# Patient Record
Sex: FEMALE | Race: WHITE | NOT HISPANIC OR LATINO | Employment: FULL TIME
[De-identification: names, ages, dates, MRNs, and addresses within clinical notes are randomized per-mention and may not be internally consistent; named-entity substitution may affect disease eponyms.]

---

## 2017-07-08 ENCOUNTER — HEALTH MAINTENANCE LETTER (OUTPATIENT)
Age: 47
End: 2017-07-08

## 2018-07-15 ENCOUNTER — HEALTH MAINTENANCE LETTER (OUTPATIENT)
Age: 48
End: 2018-07-15

## 2019-11-02 ENCOUNTER — HEALTH MAINTENANCE LETTER (OUTPATIENT)
Age: 49
End: 2019-11-02

## 2020-03-13 ENCOUNTER — MEDICAL CORRESPONDENCE (OUTPATIENT)
Dept: HEALTH INFORMATION MANAGEMENT | Facility: CLINIC | Age: 50
End: 2020-03-13

## 2020-03-13 DIAGNOSIS — R50.9 HYPERTHERMIA-INDUCED DEFECT: Primary | ICD-10-CM

## 2020-03-13 PROCEDURE — 36415 COLL VENOUS BLD VENIPUNCTURE: CPT | Performed by: PHYSICIAN ASSISTANT

## 2020-03-13 PROCEDURE — 85025 COMPLETE CBC W/AUTO DIFF WBC: CPT | Performed by: PHYSICIAN ASSISTANT

## 2020-03-18 LAB
BASOPHILS # BLD AUTO: 0 10E9/L (ref 0–0.2)
BASOPHILS NFR BLD AUTO: 0.3 %
DIFFERENTIAL METHOD BLD: NORMAL
EOSINOPHIL # BLD AUTO: 0.3 10E9/L (ref 0–0.7)
EOSINOPHIL NFR BLD AUTO: 4.8 %
ERYTHROCYTE [DISTWIDTH] IN BLOOD BY AUTOMATED COUNT: 12.4 % (ref 10–15)
HCT VFR BLD AUTO: 39.2 % (ref 35–47)
HGB BLD-MCNC: 12.6 G/DL (ref 11.7–15.7)
LYMPHOCYTES # BLD AUTO: 1.1 10E9/L (ref 0.8–5.3)
LYMPHOCYTES NFR BLD AUTO: 18.8 %
MCH RBC QN AUTO: 28.4 PG (ref 26.5–33)
MCHC RBC AUTO-ENTMCNC: 32.1 G/DL (ref 31.5–36.5)
MCV RBC AUTO: 89 FL (ref 78–100)
MONOCYTES # BLD AUTO: 0.3 10E9/L (ref 0–1.3)
MONOCYTES NFR BLD AUTO: 4.8 %
NEUTROPHILS # BLD AUTO: 4.3 10E9/L (ref 1.6–8.3)
NEUTROPHILS NFR BLD AUTO: 71.3 %
PLATELET # BLD AUTO: 403 10E9/L (ref 150–450)
RBC # BLD AUTO: 4.43 10E12/L (ref 3.8–5.2)
WBC # BLD AUTO: 6.1 10E9/L (ref 4–11)

## 2020-04-08 ENCOUNTER — E-VISIT (OUTPATIENT)
Dept: FAMILY MEDICINE | Facility: CLINIC | Age: 50
End: 2020-04-08
Payer: COMMERCIAL

## 2020-04-08 DIAGNOSIS — Z53.9 ERRONEOUS ENCOUNTER--DISREGARD: Primary | ICD-10-CM

## 2020-04-08 ASSESSMENT — ANXIETY QUESTIONNAIRES
GAD7 TOTAL SCORE: 2
7. FEELING AFRAID AS IF SOMETHING AWFUL MIGHT HAPPEN: NOT AT ALL
7. FEELING AFRAID AS IF SOMETHING AWFUL MIGHT HAPPEN: NOT AT ALL
5. BEING SO RESTLESS THAT IT IS HARD TO SIT STILL: NOT AT ALL
GAD7 TOTAL SCORE: 2
GAD7 TOTAL SCORE: 2
3. WORRYING TOO MUCH ABOUT DIFFERENT THINGS: NOT AT ALL
6. BECOMING EASILY ANNOYED OR IRRITABLE: SEVERAL DAYS
1. FEELING NERVOUS, ANXIOUS, OR ON EDGE: SEVERAL DAYS
4. TROUBLE RELAXING: NOT AT ALL
2. NOT BEING ABLE TO STOP OR CONTROL WORRYING: NOT AT ALL

## 2020-04-08 ASSESSMENT — PATIENT HEALTH QUESTIONNAIRE - PHQ9
SUM OF ALL RESPONSES TO PHQ QUESTIONS 1-9: 4
SUM OF ALL RESPONSES TO PHQ QUESTIONS 1-9: 4
10. IF YOU CHECKED OFF ANY PROBLEMS, HOW DIFFICULT HAVE THESE PROBLEMS MADE IT FOR YOU TO DO YOUR WORK, TAKE CARE OF THINGS AT HOME, OR GET ALONG WITH OTHER PEOPLE: SOMEWHAT DIFFICULT

## 2020-04-09 ENCOUNTER — VIRTUAL VISIT (OUTPATIENT)
Dept: FAMILY MEDICINE | Facility: CLINIC | Age: 50
End: 2020-04-09
Payer: COMMERCIAL

## 2020-04-09 VITALS — HEIGHT: 70 IN | BODY MASS INDEX: 25.48 KG/M2 | WEIGHT: 178 LBS

## 2020-04-09 DIAGNOSIS — F41.1 ANXIETY STATE: ICD-10-CM

## 2020-04-09 DIAGNOSIS — F32.0 MILD MAJOR DEPRESSION (H): Primary | ICD-10-CM

## 2020-04-09 PROCEDURE — 96127 BRIEF EMOTIONAL/BEHAV ASSMT: CPT | Performed by: PHYSICIAN ASSISTANT

## 2020-04-09 PROCEDURE — 99214 OFFICE O/P EST MOD 30 MIN: CPT | Mod: 95 | Performed by: PHYSICIAN ASSISTANT

## 2020-04-09 RX ORDER — PAROXETINE 20 MG/1
20 TABLET, FILM COATED ORAL EVERY MORNING
Qty: 30 TABLET | Refills: 1 | Status: SHIPPED | OUTPATIENT
Start: 2020-04-09 | End: 2020-06-10

## 2020-04-09 RX ORDER — BUPROPION HYDROCHLORIDE 150 MG/1
150 TABLET ORAL EVERY MORNING
Qty: 30 TABLET | Refills: 1 | Status: SHIPPED | OUTPATIENT
Start: 2020-04-09 | End: 2020-06-10

## 2020-04-09 ASSESSMENT — ANXIETY QUESTIONNAIRES
2. NOT BEING ABLE TO STOP OR CONTROL WORRYING: NOT AT ALL
IF YOU CHECKED OFF ANY PROBLEMS ON THIS QUESTIONNAIRE, HOW DIFFICULT HAVE THESE PROBLEMS MADE IT FOR YOU TO DO YOUR WORK, TAKE CARE OF THINGS AT HOME, OR GET ALONG WITH OTHER PEOPLE: VERY DIFFICULT
5. BEING SO RESTLESS THAT IT IS HARD TO SIT STILL: SEVERAL DAYS
1. FEELING NERVOUS, ANXIOUS, OR ON EDGE: NOT AT ALL
7. FEELING AFRAID AS IF SOMETHING AWFUL MIGHT HAPPEN: NOT AT ALL
GAD7 TOTAL SCORE: 2
6. BECOMING EASILY ANNOYED OR IRRITABLE: NEARLY EVERY DAY
3. WORRYING TOO MUCH ABOUT DIFFERENT THINGS: NOT AT ALL
GAD7 TOTAL SCORE: 5

## 2020-04-09 ASSESSMENT — PATIENT HEALTH QUESTIONNAIRE - PHQ9
SUM OF ALL RESPONSES TO PHQ QUESTIONS 1-9: 4
5. POOR APPETITE OR OVEREATING: SEVERAL DAYS
SUM OF ALL RESPONSES TO PHQ QUESTIONS 1-9: 8

## 2020-04-09 ASSESSMENT — MIFFLIN-ST. JEOR: SCORE: 1509.71

## 2020-04-09 NOTE — PROGRESS NOTES
"Carole Stark is a 49 year old female who is being evaluated via a billable telephone visit.      The patient has been notified of following:     \"This telephone visit will be conducted via a call between you and your physician/provider. We have found that certain health care needs can be provided without the need for a physical exam.  This service lets us provide the care you need with a short phone conversation.  If a prescription is necessary we can send it directly to your pharmacy.  If lab work is needed we can place an order for that and you can then stop by our lab to have the test done at a later time.    Telephone visits are billed at different rates depending on your insurance coverage. During this emergency period, for some insurers they may be billed the same as an in-person visit.  Please reach out to your insurance provider with any questions.    If during the course of the call the physician/provider feels a telephone visit is not appropriate, you will not be charged for this service.\"    Patient has given verbal consent for Telephone visit?  Yes    How would you like to obtain your AVS? Junior    Hailey Stark complains of   Chief Complaint   Patient presents with     Depression     Anxiety       ALLERGIES  Meloxicam    Depression and Anxiety Follow-Up    How are you doing with your depression since your last visit? Worsened     How are you doing with your anxiety since your last visit?  Worsened     Are you having other symptoms that might be associated with depression or anxiety? Yes:  sleeping a lot, irritable,     Have you had a significant life event? Grief or Loss lost her father last year      Do you have any concerns with your use of alcohol or other drugs? No     History of taking Wellbutrin, Paxil and Prozac in the past with no issues but likes the combo of Paxil and Wellbutrin especially.      Social History     Tobacco Use     Smoking status: Never Smoker     Smokeless " tobacco: Never Used   Substance Use Topics     Alcohol use: Yes     Comment: 1-2 beers 1-2Xs/mo     Drug use: No     PHQ 3/24/2016 4/8/2020 4/9/2020   PHQ-9 Total Score 2 4 8   Q9: Thoughts of better off dead/self-harm past 2 weeks Not at all Not at all Not at all     TIBURCIO-7 SCORE 3/24/2016 4/8/2020 4/9/2020   Total Score - - -   Total Score - 2 (minimal anxiety) -   Total Score 0 2 5     Last PHQ-9 4/9/2020   1.  Little interest or pleasure in doing things 1   2.  Feeling down, depressed, or hopeless 0   3.  Trouble falling or staying asleep, or sleeping too much 3   4.  Feeling tired or having little energy 3   5.  Poor appetite or overeating 1   6.  Feeling bad about yourself 0   7.  Trouble concentrating 0   8.  Moving slowly or restless 0   Q9: Thoughts of better off dead/self-harm past 2 weeks 0   PHQ-9 Total Score 8   Difficulty at work, home, or with people Very difficult     TIBURCIO-7  4/9/2020   1. Feeling nervous, anxious, or on edge 0   2. Not being able to stop or control worrying 0   3. Worrying too much about different things 0   4. Trouble relaxing 1   5. Being so restless that it is hard to sit still 1   6. Becoming easily annoyed or irritable 3   7. Feeling afraid, as if something awful might happen 0   TIBURCIO-7 Total Score 5   If you checked any problems, how difficult have they made it for you to do your work, take care of things at home, or get along with other people? Very difficult     In the past two weeks have you had thoughts of suicide or self-harm?  No.    Do you have concerns about your personal safety or the safety of others?   No    Suicide Assessment Five-step Evaluation and Treatment (SAFE-T)          Patient Active Problem List   Diagnosis     Anxiety state     Mild major depression (H)     Myofascial pain     Congenital abnormality of pregnant uterus     CARDIOVASCULAR SCREENING; LDL GOAL LESS THAN 160     Cervicitis     Health Care Home     Kidney stone     Mucopurulent vaginitis     Need  for Tdap vaccination     Moderate dysplasia of cervix     Past Surgical History:   Procedure Laterality Date     Cervical Conization Loop Electrode Excision  1998    KENNY II  F/U Pap q 3 mo x 2 yrs were all NORMAL     KIDNEY SURGERY  summer 2012    7 kidney stone excision/stent placed     SURGICAL HISTORY OF -       lasik     SURGICAL HISTORY OF -       catheter ablation heart atrial fib tx       Social History     Tobacco Use     Smoking status: Never Smoker     Smokeless tobacco: Never Used   Substance Use Topics     Alcohol use: Yes     Comment: 1-2 beers 1-2Xs/mo     Family History   Problem Relation Age of Onset     Thyroid Disease Mother         removed     Cancer Paternal Grandmother         stomach cancer     Alzheimer Disease Paternal Grandmother      Depression Sister      Thyroid Disease Sister         on meds         Current Outpatient Medications   Medication Sig Dispense Refill     buPROPion (WELLBUTRIN XL) 150 MG 24 hr tablet Take 1 tablet (150 mg) by mouth every morning 30 tablet 1     PARoxetine (PAXIL) 20 MG tablet Take 1 tablet (20 mg) by mouth every morning 30 tablet 1     ibuprofen (ADVIL,MOTRIN) 800 MG tablet Take 1 tablet (800 mg) by mouth every 8 hours as needed for moderate pain 30 tablet 0     Allergies   Allergen Reactions     Meloxicam      Tongue swelling     Recent Labs   Lab Test 10/02/14  1633 12/19/13  1535 06/27/12  1507   ALT  --  36  --    CR 1.05* 0.94  --    GFRESTIMATED 57* 65  --    GFRESTBLACK 69 79  --    POTASSIUM 4.1 4.0  --    TSH  --  2.51 1.55      BP Readings from Last 3 Encounters:   03/24/16 119/84   10/02/14 116/72   09/26/14 98/68    Wt Readings from Last 3 Encounters:   04/09/20 80.7 kg (178 lb)   03/24/16 76.3 kg (168 lb 5 oz)   10/02/14 75.8 kg (167 lb)                    Reviewed and updated as needed this visit by Provider  Tobacco  Allergies  Meds  Problems  Med Hx  Surg Hx  Fam Hx         Review of Systems   ROS COMP: Constitutional, HEENT,  "cardiovascular, pulmonary, GI, , musculoskeletal, neuro, skin, endocrine and psych systems are negative, except as otherwise noted.       Objective   Reported vitals:  Ht 1.765 m (5' 9.5\")   Wt 80.7 kg (178 lb)   BMI 25.91 kg/m     healthy, alert and no distress  Psych: Alert and oriented times 3; coherent speech, normal   rate and volume, able to articulate logical thoughts, able   to abstract reason, no tangential thoughts, no hallucinations   or delusions  Her affect is bright.     Diagnostic Test Results:  Labs reviewed in Epic        Assessment/Plan:  1. Mild major depression (H)  Long standing, chronic, UNcontrolled-  Restart medicines, prescription sent to pharmacy; not ready for counseling options at this time.  - PARoxetine (PAXIL) 20 MG tablet; Take 1 tablet (20 mg) by mouth every morning  Dispense: 30 tablet; Refill: 1  - buPROPion (WELLBUTRIN XL) 150 MG 24 hr tablet; Take 1 tablet (150 mg) by mouth every morning  Dispense: 30 tablet; Refill: 1    2. Anxiety state  Long standing, chronic, UNcontrolled-  Restart medicines, prescription sent to pharmacy; not ready for counseling options at this time.  - PARoxetine (PAXIL) 20 MG tablet; Take 1 tablet (20 mg) by mouth every morning  Dispense: 30 tablet; Refill: 1  - buPROPion (WELLBUTRIN XL) 150 MG 24 hr tablet; Take 1 tablet (150 mg) by mouth every morning  Dispense: 30 tablet; Refill: 1    Return in about 4 weeks (around 5/7/2020) for Routine visit, or sooner with worsening symptoms.      Phone call duration:  11 minutes    Vandana Ross PA-C    "

## 2020-04-10 ASSESSMENT — ANXIETY QUESTIONNAIRES: GAD7 TOTAL SCORE: 5

## 2020-07-07 DIAGNOSIS — F32.0 MILD MAJOR DEPRESSION (H): ICD-10-CM

## 2020-07-07 DIAGNOSIS — F41.1 ANXIETY STATE: ICD-10-CM

## 2020-07-09 RX ORDER — PAROXETINE 20 MG/1
20 TABLET, FILM COATED ORAL EVERY MORNING
Qty: 30 TABLET | Refills: 0 | Status: SHIPPED | OUTPATIENT
Start: 2020-07-09 | End: 2020-07-10

## 2020-07-09 RX ORDER — BUPROPION HYDROCHLORIDE 150 MG/1
150 TABLET ORAL EVERY MORNING
Qty: 30 TABLET | Refills: 0 | Status: SHIPPED | OUTPATIENT
Start: 2020-07-09 | End: 2020-07-10

## 2020-07-10 ENCOUNTER — VIRTUAL VISIT (OUTPATIENT)
Dept: FAMILY MEDICINE | Facility: CLINIC | Age: 50
End: 2020-07-10
Payer: COMMERCIAL

## 2020-07-10 DIAGNOSIS — R53.83 FATIGUE, UNSPECIFIED TYPE: ICD-10-CM

## 2020-07-10 DIAGNOSIS — R63.5 WEIGHT GAIN: ICD-10-CM

## 2020-07-10 DIAGNOSIS — R40.0 DAYTIME SLEEPINESS: Primary | ICD-10-CM

## 2020-07-10 DIAGNOSIS — F32.0 MILD MAJOR DEPRESSION (H): ICD-10-CM

## 2020-07-10 DIAGNOSIS — F41.1 ANXIETY STATE: ICD-10-CM

## 2020-07-10 PROCEDURE — 99214 OFFICE O/P EST MOD 30 MIN: CPT | Mod: 95 | Performed by: PHYSICIAN ASSISTANT

## 2020-07-10 RX ORDER — BUPROPION HYDROCHLORIDE 150 MG/1
150 TABLET ORAL EVERY MORNING
Qty: 90 TABLET | Refills: 3 | Status: SHIPPED | OUTPATIENT
Start: 2020-07-10 | End: 2020-08-20

## 2020-07-10 RX ORDER — PAROXETINE 20 MG/1
20 TABLET, FILM COATED ORAL EVERY MORNING
Qty: 90 TABLET | Refills: 3 | Status: SHIPPED | OUTPATIENT
Start: 2020-07-10 | End: 2020-08-20

## 2020-07-10 NOTE — PROGRESS NOTES
"Hailey Stark is a 50 year old female who is being evaluated via a billable video visit.      The patient has been notified of following:     \"This video visit will be conducted via a call between you and your physician/provider. We have found that certain health care needs can be provided without the need for an in-person physical exam.  This service lets us provide the care you need with a video conversation.  If a prescription is necessary we can send it directly to your pharmacy.  If lab work is needed we can place an order for that and you can then stop by our lab to have the test done at a later time.    Video visits are billed at different rates depending on your insurance coverage.  Please reach out to your insurance provider with any questions.    If during the course of the call the physician/provider feels a video visit is not appropriate, you will not be charged for this service.\"    Patient has given verbal consent for Video visit? Yes  How would you like to obtain your AVS? Mail a copy  Patient would like the video invitation sent by: Text to cell phone: [phone]  Will anyone else be joining your video visit? No    Subjective     Hailey Stark is a 50 year old female who presents today via video visit for the following health issues:    HPI  Medication Followup of  PARoxetine (PAXIL) 20 MG tablet     Taking Medication as prescribed: yes    Side Effects:  None    Medication Helping Symptoms:  yes   Feels better since starting wellbutrin and paxil   Not villar and irritable   Still feeling tired    Works 6 AM to 2:30 PM, will nap after work until 6-7 PM then to goes to sleep again around 10:30.   Not feeling depressed, just tired all the time.   Wears a fitbit to bed, shows she is up multiple times but she is not aware of sleep disturbance   Has heard she snores at night from friends   Never had a sleep apnea workup     No personal history of thyroid dysfunction   Mom has h/o thyroidectomy   Sister has " h/o hashimoto thyroiditis   Hair is thinning  And sleeping a lot   Weight gain   Dry skin    Diet is OK   Vegetarian since age 16 no meat no fish or eggs or poultry   Wondering about diet pills      Video Start Time: 2:09 PM            Reviewed and updated as needed this visit by Provider         Review of Systems   Constitutional, HEENT, cardiovascular, pulmonary, gi and gu systems are negative, except as otherwise noted.      Objective             Physical Exam     GENERAL: Healthy, alert and no distress  EYES: Eyes grossly normal to inspection.  No discharge or erythema, or obvious scleral/conjunctival abnormalities.  RESP: No audible wheeze, cough, or visible cyanosis.  No visible retractions or increased work of breathing.    SKIN: Visible skin clear. No significant rash, abnormal pigmentation or lesions.  NEURO: Cranial nerves grossly intact.  Mentation and speech appropriate for age.  PSYCH: Mentation appears normal, affect normal/bright, judgement and insight intact, normal speech and appearance well-groomed.      Diagnostic Test Results:  Labs reviewed in Epic        Assessment & Plan     (R40.0) Daytime sleepiness  (primary encounter diagnosis)  Comment: Getting plenty of sleep and naps, still with excessive daytime tiredness. Has been told she snores. Will send for sleep evaluation. Future labs to rule out common causes of fatigue also placed.   Plan: SLEEP EVALUATION & MANAGEMENT REFERRAL - [facility]         [phone]  (Age 18 and up)            (F32.0) Mild major depression (H)  Comment: Paxil and Wellbutrin helping. Refills provided.   Plan: PARoxetine (PAXIL) 20 MG tablet, buPROPion         (WELLBUTRIN XL) 150 MG 24 hr tablet           (F41.1) Anxiety state  Comment: As above.   Plan: PARoxetine (PAXIL) 20 MG tablet, buPROPion         (WELLBUTRIN XL) 150 MG 24 hr tablet            (R53.83) Fatigue, unspecified type  Comment: Rule out anemia, thyroid  dysfunction, vitamin deficiencies, electrolyte abnormalities   Plan: CBC with platelets, Iron and iron binding         capacity, Ferritin, TSH with free T4 reflex,         Vitamin B12, Vitamin D Deficiency,         Comprehensive metabolic panel           (R63.5) Weight gain  Comment: Gaining weight, interested in medication management of weight   Plan: COMPREHENSIVE WEIGHT MANAGEMENT          There are no Patient Instructions on file for this visit.    Return for Lab Work, sleep evaluation, weight management evaluation .    Valeriy Ramirez PA-C  [facility]      Video-Visit Details    Type of service:  Video Visit    Video End Time: 2:40 PM     Originating Location (pt. Location): Home    Distant Location (provider location):  Home office     Platform used for Video Visit: [facility]    Return for Lab Work, sleep evaluation, weight management evaluation .       Valeriy Ramirez PA-C

## 2020-07-10 NOTE — TELEPHONE ENCOUNTER
Medication is being filled for 1 time refill only due to:  Patient needs to be seen because Depression recheck/preventative visit.

## 2020-08-05 ENCOUNTER — TRANSFERRED RECORDS (OUTPATIENT)
Dept: HEALTH INFORMATION MANAGEMENT | Facility: CLINIC | Age: 50
End: 2020-08-05

## 2020-08-07 DIAGNOSIS — R53.83 FATIGUE, UNSPECIFIED TYPE: ICD-10-CM

## 2020-08-07 LAB
ALBUMIN SERPL-MCNC: 3.5 G/DL (ref 3.4–5)
ALP SERPL-CCNC: 86 U/L (ref 40–150)
ALT SERPL W P-5'-P-CCNC: 21 U/L (ref 0–50)
ANION GAP SERPL CALCULATED.3IONS-SCNC: 6 MMOL/L (ref 3–14)
AST SERPL W P-5'-P-CCNC: 22 U/L (ref 0–45)
BILIRUB SERPL-MCNC: 0.4 MG/DL (ref 0.2–1.3)
BUN SERPL-MCNC: 9 MG/DL (ref 7–30)
CALCIUM SERPL-MCNC: 8.9 MG/DL (ref 8.5–10.1)
CHLORIDE SERPL-SCNC: 106 MMOL/L (ref 94–109)
CO2 SERPL-SCNC: 25 MMOL/L (ref 20–32)
CREAT SERPL-MCNC: 0.95 MG/DL (ref 0.52–1.04)
ERYTHROCYTE [DISTWIDTH] IN BLOOD BY AUTOMATED COUNT: 12.8 % (ref 10–15)
FERRITIN SERPL-MCNC: 15 NG/ML (ref 8–252)
GFR SERPL CREATININE-BSD FRML MDRD: 70 ML/MIN/{1.73_M2}
GLUCOSE SERPL-MCNC: 103 MG/DL (ref 70–99)
HCT VFR BLD AUTO: 41.1 % (ref 35–47)
HGB BLD-MCNC: 13.4 G/DL (ref 11.7–15.7)
IRON SATN MFR SERPL: 12 % (ref 15–46)
IRON SERPL-MCNC: 44 UG/DL (ref 35–180)
MCH RBC QN AUTO: 28.8 PG (ref 26.5–33)
MCHC RBC AUTO-ENTMCNC: 32.6 G/DL (ref 31.5–36.5)
MCV RBC AUTO: 88 FL (ref 78–100)
PLATELET # BLD AUTO: 300 10E9/L (ref 150–450)
POTASSIUM SERPL-SCNC: 4.3 MMOL/L (ref 3.4–5.3)
PROT SERPL-MCNC: 7.5 G/DL (ref 6.8–8.8)
RBC # BLD AUTO: 4.65 10E12/L (ref 3.8–5.2)
SODIUM SERPL-SCNC: 137 MMOL/L (ref 133–144)
TIBC SERPL-MCNC: 361 UG/DL (ref 240–430)
TSH SERPL DL<=0.005 MIU/L-ACNC: 3.24 MU/L (ref 0.4–4)
VIT B12 SERPL-MCNC: 343 PG/ML (ref 193–986)
WBC # BLD AUTO: 5 10E9/L (ref 4–11)

## 2020-08-07 PROCEDURE — 82607 VITAMIN B-12: CPT | Performed by: PHYSICIAN ASSISTANT

## 2020-08-07 PROCEDURE — 85027 COMPLETE CBC AUTOMATED: CPT | Performed by: PHYSICIAN ASSISTANT

## 2020-08-07 PROCEDURE — 83540 ASSAY OF IRON: CPT | Performed by: PHYSICIAN ASSISTANT

## 2020-08-07 PROCEDURE — 82306 VITAMIN D 25 HYDROXY: CPT | Performed by: PHYSICIAN ASSISTANT

## 2020-08-07 PROCEDURE — 80053 COMPREHEN METABOLIC PANEL: CPT | Performed by: PHYSICIAN ASSISTANT

## 2020-08-07 PROCEDURE — 83550 IRON BINDING TEST: CPT | Performed by: PHYSICIAN ASSISTANT

## 2020-08-07 PROCEDURE — 84443 ASSAY THYROID STIM HORMONE: CPT | Performed by: PHYSICIAN ASSISTANT

## 2020-08-07 PROCEDURE — 82728 ASSAY OF FERRITIN: CPT | Performed by: PHYSICIAN ASSISTANT

## 2020-08-07 PROCEDURE — 36415 COLL VENOUS BLD VENIPUNCTURE: CPT | Performed by: PHYSICIAN ASSISTANT

## 2020-08-07 NOTE — RESULT ENCOUNTER NOTE
Macie Ms. Stark,  Your results came back and are within acceptable limits. -Normal red blood cell (hgb) levels, normal white blood cell count and normal platelet levels.. If you have any further concerns please do not hesitate to contact us by message, phone or making an appointment.  Have a good day   Dr Jos ARCE in [facility] absence

## 2020-08-07 NOTE — RESULT ENCOUNTER NOTE
Macie AlbarranEnma Stark,  Your results came back and are within acceptable limits. -Ferritin (iron) level is normal.. If you have any further concerns please do not hesitate to contact us by message, phone or making an appointment.  Have a good day   Dr Jos ARCE

## 2020-08-07 NOTE — RESULT ENCOUNTER NOTE
Macie Ms. Stark,  Your results came back and are within acceptable limits. -Liver and gallbladder tests are normal (ALT,AST, Alk phos, bilirubin), kidney function is normal (Cr, GFR), sodium is normal, potassium is normal, calcium is normal, glucose is normal.  -TSH (thyroid stimulating hormone) level is normal which indicates normal thyroid function.  - Normal vit B 12 level  . If you have any further concerns please do not hesitate to contact us by message, phone or making an appointment.  Have a good day   Dr Jos ARCE

## 2020-08-10 LAB — DEPRECATED CALCIDIOL+CALCIFEROL SERPL-MC: 16 UG/L (ref 20–75)

## 2020-08-10 NOTE — RESULT ENCOUNTER NOTE
Macie Ms. Stark,  Your results came back showing  -Vitamin D level is low and oral supplementation should be started.  ADVISE: starting over the counter Vitamin D3  2000 IU - 3 tabs (6000 IU) daily for 6 weeks and then 2000 IU daily to maintain levels.  Then in 2 months, please schedule a lab only appointment to recheck your Vitamin D levels.. If you have any further concerns please do not hesitate to contact us by message, phone or making an appointment.  Have a good day   Dr Jos ARCE in your pcp absence today

## 2020-08-14 DIAGNOSIS — F32.0 MILD MAJOR DEPRESSION (H): ICD-10-CM

## 2020-08-14 DIAGNOSIS — F41.1 ANXIETY STATE: ICD-10-CM

## 2020-08-20 RX ORDER — PAROXETINE 20 MG/1
TABLET, FILM COATED ORAL
Qty: 30 TABLET | Refills: 0 | Status: SHIPPED | OUTPATIENT
Start: 2020-08-20 | End: 2021-09-23

## 2020-08-20 RX ORDER — BUPROPION HYDROCHLORIDE 150 MG/1
TABLET ORAL
Qty: 30 TABLET | Refills: 0 | Status: SHIPPED | OUTPATIENT
Start: 2020-08-20 | End: 2021-09-24

## 2020-09-04 ENCOUNTER — OFFICE VISIT (OUTPATIENT)
Dept: FAMILY MEDICINE | Facility: CLINIC | Age: 50
End: 2020-09-04
Payer: COMMERCIAL

## 2020-09-04 VITALS
BODY MASS INDEX: 25.7 KG/M2 | RESPIRATION RATE: 14 BRPM | HEART RATE: 71 BPM | HEIGHT: 70 IN | OXYGEN SATURATION: 100 % | WEIGHT: 179.5 LBS | SYSTOLIC BLOOD PRESSURE: 144 MMHG | DIASTOLIC BLOOD PRESSURE: 86 MMHG

## 2020-09-04 DIAGNOSIS — L21.9 SEBORRHEIC DERMATITIS: ICD-10-CM

## 2020-09-04 DIAGNOSIS — R21 RASH AND NONSPECIFIC SKIN ERUPTION: Primary | ICD-10-CM

## 2020-09-04 PROCEDURE — 99214 OFFICE O/P EST MOD 30 MIN: CPT | Performed by: FAMILY MEDICINE

## 2020-09-04 RX ORDER — KETOCONAZOLE 20 MG/ML
SHAMPOO TOPICAL DAILY PRN
Qty: 120 ML | Refills: 1 | Status: SHIPPED | OUTPATIENT
Start: 2020-09-04 | End: 2021-12-10

## 2020-09-04 RX ORDER — BETAMETHASONE VALERATE 0.1 %
LOTION (ML) TOPICAL 2 TIMES DAILY
Qty: 60 ML | Refills: 0 | Status: SHIPPED | OUTPATIENT
Start: 2020-09-04 | End: 2021-04-29

## 2020-09-04 ASSESSMENT — MIFFLIN-ST. JEOR: SCORE: 1519.46

## 2020-09-04 ASSESSMENT — PAIN SCALES - GENERAL: PAINLEVEL: NO PAIN (0)

## 2020-09-04 NOTE — PROGRESS NOTES
Subjective     Hailey Stark is a 50 year old female who presents to clinic today for the following health issues:    HPI       Patient in for scalp concerns, Has some patches of dry skin not sure if it is psoriasis. States has been there for 2 months and getting worse. No pain no redness   Slightly itchy but not a lot and mostly pastor - one at the base on the back of scalp and one more in the parietal are , no crusting no drainage , no redness , no pimples etc        Review of Systems   Constitutional, HEENT, cardiovascular, pulmonary, GI, , musculoskeletal, neuro, skin, endocrine and psych systems are negative, except as otherwise noted.      Objective    There were no vitals taken for this visit.  There is no height or weight on file to calculate BMI.  Physical Exam   GENERAL: healthy, alert and no distress  SCALP: overall dry skin of scalp and two areas ( one in the parietal surface and one on the pccipital area ) are very dry with some dandruff   EYES: Eyes grossly normal to inspection, PERRL and conjunctivae and sclerae normal  NECK: no adenopathy, no asymmetry, masses, or scars and thyroid normal to palpation  MS: no gross musculoskeletal defects noted, no edema    No results found for any visits on 09/04/20.        Assessment & Plan     Rash and nonspecific skin eruption  We discussed as below the possibility of having seborrheic dermatitis of the scalp , yordan ltry with the shampoo and if not better use the lotion twice a day on the two spots , up to one week , if not better would need to see derm and I have given her the referral .  - DERMATOLOGY ADULT REFERRAL; Future  - ketoconazole (NIZORAL) 2 % external shampoo; Apply topically daily as needed for itching or irritation (use for washing the hair twice a week)  - betamethasone valerate (VALISONE) 0.1 % external lotion; Apply topically 2 times daily Apply on affected areas twice a day for up to two weeks    Seborrheic dermatitis  As above , discussed the  "seborrheic dermatitis and origins, course of treatment etc   RTC if no improving or worsening.  Pt is aware  and comfortable with the current plan.         BMI:   Estimated body mass index is 25.76 kg/m  as calculated from the following:    Height as of this encounter: 1.778 m (5' 10\").    Weight as of this encounter: 81.4 kg (179 lb 8 oz).           F/u in two to three weeks .      Celina Noel MD  [facility]      "

## 2020-11-16 ENCOUNTER — HEALTH MAINTENANCE LETTER (OUTPATIENT)
Age: 50
End: 2020-11-16

## 2020-11-19 ENCOUNTER — ANCILLARY PROCEDURE (OUTPATIENT)
Dept: GENERAL RADIOLOGY | Facility: CLINIC | Age: 50
End: 2020-11-19
Attending: PREVENTIVE MEDICINE
Payer: OTHER MISCELLANEOUS

## 2020-11-19 ENCOUNTER — NURSE TRIAGE (OUTPATIENT)
Dept: NURSING | Facility: CLINIC | Age: 50
End: 2020-11-19

## 2020-11-19 ENCOUNTER — OFFICE VISIT (OUTPATIENT)
Dept: URGENT CARE | Facility: URGENT CARE | Age: 50
End: 2020-11-19
Payer: OTHER MISCELLANEOUS

## 2020-11-19 ENCOUNTER — VIRTUAL VISIT (OUTPATIENT)
Dept: URGENT CARE | Facility: CLINIC | Age: 50
End: 2020-11-19
Payer: OTHER MISCELLANEOUS

## 2020-11-19 VITALS
SYSTOLIC BLOOD PRESSURE: 124 MMHG | WEIGHT: 178 LBS | DIASTOLIC BLOOD PRESSURE: 84 MMHG | BODY MASS INDEX: 25.48 KG/M2 | OXYGEN SATURATION: 97 % | HEART RATE: 94 BPM | TEMPERATURE: 99.4 F | HEIGHT: 70 IN

## 2020-11-19 DIAGNOSIS — R07.9 CHEST PAIN, UNSPECIFIED TYPE: ICD-10-CM

## 2020-11-19 DIAGNOSIS — R06.02 SOB (SHORTNESS OF BREATH): Primary | ICD-10-CM

## 2020-11-19 DIAGNOSIS — U07.1 CLINICAL DIAGNOSIS OF COVID-19: ICD-10-CM

## 2020-11-19 DIAGNOSIS — R06.02 SOB (SHORTNESS OF BREATH): ICD-10-CM

## 2020-11-19 DIAGNOSIS — M79.10 MYALGIA: ICD-10-CM

## 2020-11-19 LAB
ALBUMIN SERPL-MCNC: 3.6 G/DL (ref 3.4–5)
ALP SERPL-CCNC: 85 U/L (ref 40–150)
ALT SERPL W P-5'-P-CCNC: 18 U/L (ref 0–50)
ANION GAP SERPL CALCULATED.3IONS-SCNC: 4 MMOL/L (ref 3–14)
AST SERPL W P-5'-P-CCNC: 16 U/L (ref 0–45)
BASOPHILS # BLD AUTO: 0 10E9/L (ref 0–0.2)
BASOPHILS NFR BLD AUTO: 0.7 %
BILIRUB SERPL-MCNC: 0.4 MG/DL (ref 0.2–1.3)
BUN SERPL-MCNC: 10 MG/DL (ref 7–30)
CALCIUM SERPL-MCNC: 9.2 MG/DL (ref 8.5–10.1)
CHLORIDE SERPL-SCNC: 105 MMOL/L (ref 94–109)
CK SERPL-CCNC: 60 U/L (ref 30–225)
CO2 SERPL-SCNC: 28 MMOL/L (ref 20–32)
CREAT SERPL-MCNC: 1.1 MG/DL (ref 0.52–1.04)
CRP SERPL-MCNC: <2.9 MG/L (ref 0–8)
D DIMER PPP FEU-MCNC: 0.3 UG/ML FEU (ref 0–0.5)
DIFFERENTIAL METHOD BLD: NORMAL
EOSINOPHIL # BLD AUTO: 0.1 10E9/L (ref 0–0.7)
EOSINOPHIL NFR BLD AUTO: 1 %
ERYTHROCYTE [DISTWIDTH] IN BLOOD BY AUTOMATED COUNT: 12.4 % (ref 10–15)
GFR SERPL CREATININE-BSD FRML MDRD: 59 ML/MIN/{1.73_M2}
GLUCOSE SERPL-MCNC: 135 MG/DL (ref 70–99)
HCT VFR BLD AUTO: 41.1 % (ref 35–47)
HGB BLD-MCNC: 13.3 G/DL (ref 11.7–15.7)
LYMPHOCYTES # BLD AUTO: 1.7 10E9/L (ref 0.8–5.3)
LYMPHOCYTES NFR BLD AUTO: 28.3 %
MCH RBC QN AUTO: 28.5 PG (ref 26.5–33)
MCHC RBC AUTO-ENTMCNC: 32.4 G/DL (ref 31.5–36.5)
MCV RBC AUTO: 88 FL (ref 78–100)
MONOCYTES # BLD AUTO: 0.3 10E9/L (ref 0–1.3)
MONOCYTES NFR BLD AUTO: 5.5 %
NEUTROPHILS # BLD AUTO: 4 10E9/L (ref 1.6–8.3)
NEUTROPHILS NFR BLD AUTO: 64.5 %
NT-PROBNP SERPL-MCNC: 97 PG/ML (ref 0–125)
PLATELET # BLD AUTO: 327 10E9/L (ref 150–450)
POTASSIUM SERPL-SCNC: 3.9 MMOL/L (ref 3.4–5.3)
PROT SERPL-MCNC: 7.4 G/DL (ref 6.8–8.8)
RBC # BLD AUTO: 4.67 10E12/L (ref 3.8–5.2)
SODIUM SERPL-SCNC: 137 MMOL/L (ref 133–144)
TROPONIN I SERPL-MCNC: <0.015 UG/L (ref 0–0.04)
WBC # BLD AUTO: 6.1 10E9/L (ref 4–11)

## 2020-11-19 PROCEDURE — 84484 ASSAY OF TROPONIN QUANT: CPT | Performed by: PREVENTIVE MEDICINE

## 2020-11-19 PROCEDURE — 83880 ASSAY OF NATRIURETIC PEPTIDE: CPT | Performed by: PREVENTIVE MEDICINE

## 2020-11-19 PROCEDURE — 85379 FIBRIN DEGRADATION QUANT: CPT | Performed by: PREVENTIVE MEDICINE

## 2020-11-19 PROCEDURE — 82550 ASSAY OF CK (CPK): CPT | Performed by: PREVENTIVE MEDICINE

## 2020-11-19 PROCEDURE — 85025 COMPLETE CBC W/AUTO DIFF WBC: CPT | Performed by: PREVENTIVE MEDICINE

## 2020-11-19 PROCEDURE — 71046 X-RAY EXAM CHEST 2 VIEWS: CPT | Performed by: RADIOLOGY

## 2020-11-19 PROCEDURE — 80053 COMPREHEN METABOLIC PANEL: CPT | Performed by: PREVENTIVE MEDICINE

## 2020-11-19 PROCEDURE — 99207 PR NO CHARGE LOS: CPT | Performed by: NURSE PRACTITIONER

## 2020-11-19 PROCEDURE — 99215 OFFICE O/P EST HI 40 MIN: CPT | Performed by: PREVENTIVE MEDICINE

## 2020-11-19 PROCEDURE — 86140 C-REACTIVE PROTEIN: CPT | Performed by: PREVENTIVE MEDICINE

## 2020-11-19 PROCEDURE — 93000 ELECTROCARDIOGRAM COMPLETE: CPT | Performed by: PREVENTIVE MEDICINE

## 2020-11-19 PROCEDURE — 36415 COLL VENOUS BLD VENIPUNCTURE: CPT | Performed by: PREVENTIVE MEDICINE

## 2020-11-19 ASSESSMENT — MIFFLIN-ST. JEOR: SCORE: 1512.65

## 2020-11-19 NOTE — TELEPHONE ENCOUNTER
"Last Covid test was Nov. 2nd. It was   POSITIVE.  Had no symptoms at the time.    Now having ; exhaustion, she is winded, chest pressure, sob,  No fever.Body aches, and chills are positive.  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  Patient advised to make MD appointment to discuss if she should be returning. To work. She states she feels guilty staying home if she has no   Upper Respiratory symptoms.  Yudy Horn RN on 11/19/2020 at 11:09 AM      COVID 19 Nurse Triage Plan/Patient Instructions    Please be aware that novel coronavirus (COVID-19) may be circulating in the community. If you develop symptoms such as fever, cough, or SOB or if you have concerns about the presence of another infection including coronavirus (COVID-19), please contact your health care provider or visit [email or web address].     Disposition/Instructions    Home care recommended. Follow home care protocol based instructions.  Virtual Visit with provider recommended. Reference Visit Selection Guide.  Additional COVID19 information to add for patients.   How can I protect others?  If you have symptoms (fever, cough, body aches or trouble breathing): Stay home and away from others (self-isolate) until:    At least 10 days have passed since your symptoms started, And     You ve had no fever--and no medicine that reduces fever--for 1 full day (24 hours), And      Your other symptoms have resolved (gotten better).     If you don t have symptoms, but a test showed that you have COVID-19 (you tested positive):    Stay home and away from others (self-isolate). Follow the tips under \"How do I self-isolate?\" below for 10 days (20 days if you have a weak immune system).    You don't need to be retested for COVID-19 before going back to school or work. As long as you're fever-free and feeling better, you can go back to school, work and other activities after waiting the 10 or 20 days.     How do I " self-isolate?    Stay in your own room, even for meals. Use your own bathroom if you can.     Stay away from others in your home. No hugging, kissing or shaking hands. No visitors.    Don t go to work, school or anywhere else.     Clean  high touch  surfaces often (doorknobs, counters, handles, etc.). Use a household cleaning spray or wipes. You ll find a full list on the EPA website:  www.epa.gov/pesticide-registration/list-n-disinfectants-use-against-sars-cov-2.    Cover your mouth and nose with a mask, tissue or washcloth to avoid spreading germs.    Wash your hands and face often. Use soap and water.    Caregivers in these groups are at risk for severe illness due to COVID-19:  o People 65 years and older  o People who live in a nursing home or long-term care facility  o People with chronic disease (lung, heart, cancer, diabetes, kidney, liver, immunologic)  o People who have a weakened immune system, including those who:  - Are in cancer treatment  - Take medicine that weakens the immune system, such as corticosteroids  - Had a bone marrow or organ transplant  - Have an immune deficiency  - Have poorly controlled HIV or AIDS  - Are obese (body mass index of 40 or higher)  - Smoke regularly    Caregivers should wear gloves while washing dishes, handling laundry and cleaning bedrooms and bathrooms.    Use caution when washing and drying laundry: Don t shake dirty laundry, and use the warmest water setting that you can.    For more tips, go to www.cdc.gov/coronavirus/2019-ncov/downloads/10Things.pdf.    How can I take care of myself?  1. Get lots of rest. Drink extra fluids (unless a doctor has told you not to).     2. Take Tylenol (acetaminophen) for fever or pain. If you have liver or kidney problems, ask your family doctor if it s okay to take Tylenol.     Adults can take either:     650 mg (two 325 mg pills) every 4 to 6 hours, or     1,000 mg (two 500 mg pills) every 8 hours as needed.     Note: Don t take  more than 3,000 mg in one day.   Acetaminophen is found in many medicines (both prescribed and over-the-counter medicines). Read all labels to be sure you don t take too much.     For children, check the Tylenol bottle for the right dose. The dose is based on the child s age or weight.    3. If you have other health problems (like cancer, heart failure, an organ transplant or severe kidney disease): Call your specialty clinic if you don t feel better in the next 2 days.    4. Know when to call 911: Emergency warning signs include:    Trouble breathing or shortness of breath    Pain or pressure in the chest that doesn t go away    Feeling confused like you haven t felt before, or not being able to wake up    Bluish-colored lips or face    What are the symptoms of COVID-19?     The most common symptoms are cough, fever and trouble breathing.     Less common symptoms include body aches, chills, diarrhea (loose, watery poops), fatigue (feeling very tired), headache, runny nose, sore throat and loss of smell.    COVID-19 can cause severe coughing (bronchitis) and lung infection (pneumonia).    How does it spread?     The virus may spread when a person coughs or sneezes into the air. The virus can travel about 6 feet this way, and it can live on surfaces.      Common  (household disinfectants) will kill the virus.    Who is at risk?  Anyone can catch COVID-19 if they re around someone who has the virus.    How can others protect themselves?     Stay away from people who have COVID-19 (or symptoms of COVID-19).    Wash hands often with soap and water. Or, use hand  with at least 60% alcohol.    Avoid touching the eyes, nose or mouth.     Wear a face mask when you go out in public, when sick or when caring for a sick person.    Where can I get more information?     [employer] [facility]: About COVID-19: [email or web address]    CDC: What to Do If You re Sick:  www.cdc.gov/coronavirus/2019-ncov/about/steps-when-sick.html    CDC: Ending Home Isolation: www.cdc.gov/coronavirus/2019-ncov/hcp/disposition-in-home-patients.html     CDC: Caring for Someone: www.cdc.gov/coronavirus/2019-ncov/if-you-are-sick/care-for-someone.html     [facility]: Interim Guidance for Hospital Discharge to Home: www.[facility]/diseases/coronavirus/hcp/hospdischarge.pdf    [facility] clinical trials (COVID-19 research studies): clinicalaffairs.[facility]-clinical-trials     Below are the COVID-19 hotlines at the [place] Department of [facility] ([facility]). Interpreters are available.   o For health questions: Call 009-990-7873 or 1-872.717.3130 (7 a.m. to 7 p.m.)  o For questions about schools and childcare: Call 849-745-3297 or 1-186.178.9877 (7 a.m. to 7 p.m.)          Thank you for taking steps to prevent the spread of this virus.  o Limit your contact with others.  o Wear a simple mask to cover your cough.  o Wash your hands well and often.    [facility]: About COVID-19: [email or web address]    CDC: What to Do If You're Sick: www.cdc.gov/coronavirus/2019-ncov/about/steps-when-sick.html    CDC: Ending Home Isolation: www.cdc.gov/coronavirus/2019-ncov/hcp/disposition-in-home-patients.html     CDC: Caring for Someone: www.cdc.gov/coronavirus/2019-ncov/if-you-are-sick/care-for-someone.html     [facility]: Interim Guidance for Hospital Discharge to Home: www.[facility]/diseases/coronavirus/hcp/hospdischarge.pdf    [facility] clinical trials (COVID-19 research studies): clinicalaffairs.[facility]-clinical-trials     Below are the COVID-19 hotlines at the [place] Department [facility] ([facility]). Interpreters are available.   o For health questions: Call 226-539-5804 or 1-642.672.8191 (7 a.m. to 7 p.m.)  o For questions about schools and childcare: Call 646-272-7180 or 1-483.573.3074 (7 a.m. to 7 p.m.)

## 2020-11-19 NOTE — PROGRESS NOTES
No smoking or vaping, recreational drug use    Wellbutrin, Paxil    SUBJECTIVE:  Hailey Stark is a 50 year old female who presents to the office with the CC of chest pain.  Patient complains of chest pressure/discomfort, dyspnea and fatigue.  The pain is characterized as mild pressure located in the central chest without radiation. Symptoms began 2 day(s) ago, gradual onset  Pain is associated with SOB.  Pain is exacerbated by nothing.  Pain is relieved by rest.  Cardiac risk factors: negative for abnormal lipids, DM, HTN, tobacco and negative FH    She was exposed to COVID at work. First became symptomatic on October 2 with headache and  Wheezing.  Diagnosed on 10/6 with COVID  Back to work on October 12.  Worked until October 16 when she developed chest pressure and sob.  Exposed at work - [employer] - back to work on 12-16  No personal or family hx of vte or premature cad          Past Medical History:   Diagnosis Date     Anxiety state, unspecified     Anxiety     Cardiac dysrhythmia, unspecified     Arrhythmia NOS s/p ablation     Chronic peptic ulcer, unspecified site, without mention of hemorrhage, perforation, or obstruction     Ulcer, Peptic     Depressive disorder, not elsewhere classified     Depression (non-psychotic)     Leukorrhea, not specified as infective 11/18/2009    heavy, daily, yellow/green mucoid dischg following retained tampon removal.Tx w flagyl, metrogel, Cleocin, Diflucan, doxycycline, boric acid capsules. 5/16/2011 + GBS.      Menarche age 13    cycles q 30 x 5 d, heavy     Moderate dysplasia of cervix (KENNY II) 1998     Normal Papssince LEEP->routine screening         Current Outpatient Medications:      buPROPion (WELLBUTRIN XL) 150 MG 24 hr tablet, TAKE 1 TABLET(150 MG) BY MOUTH EVERY MORNING, Disp: 30 tablet, Rfl: 0     PARoxetine (PAXIL) 20 MG tablet, TAKE 1 TABLET(20 MG) BY MOUTH EVERY MORNING, Disp: 30 tablet, Rfl: 0     betamethasone valerate (VALISONE) 0.1 %  "external lotion, Apply topically 2 times daily Apply on affected areas twice a day for up to two weeks (Patient not taking: Reported on 11/19/2020), Disp: 60 mL, Rfl: 0     ibuprofen (ADVIL,MOTRIN) 800 MG tablet, Take 1 tablet (800 mg) by mouth every 8 hours as needed for moderate pain (Patient not taking: Reported on 7/10/2020), Disp: 30 tablet, Rfl: 0     ketoconazole (NIZORAL) 2 % external shampoo, Apply topically daily as needed for itching or irritation (use for washing the hair twice a week) (Patient not taking: Reported on 11/19/2020), Disp: 120 mL, Rfl: 1    Social History     Tobacco Use     Smoking status: Never Smoker     Smokeless tobacco: Never Used   Substance Use Topics     Alcohol use: Not Currently     Comment: 1-2 beers 1-2Xs/mo     Family History   Problem Relation Age of Onset     Thyroid Disease Mother         removed     Cancer Paternal Grandmother         stomach cancer     Alzheimer Disease Paternal Grandmother      Depression Sister      Thyroid Disease Sister      Thyroid Disease Sister         on meds     Diabetes Brother      Depression Brother      Diabetes Nephew        ROS:Review of systems negative except as stated above.    EXAM:  /84   Pulse 94   Temp 99.4  F (37.4  C) (Tympanic)   Ht 1.778 m (5' 10\")   Wt 80.7 kg (178 lb)   SpO2 97%   BMI 25.54 kg/m    GENERAL APPEARANCE: healthy, alert and no distress  EYES: EOMI,  PERRL, conjunctiva clear  HENT: ear canals and TM's normal.  Nose and mouth without ulcers, erythema or lesions  NECK: supple, nontender, no lymphadenopathy  RESP: lungs clear to auscultation - no rales, rhonchi or wheezes  CV: regular rates and rhythm, normal S1 S2, no murmur noted, no ttp over chest, no rash  ABDOMEN:  soft, nontender, no HSM or masses and bowel sounds normal  NEURO: Normal strength and tone, sensory exam grossly normal,  normal speech and mentation  SKIN: no suspicious lesions or rashes     EKG - nsr, no st or t wave changes, no q waves, " nl intervals, no comparisons available  CXR - no infiltrate, no edema, no effusion, normal cardiac silhouette  Labs - cbc,ddimer, troponin, cmp, bnp all wnl        Assessment  / IMPRESSION  (R06.02) SOB (shortness of breath)  (primary encounter diagnosis)  Plan: EKG 12-lead complete w/read - Clinics, XR Chest        2 Views, CBC with platelets and differential,         Troponin I, BNP-N terminal pro, Comprehensive         metabolic panel, D dimer, quantitative  Advise oximeter at home, follow up if o2 < 90 or sob worsens    (R07.9) Chest pain, unspecified type  Plan: CBC with platelets and differential, Troponin         I, BNP-N terminal pro, Comprehensive metabolic         panel, D dimer, quantitative  Follow up if chest pain worsens.     (M79.10) Myalgia  Plan: CK total, CRP, inflammation    I believe all symptoms are consistent with covid  Don't return to work until symptoms improve and you are fever free for 24 hours.  Tylenol for achiness.  Rest.     Follow up in one week by virtual visit if not improving or sooner if worsening.    PLAN:See orders in epic    40 minutes spent face to face with patient, over 50% time counseling, coordinating care and explaining about nature of the patient's conditions.

## 2020-11-19 NOTE — PROGRESS NOTES
"SUBJECTIVE:   Hailey Stark is a 50 year old female presenting with a chief complaint of fatigue.   Tested positive for covid 11/6. Went back to work 11/12. Was feeling better  Starting to feel ill again 6 days ago.   Fatigue, weakness, aches, sob with exertion, feels slightly \"winded\"   Drinking and eating normally no vomiting diarrhea. No fever    Past Medical History:   Diagnosis Date     Anxiety state, unspecified     Anxiety     Cardiac dysrhythmia, unspecified     Arrhythmia NOS s/p ablation     Chronic peptic ulcer, unspecified site, without mention of hemorrhage, perforation, or obstruction     Ulcer, Peptic     Depressive disorder, not elsewhere classified     Depression (non-psychotic)     Leukorrhea, not specified as infective 11/18/2009    heavy, daily, yellow/green mucoid dischg following retained tampon removal.Tx w flagyl, metrogel, Cleocin, Diflucan, doxycycline, boric acid capsules. 5/16/2011 + GBS.      Menarche age 13    cycles q 30 x 5 d, heavy     Moderate dysplasia of cervix (KENNY II) 1998     Normal Papssince LEEP->routine screening     Current Outpatient Medications   Medication Sig Dispense Refill     betamethasone valerate (VALISONE) 0.1 % external lotion Apply topically 2 times daily Apply on affected areas twice a day for up to two weeks 60 mL 0     buPROPion (WELLBUTRIN XL) 150 MG 24 hr tablet TAKE 1 TABLET(150 MG) BY MOUTH EVERY MORNING 30 tablet 0     ibuprofen (ADVIL,MOTRIN) 800 MG tablet Take 1 tablet (800 mg) by mouth every 8 hours as needed for moderate pain (Patient not taking: Reported on 7/10/2020) 30 tablet 0     ketoconazole (NIZORAL) 2 % external shampoo Apply topically daily as needed for itching or irritation (use for washing the hair twice a week) 120 mL 1     PARoxetine (PAXIL) 20 MG tablet TAKE 1 TABLET(20 MG) BY MOUTH EVERY MORNING 30 tablet 0     Social History     Tobacco Use     Smoking status: Never Smoker     Smokeless tobacco: Never Used   Substance Use Topics     " Alcohol use: Not Currently     Comment: 1-2 beers 1-2Xs/mo       ROS:  CONSTITUTIONAL:POSITIVE  for fatigue  INTEGUMENTARY/SKIN: NEGATIVE for worrisome rashes, moles or lesions  EYES: NEGATIVE for vision changes or irritation  ENT/MOUTH: NEGATIVE for ear, mouth and throat problems  RESP:POSITIVE for SOB    OBJECTIVE:  GENERAL APPEARANCE: alert and no distress  RESP: lungs clear, no wheezes  CV: regular rates and rhythm  SKIN: no suspicious lesions or rashes    ASSESSMENT:  (R06.02) SOB (shortness of breath)  (primary encounter diagnosis)    (U07.1) Clinical diagnosis of COVID-19      PLAN:  In our telephone visit she does not sob, she is speaking in full sentences. She does not sound urgently ill on phone.   She is afebrile and hydrated. I am concerned about possible secondary complication to covid such as pneumonia with feeling better now worse and new symptoms?  Would like her seen in urgent care today for vitals, lung assessment xray etc  Did discuss if her oxygen were low or were deemed she was too ill on exam may need to be seen in ER. She will drive herself to urgent care now    Telephone time spent 15 minutes    MELODIE Foster CNP

## 2020-11-19 NOTE — PATIENT INSTRUCTIONS
(R06.02) SOB (shortness of breath)  (primary encounter diagnosis)  Plan: EKG 12-lead complete w/read - Clinics, XR Chest        2 Views, CBC with platelets and differential,         Troponin I, BNP-N terminal pro, Comprehensive         metabolic panel, D dimer, quantitative  Advise oximeter at home, follow up if o2 < 90 or sob worsens    (R07.9) Chest pain, unspecified type  Plan: CBC with platelets and differential, Troponin         I, BNP-N terminal pro, Comprehensive metabolic         panel, D dimer, quantitative  Follow up if chest pain worsens.     (M79.10) Myalgia  Plan: CK total, CRP, inflammation    I believe all symptoms are consistent with covid  Don't return to work until symptoms improve and you are fever free for 24 hours.  Tylenol for achiness.  Rest    Follow up in one week by virtual visit if not improving or sooner if worsening.

## 2020-11-20 ENCOUNTER — MYC MEDICAL ADVICE (OUTPATIENT)
Dept: FAMILY MEDICINE | Facility: CLINIC | Age: 50
End: 2020-11-20

## 2021-01-26 ENCOUNTER — MYC MEDICAL ADVICE (OUTPATIENT)
Dept: FAMILY MEDICINE | Facility: CLINIC | Age: 51
End: 2021-01-26

## 2021-01-26 DIAGNOSIS — U07.1 INFECTION DUE TO 2019 NOVEL CORONAVIRUS: Primary | ICD-10-CM

## 2021-01-26 DIAGNOSIS — R06.02 SHORTNESS OF BREATH: ICD-10-CM

## 2021-01-26 DIAGNOSIS — R53.83 FATIGUE, UNSPECIFIED TYPE: ICD-10-CM

## 2021-01-28 NOTE — TELEPHONE ENCOUNTER
I put in a referral to have her be seen by the post-COVID adult care clinic. They are doing a lot with patients having ongoing symptoms after COVID infection. They should be reaching out to her to schedule an appointment.

## 2021-01-28 NOTE — TELEPHONE ENCOUNTER
Writer responded via [employer].  JOSUE TranN, RN  [facility]

## 2021-01-28 NOTE — TELEPHONE ENCOUNTER
Valeriy,    Please see her complaints. Do you want to use an inperson appt tomorrow for a virtual visit for her or do you want to see her in person in one of those slots?    Radha Eastman, RN, BSN

## 2021-01-29 ENCOUNTER — MYC MEDICAL ADVICE (OUTPATIENT)
Dept: FAMILY MEDICINE | Facility: CLINIC | Age: 51
End: 2021-01-29

## 2021-02-06 ENCOUNTER — HEALTH MAINTENANCE LETTER (OUTPATIENT)
Age: 51
End: 2021-02-06

## 2021-02-10 ENCOUNTER — DOCUMENTATION ONLY (OUTPATIENT)
Dept: CARE COORDINATION | Facility: CLINIC | Age: 51
End: 2021-02-10

## 2021-03-10 ENCOUNTER — VIRTUAL VISIT (OUTPATIENT)
Dept: PHYSICAL MEDICINE AND REHAB | Facility: CLINIC | Age: 51
End: 2021-03-10
Attending: PHYSICIAN ASSISTANT
Payer: OTHER MISCELLANEOUS

## 2021-03-10 DIAGNOSIS — R00.2 PALPITATIONS: ICD-10-CM

## 2021-03-10 DIAGNOSIS — R41.89 COGNITIVE CHANGES: ICD-10-CM

## 2021-03-10 DIAGNOSIS — G93.31 POSTVIRAL FATIGUE SYNDROME: ICD-10-CM

## 2021-03-10 DIAGNOSIS — R06.02 SHORTNESS OF BREATH: ICD-10-CM

## 2021-03-10 DIAGNOSIS — U07.1 INFECTION DUE TO 2019 NOVEL CORONAVIRUS: ICD-10-CM

## 2021-03-10 DIAGNOSIS — R53.83 FATIGUE, UNSPECIFIED TYPE: ICD-10-CM

## 2021-03-10 DIAGNOSIS — R06.09 DYSPNEA ON EXERTION: Primary | ICD-10-CM

## 2021-03-10 PROCEDURE — 99215 OFFICE O/P EST HI 40 MIN: CPT | Mod: 95 | Performed by: INTERNAL MEDICINE

## 2021-03-10 ASSESSMENT — ANXIETY QUESTIONNAIRES
7. FEELING AFRAID AS IF SOMETHING AWFUL MIGHT HAPPEN: NOT AT ALL
2. NOT BEING ABLE TO STOP OR CONTROL WORRYING: SEVERAL DAYS
1. FEELING NERVOUS, ANXIOUS, OR ON EDGE: SEVERAL DAYS
5. BEING SO RESTLESS THAT IT IS HARD TO SIT STILL: NOT AT ALL
IF YOU CHECKED OFF ANY PROBLEMS ON THIS QUESTIONNAIRE, HOW DIFFICULT HAVE THESE PROBLEMS MADE IT FOR YOU TO DO YOUR WORK, TAKE CARE OF THINGS AT HOME, OR GET ALONG WITH OTHER PEOPLE: NOT DIFFICULT AT ALL
6. BECOMING EASILY ANNOYED OR IRRITABLE: NOT AT ALL
GAD7 TOTAL SCORE: 4
3. WORRYING TOO MUCH ABOUT DIFFERENT THINGS: SEVERAL DAYS

## 2021-03-10 ASSESSMENT — PATIENT HEALTH QUESTIONNAIRE - PHQ9
5. POOR APPETITE OR OVEREATING: SEVERAL DAYS
SUM OF ALL RESPONSES TO PHQ QUESTIONS 1-9: 9

## 2021-03-10 NOTE — LETTER
3/10/2021       RE: Hailey Stark  [address]  [place] [facility] [address]     Dear Colleague,    Thank you for referring your patient, Hailey Stark, to the [facility] PHYSICAL MEDICINE AND REHABILITATION CLINIC [place] [facility]. Please see a copy of my visit note below.    Hailey is a 50 year old who is being evaluated via a billable video visit.      How would you like to obtain your AVS? MyChart  If the video visit is dropped, the invitation should be resent by: Text to cell phone: [phone]  Will anyone else be joining your video visit? No      Video Start Time: 4:02 PM    Assessment & Plan     Infection due to 2019 novel coronavirus  Patient was advised on sequelae of COVID-19 infection.    - ADULT POST COVID CLINIC REFERRAL    Fatigue, unspecified type  Discussed possible causes of fatigue following a viral illness.  Recommend further evaluation.  Referral to sleep management as well as physical therapy program were provided to the patient today.  - ADULT POST COVID CLINIC REFERRAL  - TSH with free T4 reflex; Future  - Comprehensive metabolic panel; Future  - CBC with Platelets Differential; Future  - C-Reactive Protein, Inflammatory; Future  - RBC Sedimentation Rate; Future  - Hemoglobin A1c; Future  - SLEEP EVALUATION & MANAGEMENT REFERRAL - [facility] [place] [phone] (Age 15 and up); Future  - PHYSICAL THERAPY REFERRAL; Future    Shortness of breath  Reviewed shortness of breath following COVID-19 infection with patient.  Recommend further evaluation with pulmonary function tests as well as a consultation with the pulmonary specialist.  - ADULT POST COVID CLINIC REFERRAL    Dyspnea on exertion    - General PFT Lab (Please always keep checked); Future  - PULMONARY MEDICINE REFERRAL  - 6 minute walk test; Future  - Pulmonary Function Test; Future    Palpitations  Patient was advised on possible cardiac involvement with  "COVID-19.  Recommend EKG monitoring as well as echocardiogram.  - Leadless EKG Monitor 3 to 7 Days; Future  - Echocardiogram Complete; Future    Postviral fatigue syndrome  Referral to Occupational Therapy COVID-19 fatigue reduction program was placed today.  - OCCUPATIONAL THERAPY REFERRAL; Future    Cognitive changes  Discussed possible causes of cognitive changes, including anxiety, depression, and COVID-19 sequelae.  Recommend neuropsychology testing.  Referral to speech therapy for cognitive rehab was also placed today.  - NEUROPSYCHOLOGY REFERRAL  - SPEECH THERAPY REFERRAL; Future      45 minutes spent on the date of the encounter doing chart review, history and exam, documentation and further activities as noted above       BMI:   Estimated body mass index is 25.54 kg/m  as calculated from the following:    Height as of 11/19/20: 1.778 m (5' 10\").    Weight as of 11/19/20: 80.7 kg (178 lb).     Return in about 1 month (around 4/10/2021) for Routine Visit, BP Recheck.    Fatoumata Rahman MD  [facility] PHYSICAL MEDICINE AND REHABILITATION CLINIC [place]      Carole Hart is a 50 year old who presents for the following health issues     HPI     Patient has scheduled a visit to discuss some of the symptoms related to COVID-19 infection. She reports that she had COVID in November. However, she is still feeling short of breath with physical activity. 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 She tested positive on November 6th. She states that she went back to work after a week and was feeling very tired. She came back to work again after completing her quarantine. She was seen in urgent care again on November 19. Patient reports that she has history of exercise induces asthma.   Patient reports that she also has been feeling very tired since she had COVID. She is working full shifts. She comes home feeling very tired and may fall asleep until the next morning. She " states she does not wake up feeling rested. She has been told that she snores.    Review of Systems   Constitutional, HEENT, cardiovascular, pulmonary, GI, , musculoskeletal, neuro, skin, endocrine and psych systems are negative, except as otherwise noted.      Objective      Vitals:  No vitals were obtained today due to virtual visit.    Physical Exam   GENERAL: Healthy, alert and no distress  EYES: Eyes grossly normal to inspection.  No discharge or erythema, or obvious scleral/conjunctival abnormalities.  RESP: No audible wheeze, cough, or visible cyanosis.  No visible retractions or increased work of breathing.    SKIN: Visible skin clear. No significant rash, abnormal pigmentation or lesions.  NEURO: Cranial nerves grossly intact.  Mentation and speech appropriate for age.  PSYCH: Mentation appears normal, affect normal/bright, judgement and insight intact, normal speech and appearance well-groomed.    Video-Visit Details    Type of service:  Video Visit    Video End Time:4: 40 PM    Originating Location (pt. Location): Home    Distant Location (provider location):  [facility] PHYSICAL MEDICINE AND REHABILITATION CLINIC [place]     Platform used for Video Visit: AmWell    Again, thank you for allowing me to participate in the care of your patient.      Sincerely,    Fatoumata Rahman MD

## 2021-03-10 NOTE — PROGRESS NOTES
Hailey is a 50 year old who is being evaluated via a billable video visit.      How would you like to obtain your AVS? MyChart  If the video visit is dropped, the invitation should be resent by: Text to cell phone: [phone]  Will anyone else be joining your video visit? No      Video Start Time: 4:02 PM    Assessment & Plan     Infection due to 2019 novel coronavirus  Patient was advised on sequelae of COVID-19 infection.    - ADULT POST COVID CLINIC REFERRAL    Fatigue, unspecified type  Discussed possible causes of fatigue following a viral illness.  Recommend further evaluation.  Referral to sleep management as well as physical therapy program were provided to the patient today.  - ADULT POST COVID CLINIC REFERRAL  - TSH with free T4 reflex; Future  - Comprehensive metabolic panel; Future  - CBC with Platelets Differential; Future  - C-Reactive Protein, Inflammatory; Future  - RBC Sedimentation Rate; Future  - Hemoglobin A1c; Future  - SLEEP EVALUATION & MANAGEMENT REFERRAL - ADULT [facility] [phone] (Age 15 and up); Future  - PHYSICAL THERAPY REFERRAL; Future    Shortness of breath  Reviewed shortness of breath following COVID-19 infection with patient.  Recommend further evaluation with pulmonary function tests as well as a consultation with the pulmonary specialist.  - ADULT POST COVID CLINIC REFERRAL    Dyspnea on exertion    - General PFT Lab (Please always keep checked); Future  - PULMONARY MEDICINE REFERRAL  - 6 minute walk test; Future  - Pulmonary Function Test; Future    Palpitations  Patient was advised on possible cardiac involvement with COVID-19.  Recommend EKG monitoring as well as echocardiogram.  - Leadless EKG Monitor 3 to 7 Days; Future  - Echocardiogram Complete; Future    Postviral fatigue syndrome  Referral to Occupational Therapy COVID-19 fatigue reduction program was placed today.  - OCCUPATIONAL THERAPY REFERRAL; Future    Cognitive changes  Discussed  "possible causes of cognitive changes, including anxiety, depression, and COVID-19 sequelae.  Recommend neuropsychology testing.  Referral to speech therapy for cognitive rehab was also placed today.  - NEUROPSYCHOLOGY REFERRAL  - SPEECH THERAPY REFERRAL; Future      45 minutes spent on the date of the encounter doing chart review, history and exam, documentation and further activities as noted above       BMI:   Estimated body mass index is 25.54 kg/m  as calculated from the following:    Height as of 11/19/20: 1.778 m (5' 10\").    Weight as of 11/19/20: 80.7 kg (178 lb).           Return in about 1 month (around 4/10/2021) for Routine Visit, BP Recheck.    Fatoumata Rahman MD  [facility] PHYSICAL MEDICINE AND REHABILITATION CLINIC [place] is a 50 year old who presents for the following health issues     HPI       Patient has scheduled a visit to discuss some of the symptoms related to COVID-19 infection. She reports that she had COVID in November. However, she is still feeling short of breath with physical activity. 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 She tested positive on November 6th. She states that she went back to work after a week and was feeling very tired. She came back to work again after completing her quarantine. She was seen in urgent care again on November 19. Patient reports that she has history of exercise induces asthma.   Patient reports that she also has been feeling very tired since she had COVID. She is working full shifts. She comes home feeling very tired and may fall asleep until the next morning. She states she does not wake up feeling rested. She has been told that she snores.    Review of Systems   Constitutional, HEENT, cardiovascular, pulmonary, GI, , musculoskeletal, neuro, skin, endocrine and psych systems are negative, except as otherwise noted.      Objective           Vitals:  No vitals were obtained today due to " virtual visit.    Physical Exam   GENERAL: Healthy, alert and no distress  EYES: Eyes grossly normal to inspection.  No discharge or erythema, or obvious scleral/conjunctival abnormalities.  RESP: No audible wheeze, cough, or visible cyanosis.  No visible retractions or increased work of breathing.    SKIN: Visible skin clear. No significant rash, abnormal pigmentation or lesions.  NEURO: Cranial nerves grossly intact.  Mentation and speech appropriate for age.  PSYCH: Mentation appears normal, affect normal/bright, judgement and insight intact, normal speech and appearance well-groomed.                Video-Visit Details    Type of service:  Video Visit    Video End Time:4: 40 PM    Originating Location (pt. Location): Home    Distant Location (provider location):  [facility] PHYSICAL MEDICINE AND REHABILITATION CLINIC [place]     Platform used for Video Visit: [employer]

## 2021-03-11 ASSESSMENT — ANXIETY QUESTIONNAIRES: GAD7 TOTAL SCORE: 4

## 2021-03-15 ENCOUNTER — TELEPHONE (OUTPATIENT)
Dept: PULMONOLOGY | Facility: CLINIC | Age: 51
End: 2021-03-15

## 2021-03-15 DIAGNOSIS — R06.00 DYSPNEA: ICD-10-CM

## 2021-03-15 DIAGNOSIS — U07.1 2019 NOVEL CORONAVIRUS DISEASE (COVID-19): Primary | ICD-10-CM

## 2021-03-15 NOTE — TELEPHONE ENCOUNTER
Patient returned my voicemails and was able to schedule her referral that was placed by Dr. Rahman. Discussed limited availability the remainder of March and most of April, thus would be looking at the end of April with Dr. Keller, She is agreeable to this. No testing in the last three months thus this was scheduled as well. Details confirmed with patient.

## 2021-03-16 NOTE — TELEPHONE ENCOUNTER
RECORDS RECEIVED FROM: internal    DATE RECEIVED: 4.29.21    NOTES STATUS DETAILS   OFFICE NOTE from referring provider Fatoumata Bhakta,    OFFICE NOTE from other specialist na    DISCHARGE SUMMARY from hospital na    DISCHARGE REPORT from the ER internal  [facility] 11.19.20 [facility]    MEDICATION LIST internal     IMAGING  (NEED IMAGES AND REPORTS)     CT SCAN na    CHEST XRAY (CXR) internal  Scheduled 4.29.21    TESTS     PULMONARY FUNCTION TESTING (PFT) internal  Scheduled 4.29.21

## 2021-03-19 DIAGNOSIS — U07.1 2019 NOVEL CORONAVIRUS DISEASE (COVID-19): ICD-10-CM

## 2021-03-19 DIAGNOSIS — R53.83 FATIGUE, UNSPECIFIED TYPE: ICD-10-CM

## 2021-03-19 DIAGNOSIS — R06.00 DYSPNEA: ICD-10-CM

## 2021-03-19 LAB
ALBUMIN SERPL-MCNC: 3.6 G/DL (ref 3.4–5)
ALP SERPL-CCNC: 72 U/L (ref 40–150)
ALT SERPL W P-5'-P-CCNC: 17 U/L (ref 0–50)
ANION GAP SERPL CALCULATED.3IONS-SCNC: 4 MMOL/L (ref 3–14)
AST SERPL W P-5'-P-CCNC: 16 U/L (ref 0–45)
BASOPHILS # BLD AUTO: 0.1 10E9/L (ref 0–0.2)
BASOPHILS NFR BLD AUTO: 1 %
BILIRUB SERPL-MCNC: 0.6 MG/DL (ref 0.2–1.3)
BUN SERPL-MCNC: 10 MG/DL (ref 7–30)
CALCIUM SERPL-MCNC: 8.2 MG/DL (ref 8.5–10.1)
CHLORIDE SERPL-SCNC: 109 MMOL/L (ref 94–109)
CO2 SERPL-SCNC: 25 MMOL/L (ref 20–32)
CREAT SERPL-MCNC: 1.03 MG/DL (ref 0.52–1.04)
CRP SERPL-MCNC: 3.5 MG/L (ref 0–8)
DIFFERENTIAL METHOD BLD: NORMAL
EOSINOPHIL # BLD AUTO: 0.1 10E9/L (ref 0–0.7)
EOSINOPHIL NFR BLD AUTO: 1.9 %
ERYTHROCYTE [DISTWIDTH] IN BLOOD BY AUTOMATED COUNT: 13 % (ref 10–15)
ERYTHROCYTE [SEDIMENTATION RATE] IN BLOOD BY WESTERGREN METHOD: 11 MM/H (ref 0–20)
GFR SERPL CREATININE-BSD FRML MDRD: 63 ML/MIN/{1.73_M2}
GLUCOSE SERPL-MCNC: 100 MG/DL (ref 70–99)
HBA1C MFR BLD: 5.7 % (ref 0–5.6)
HCT VFR BLD AUTO: 39.5 % (ref 35–47)
HGB BLD-MCNC: 13.1 G/DL (ref 11.7–15.7)
LYMPHOCYTES # BLD AUTO: 1.3 10E9/L (ref 0.8–5.3)
LYMPHOCYTES NFR BLD AUTO: 25.6 %
MCH RBC QN AUTO: 28.7 PG (ref 26.5–33)
MCHC RBC AUTO-ENTMCNC: 33.2 G/DL (ref 31.5–36.5)
MCV RBC AUTO: 87 FL (ref 78–100)
MONOCYTES # BLD AUTO: 0.4 10E9/L (ref 0–1.3)
MONOCYTES NFR BLD AUTO: 7.8 %
NEUTROPHILS # BLD AUTO: 3.3 10E9/L (ref 1.6–8.3)
NEUTROPHILS NFR BLD AUTO: 63.7 %
PLATELET # BLD AUTO: 299 10E9/L (ref 150–450)
POTASSIUM SERPL-SCNC: 4.3 MMOL/L (ref 3.4–5.3)
PROT SERPL-MCNC: 7.4 G/DL (ref 6.8–8.8)
RBC # BLD AUTO: 4.56 10E12/L (ref 3.8–5.2)
SODIUM SERPL-SCNC: 138 MMOL/L (ref 133–144)
TSH SERPL DL<=0.005 MIU/L-ACNC: 2.65 MU/L (ref 0.4–4)
WBC # BLD AUTO: 5.2 10E9/L (ref 4–11)

## 2021-03-19 PROCEDURE — 85652 RBC SED RATE AUTOMATED: CPT | Performed by: INTERNAL MEDICINE

## 2021-03-19 PROCEDURE — 80050 GENERAL HEALTH PANEL: CPT | Performed by: INTERNAL MEDICINE

## 2021-03-19 PROCEDURE — 83036 HEMOGLOBIN GLYCOSYLATED A1C: CPT | Performed by: INTERNAL MEDICINE

## 2021-03-19 PROCEDURE — 36415 COLL VENOUS BLD VENIPUNCTURE: CPT | Performed by: INTERNAL MEDICINE

## 2021-03-19 PROCEDURE — 86140 C-REACTIVE PROTEIN: CPT | Performed by: INTERNAL MEDICINE

## 2021-04-21 ENCOUNTER — TELEPHONE (OUTPATIENT)
Dept: FAMILY MEDICINE | Facility: CLINIC | Age: 51
End: 2021-04-21

## 2021-04-21 NOTE — TELEPHONE ENCOUNTER
Patient Quality Outreach      Summary:    Patient has the following on her problem list/HM:   Depression / Dysthymia review    6 Month Remission: 4-8 month window range:   12 Month Remission: 10-14 month window range:        PHQ-9 SCORE 4/9/2020 7/9/2020 3/10/2021   PHQ-9 Total Score - - -   PHQ-9 Total Score MyChart - 5 (Mild depression) -   PHQ-9 Total Score 8 5 9       If PHQ-9 recheck is 5 or more, route to provider for next steps.    Patient is due/failing the following:   Breast Cancer Screening - Mammogram, Cervical Cancer Screening - PAP Needed and Adult/Adolescent physical, date due: 4/22/2014    Type of outreach:    Sent IID message.    Questions for provider review:    None                                                                                                                                     Ina Winkler MA on 4/21/2021 at 5:58 PM       Chart routed to .

## 2021-04-28 ENCOUNTER — VIRTUAL VISIT (OUTPATIENT)
Dept: PHYSICAL MEDICINE AND REHAB | Facility: CLINIC | Age: 51
End: 2021-04-28
Payer: OTHER MISCELLANEOUS

## 2021-04-28 DIAGNOSIS — Z86.16 PERSONAL HISTORY OF COVID-19: ICD-10-CM

## 2021-04-28 DIAGNOSIS — R53.83 FATIGUE, UNSPECIFIED TYPE: Primary | ICD-10-CM

## 2021-04-28 PROCEDURE — 99215 OFFICE O/P EST HI 40 MIN: CPT | Mod: 95 | Performed by: INTERNAL MEDICINE

## 2021-04-28 SDOH — SOCIAL STABILITY: SOCIAL NETWORK: I HAVE TROUBLE DOING ALL OF THE FAMILY ACTIVITIES THAT I WANT TO DO: NEVER

## 2021-04-28 SDOH — SOCIAL STABILITY: SOCIAL NETWORK: I HAVE TROUBLE DOING ALL OF THE ACTIVITIES WITH FRIENDS THAT I WANT TO DO: NEVER

## 2021-04-28 SDOH — SOCIAL STABILITY: SOCIAL NETWORK: PROMIS ABILITY TO PARTICIPATE IN SOCIAL ROLES & ACTIVITIES T-SCORE: 64.1

## 2021-04-28 SDOH — SOCIAL STABILITY: SOCIAL NETWORK: I HAVE TROUBLE DOING ALL OF MY REGULAR LEISURE ACTIVITIES WITH OTHERS: NEVER

## 2021-04-28 SDOH — SOCIAL STABILITY: SOCIAL NETWORK: I HAVE TROUBLE DOING ALL OF MY USUAL WORK (INCLUDE WORK AT HOME): NEVER

## 2021-04-28 ASSESSMENT — ENCOUNTER SYMPTOMS
POLYPHAGIA: 0
INCREASED ENERGY: 1
BACK PAIN: 1
WEIGHT GAIN: 0
ARTHRALGIAS: 0
SPUTUM PRODUCTION: 0
WHEEZING: 0
HYPERTENSION: 0
LIGHT-HEADEDNESS: 0
LEG PAIN: 0
EXERCISE INTOLERANCE: 0
CHILLS: 0
ARTHRALGIAS: 0
CHILLS: 0
FATIGUE: 1
NECK PAIN: 0
WEIGHT LOSS: 0
JOINT SWELLING: 0
SYNCOPE: 0
HYPOTENSION: 0
POSTURAL DYSPNEA: 0
SYNCOPE: 0
COUGH DISTURBING SLEEP: 0
HEMOPTYSIS: 0
COUGH: 0
NIGHT SWEATS: 0
MYALGIAS: 1
STIFFNESS: 0
SHORTNESS OF BREATH: 1
HYPERTENSION: 0
EXERCISE INTOLERANCE: 0
INCREASED ENERGY: 1
ALTERED TEMPERATURE REGULATION: 0
MUSCLE WEAKNESS: 0
FATIGUE: 1
DECREASED APPETITE: 0
POSTURAL DYSPNEA: 0
NECK PAIN: 0
FEVER: 0
FEVER: 0
LEG PAIN: 0
DYSPNEA ON EXERTION: 1
COUGH: 0
MUSCLE CRAMPS: 1
ALTERED TEMPERATURE REGULATION: 0
POLYDIPSIA: 0
SNORES LOUDLY: 0
HYPOTENSION: 0
BACK PAIN: 1
SNORES LOUDLY: 0
MYALGIAS: 1
JOINT SWELLING: 0
MUSCLE WEAKNESS: 0
ORTHOPNEA: 0
WEIGHT GAIN: 0
LIGHT-HEADEDNESS: 0
SHORTNESS OF BREATH: 1
PALPITATIONS: 0
ORTHOPNEA: 0
HALLUCINATIONS: 0
POLYDIPSIA: 0
POLYPHAGIA: 0
DYSPNEA ON EXERTION: 1
HEMOPTYSIS: 0
HALLUCINATIONS: 0
WEIGHT LOSS: 0
PALPITATIONS: 0
SLEEP DISTURBANCES DUE TO BREATHING: 0
COUGH DISTURBING SLEEP: 0
NIGHT SWEATS: 0
DECREASED APPETITE: 0
SLEEP DISTURBANCES DUE TO BREATHING: 0
MUSCLE CRAMPS: 1
SPUTUM PRODUCTION: 0
WHEEZING: 0
STIFFNESS: 0

## 2021-04-28 ASSESSMENT — PATIENT HEALTH QUESTIONNAIRE - PHQ9
SUM OF ALL RESPONSES TO PHQ QUESTIONS 1-9: 2
SUM OF ALL RESPONSES TO PHQ QUESTIONS 1-9: 2
10. IF YOU CHECKED OFF ANY PROBLEMS, HOW DIFFICULT HAVE THESE PROBLEMS MADE IT FOR YOU TO DO YOUR WORK, TAKE CARE OF THINGS AT HOME, OR GET ALONG WITH OTHER PEOPLE: SOMEWHAT DIFFICULT

## 2021-04-28 ASSESSMENT — ANXIETY QUESTIONNAIRES
3. WORRYING TOO MUCH ABOUT DIFFERENT THINGS: NOT AT ALL
2. NOT BEING ABLE TO STOP OR CONTROL WORRYING: NOT AT ALL
7. FEELING AFRAID AS IF SOMETHING AWFUL MIGHT HAPPEN: NOT AT ALL
GAD7 TOTAL SCORE: 0
6. BECOMING EASILY ANNOYED OR IRRITABLE: NOT AT ALL
7. FEELING AFRAID AS IF SOMETHING AWFUL MIGHT HAPPEN: NOT AT ALL
1. FEELING NERVOUS, ANXIOUS, OR ON EDGE: NOT AT ALL
GAD7 TOTAL SCORE: 0
5. BEING SO RESTLESS THAT IT IS HARD TO SIT STILL: NOT AT ALL
4. TROUBLE RELAXING: NOT AT ALL
GAD7 TOTAL SCORE: 0

## 2021-04-28 NOTE — LETTER
4/28/2021       RE: Hailey Stark  [address]  [facility] [address]     Dear Colleague,    Thank you for referring your patient, Hailey Stark, to the [facility] PHYSICAL MEDICINE AND REHABILITATION CLINIC [place] [facility]. Please see a copy of my visit note below.    Hailey is a 51 year old who is being evaluated via a billable video visit.      How would you like to obtain your AVS? MyChart  If the video visit is dropped, the invitation should be resent by: Text to cell phone: [phone]  Will anyone else be joining your video visit? No      Video Start Time: 4:28 PM    Assessment & Plan     Fatigue, unspecified type  Patient continues to report significant fatigue.  She states that she has not started therapy is due to concerns about lungs.  She is scheduled for evaluation with pulmonary.  Recommend starting rehab therapies to address fatigue.  Patient expressed understanding and agreement with the plan.    Personal history of covid-19  Reviewed uncertainty with resolution of symptoms.  Patient was encouraged to start rehab.  We will follow-up in 6 weeks to review progress.    40 minutes spent on the date of the encounter doing chart review, history and exam, documentation and further activities per the note       Return in about 6 weeks (around 6/9/2021).    Fatoumata Rahman MD  [facility] PHYSICAL MEDICINE AND REHABILITATION CLINIC [place]    Subjective   Hailey is a 51 year old who presents for the following health issues   No chief complaint on file.      HPI     Pa is following up on several issues related to COVID-19. She is scheduled for pulmonary clinic tomorrow. She has not sche  Current Symptoms:      Goals of Care: decreasing fatigue    Current concerns: Health Concerns    PHQ Assesment Total Score(s) 4/28/2021   PHQ-9 Score 2   Some recent data might be hidden     TIBURCIO-7 Results 4/28/2021   TIBURCIO 7 TOTAL SCORE 0 (minimal  anxiety)   Some recent data might be hidden     PTSD Screen Score 4/28/2021   Have you ever experienced this kind of event? No   Some recent data might be hidden     PROMIS-29 4/28/2021   PROMIS Physical Function T-Score 45.3 (within normal limits)   PROMIS Anxiety T-Score 40.3 (within normal limits)   PROMIS Depression T-Score 41 (within normal limits)   PROMIS Fatigue T-Score 58.8 (mild)   PROMIS Sleep Disturbance T-Score 50.5 (within normal limits)   PROMIS Ability to Participate in Social Roles & Activities T-Score 64.1 (within normal limits)   PROMIS Pain Interference T-Score 41.6 (within normal limits)   PROMIS Pain Intensity 4     Work Productivity and Activity Impairment Questionnaire: General Health V2.0 4/28/2021   Are you currently employed (working for pay)? Yes   During the past seven days, how many hours did you miss from work because of your health problems?  0   During the past seven days, how many hours did you miss from work because of any other reason, such as vacation, holidays, time off to participate in this study? 0   During the past seven days, how many hours did you actually work? 40   During the past seven days, how much did your health problems affect your productivity while you were working? 4   During the past seven days, how much did your health problems affect your ability to do your regular activities, other than work at a job? 6       Past Medical History:   Diagnosis Date     Anxiety state, unspecified     Anxiety     Cardiac dysrhythmia, unspecified     Arrhythmia NOS s/p ablation     Chronic peptic ulcer, unspecified site, without mention of hemorrhage, perforation, or obstruction     Ulcer, Peptic     Depressive disorder, not elsewhere classified     Depression (non-psychotic)     Leukorrhea, not specified as infective 11/18/2009    heavy, daily, yellow/green mucoid dischg following retained tampon removal.Tx w flagyl, metrogel, Cleocin, Diflucan, doxycycline, boric acid  capsules. 5/16/2011 + GBS.      Menarche age 13    cycles q 30 x 5 d, heavy     Moderate dysplasia of cervix (KENNY II) 1998     Normal Papssince LEEP->routine screening       Past Surgical History:   Procedure Laterality Date     CARDIAC SURGERY  2000    Catheter ablation     Cervical Conization Loop Electrode Excision  1998    KENNY II  F/U Pap q 3 mo x 2 yrs were all NORMAL     COSMETIC SURGERY  2005& 2006    Liposuction     EYE SURGERY  2006    Lasix     KIDNEY SURGERY  summer 2012    6 kidney stone excision/stent placed     ORTHOPEDIC SURGERY  2012    Bunionectomy     SURGICAL HISTORY OF -       lasik     SURGICAL HISTORY OF -       catheter ablation heart atrial fib tx     VASCULAR SURGERY  Feb. 2020    Adhesive ablation       Family History   Problem Relation Age of Onset     Thyroid Disease Mother         removed     Cancer Paternal Grandmother         stomach cancer     Alzheimer Disease Paternal Grandmother      Depression Sister      Thyroid Disease Sister      Thyroid Disease Sister         on meds     Diabetes Brother      Depression Brother      Diabetes Nephew        Social History     Tobacco Use     Smoking status: Never Smoker     Smokeless tobacco: Never Used   Substance Use Topics     Alcohol use: Not Currently     Comment: 1-2 beers 1-2Xs/mo     Drug use: No       Current Outpatient Medications:      betamethasone valerate (VALISONE) 0.1 % external lotion, Apply topically 2 times daily Apply on affected areas twice a day for up to two weeks (Patient not taking: Reported on 11/19/2020), Disp: 60 mL, Rfl: 0     buPROPion (WELLBUTRIN XL) 150 MG 24 hr tablet, TAKE 1 TABLET(150 MG) BY MOUTH EVERY MORNING, Disp: 30 tablet, Rfl: 0     ibuprofen (ADVIL,MOTRIN) 800 MG tablet, Take 1 tablet (800 mg) by mouth every 8 hours as needed for moderate pain (Patient not taking: Reported on 7/10/2020), Disp: 30 tablet, Rfl: 0     ketoconazole (NIZORAL) 2 % external shampoo, Apply topically daily as needed for itching  or irritation (use for washing the hair twice a week) (Patient not taking: Reported on 11/19/2020), Disp: 120 mL, Rfl: 1     PARoxetine (PAXIL) 20 MG tablet, TAKE 1 TABLET(20 MG) BY MOUTH EVERY MORNING, Disp: 30 tablet, Rfl: 0    Review of Systems   Constitutional: Positive for fatigue. Negative for chills and fever.   Respiratory: Positive for shortness of breath. Negative for cough and wheezing.    Cardiovascular: Positive for chest pain. Negative for palpitations.   Endocrine: Negative for polydipsia and polyphagia.   Musculoskeletal: Positive for back pain and myalgias. Negative for arthralgias, joint swelling and neck pain.   Neurological: Negative for light-headedness.   Psychiatric/Behavioral: Negative for hallucinations.      Constitutional, HEENT, cardiovascular, pulmonary, GI, , musculoskeletal, neuro, skin, endocrine and psych systems are negative, except as otherwise noted.      Objective        Vitals:  No vitals were obtained today due to virtual visit.    Physical Exam   GENERAL: Healthy, alert and no distress  EYES: Eyes grossly normal to inspection.  No discharge or erythema, or obvious scleral/conjunctival abnormalities.  RESP: No audible wheeze, cough, or visible cyanosis.  No visible retractions or increased work of breathing.    SKIN: Visible skin clear. No significant rash, abnormal pigmentation or lesions.  NEURO: Cranial nerves grossly intact.  Mentation and speech appropriate for age.  PSYCH: Mentation appears normal, affect normal/bright, judgement and insight intact, normal speech and appearance well-groomed.    Video-Visit Details    Type of service:  Video Visit    Video End Time:4:35 PM    Originating Location (pt. Location): Home    Distant Location (provider location):  [facility] PHYSICAL MEDICINE AND REHABILITATION CLINIC [place]     Platform used for Video Visit: [employer]      Again, thank you for allowing me to participate in the care of your patient.       Sincerely,    Fatoumata Rahman MD

## 2021-04-28 NOTE — PROGRESS NOTES
Hailey is a 51 year old who is being evaluated via a billable video visit.      How would you like to obtain your AVS? MyChart  If the video visit is dropped, the invitation should be resent by: Text to cell phone: [phone]  Will anyone else be joining your video visit? No      Video Start Time: 4:28 PM    Assessment & Plan     Fatigue, unspecified type  Patient continues to report significant fatigue.  She states that she has not started therapy is due to concerns about lungs.  She is scheduled for evaluation with pulmonary.  Recommend starting rehab therapies to address fatigue.  Patient expressed understanding and agreement with the plan.    Personal history of covid-19  Reviewed uncertainty with resolution of symptoms.  Patient was encouraged to start rehab.  We will follow-up in 6 weeks to review progress.        40 minutes spent on the date of the encounter doing chart review, history and exam, documentation and further activities per the note           Return in about 6 weeks (around 6/9/2021).    Fatoumata Rahman MD  [facility] PHYSICAL MEDICINE AND REHABILITATION CLINIC [facility]   Hailey is a 51 year old who presents for the following health issues   No chief complaint on file.      HPI     Pa is following up on several issues related to COVID-19. She is scheduled for pulmonary clinic tomorrow. She has not sche  Current Symptoms:      Goals of Care: decreasing fatigue    Current concerns: Health Concerns    PHQ Assesment Total Score(s) 4/28/2021   PHQ-9 Score 2   Some recent data might be hidden     TIBURCIO-7 Results 4/28/2021   TIBURCIO 7 TOTAL SCORE 0 (minimal anxiety)   Some recent data might be hidden     PTSD Screen Score 4/28/2021   Have you ever experienced this kind of event? No   Some recent data might be hidden     PROMIS-29 4/28/2021   PROMIS Physical Function T-Score 45.3 (within normal limits)   PROMIS Anxiety T-Score 40.3 (within normal limits)   PROMIS Depression T-Score 41  (within normal limits)   PROMIS Fatigue T-Score 58.8 (mild)   PROMIS Sleep Disturbance T-Score 50.5 (within normal limits)   PROMIS Ability to Participate in Social Roles & Activities T-Score 64.1 (within normal limits)   PROMIS Pain Interference T-Score 41.6 (within normal limits)   PROMIS Pain Intensity 4     Work Productivity and Activity Impairment Questionnaire: General Health V2.0 4/28/2021   Are you currently employed (working for pay)? Yes   During the past seven days, how many hours did you miss from work because of your health problems?  0   During the past seven days, how many hours did you miss from work because of any other reason, such as vacation, holidays, time off to participate in this study? 0   During the past seven days, how many hours did you actually work? 40   During the past seven days, how much did your health problems affect your productivity while you were working? 4   During the past seven days, how much did your health problems affect your ability to do your regular activities, other than work at a job? 6       Past Medical History:   Diagnosis Date     Anxiety state, unspecified     Anxiety     Cardiac dysrhythmia, unspecified     Arrhythmia NOS s/p ablation     Chronic peptic ulcer, unspecified site, without mention of hemorrhage, perforation, or obstruction     Ulcer, Peptic     Depressive disorder, not elsewhere classified     Depression (non-psychotic)     Leukorrhea, not specified as infective 11/18/2009    heavy, daily, yellow/green mucoid dischg following retained tampon removal.Tx w flagyl, metrogel, Cleocin, Diflucan, doxycycline, boric acid capsules. 5/16/2011 + GBS.      Menarche age 13    cycles q 30 x 5 d, heavy     Moderate dysplasia of cervix (KENNY II) 1998     Normal Papssince LEEP->routine screening       Past Surgical History:   Procedure Laterality Date     CARDIAC SURGERY  2000    Catheter ablation     Cervical Conization Loop Electrode Excision  1998    KENNY II  F/U  Pap q 3 mo x 2 yrs were all NORMAL     COSMETIC SURGERY  2005& 2006    Liposuction     EYE SURGERY  2006    Lasix     KIDNEY SURGERY  summer 2012    6 kidney stone excision/stent placed     ORTHOPEDIC SURGERY  2012    Bunionectomy     SURGICAL HISTORY OF -       lasik     SURGICAL HISTORY OF -       catheter ablation heart atrial fib tx     VASCULAR SURGERY  Feb. 2020    Adhesive ablation       Family History   Problem Relation Age of Onset     Thyroid Disease Mother         removed     Cancer Paternal Grandmother         stomach cancer     Alzheimer Disease Paternal Grandmother      Depression Sister      Thyroid Disease Sister      Thyroid Disease Sister         on meds     Diabetes Brother      Depression Brother      Diabetes Nephew        Social History     Tobacco Use     Smoking status: Never Smoker     Smokeless tobacco: Never Used   Substance Use Topics     Alcohol use: Not Currently     Comment: 1-2 beers 1-2Xs/mo     Drug use: No         Current Outpatient Medications:      betamethasone valerate (VALISONE) 0.1 % external lotion, Apply topically 2 times daily Apply on affected areas twice a day for up to two weeks (Patient not taking: Reported on 11/19/2020), Disp: 60 mL, Rfl: 0     buPROPion (WELLBUTRIN XL) 150 MG 24 hr tablet, TAKE 1 TABLET(150 MG) BY MOUTH EVERY MORNING, Disp: 30 tablet, Rfl: 0     ibuprofen (ADVIL,MOTRIN) 800 MG tablet, Take 1 tablet (800 mg) by mouth every 8 hours as needed for moderate pain (Patient not taking: Reported on 7/10/2020), Disp: 30 tablet, Rfl: 0     ketoconazole (NIZORAL) 2 % external shampoo, Apply topically daily as needed for itching or irritation (use for washing the hair twice a week) (Patient not taking: Reported on 11/19/2020), Disp: 120 mL, Rfl: 1     PARoxetine (PAXIL) 20 MG tablet, TAKE 1 TABLET(20 MG) BY MOUTH EVERY MORNING, Disp: 30 tablet, Rfl: 0    Review of Systems   Constitutional: Positive for fatigue. Negative for chills and fever.   Respiratory:  Positive for shortness of breath. Negative for cough and wheezing.    Cardiovascular: Positive for chest pain. Negative for palpitations.   Endocrine: Negative for polydipsia and polyphagia.   Musculoskeletal: Positive for back pain and myalgias. Negative for arthralgias, joint swelling and neck pain.   Neurological: Negative for light-headedness.   Psychiatric/Behavioral: Negative for hallucinations.      Constitutional, HEENT, cardiovascular, pulmonary, GI, , musculoskeletal, neuro, skin, endocrine and psych systems are negative, except as otherwise noted.      Objective           Vitals:  No vitals were obtained today due to virtual visit.    Physical Exam   GENERAL: Healthy, alert and no distress  EYES: Eyes grossly normal to inspection.  No discharge or erythema, or obvious scleral/conjunctival abnormalities.  RESP: No audible wheeze, cough, or visible cyanosis.  No visible retractions or increased work of breathing.    SKIN: Visible skin clear. No significant rash, abnormal pigmentation or lesions.  NEURO: Cranial nerves grossly intact.  Mentation and speech appropriate for age.  PSYCH: Mentation appears normal, affect normal/bright, judgement and insight intact, normal speech and appearance well-groomed.                Video-Visit Details    Type of service:  Video Visit    Video End Time:4:35 PM    Originating Location (pt. Location): Home    Distant Location (provider location):  [facility] PHYSICAL MEDICINE AND REHABILITATION CLINIC [place]     Platform used for Video Visit: [employer]  Answers for HPI/ROS submitted by the patient on 4/28/2021   If you checked off any problems, how difficult have these problems made it for you to do your work, take care of things at home, or get along with other people?: Somewhat difficult  PHQ9 TOTAL SCORE: 2  TIBURCIO 7 TOTAL SCORE: 0  General Symptoms: Yes  Skin Symptoms: No  HENT Symptoms: No  EYE SYMPTOMS: No  HEART SYMPTOMS: Yes  LUNG SYMPTOMS: Yes  INTESTINAL  SYMPTOMS: No  URINARY SYMPTOMS: No  GYNECOLOGIC SYMPTOMS: No  BREAST SYMPTOMS: No  SKELETAL SYMPTOMS: Yes  BLOOD SYMPTOMS: No  NERVOUS SYSTEM SYMPTOMS: No  MENTAL HEALTH SYMPTOMS: No  Loss of appetite: No  Weight loss: No  Weight gain: No  Night sweats: No  Increased stress: No  Feeling hot or cold when others believe the temperature is normal: No  Loss of height: No  Post-operative complications: No  Surgical site pain: No  Change in or Loss of Energy: Yes  Hyperactivity: No  Confusion: No  Sputum or phlegm: No  Coughing up blood: No  Snoring: No  Difficulty breathing on exertion: Yes  Nighttime Cough: No  Difficulty breathing when lying flat: No  Pain in legs with walking: No  Trouble breathing while lying down: No  Fingers or toes appear blue: No  High blood pressure: No  Low blood pressure: No  Fainting: No  Murmurs: No  Pacemaker: No  Varicose veins: No  Edema or swelling: No  Wake up at night with shortness of breath: No  Exercise intolerance: No  Bone pain: No  Muscle cramps: Yes  Muscle weakness: No  Joint stiffness: No  Bone fracture: No

## 2021-04-29 ENCOUNTER — OFFICE VISIT (OUTPATIENT)
Dept: PULMONOLOGY | Facility: CLINIC | Age: 51
End: 2021-04-29
Payer: OTHER MISCELLANEOUS

## 2021-04-29 ENCOUNTER — PRE VISIT (OUTPATIENT)
Dept: PULMONOLOGY | Facility: CLINIC | Age: 51
End: 2021-04-29

## 2021-04-29 ENCOUNTER — ANCILLARY PROCEDURE (OUTPATIENT)
Dept: GENERAL RADIOLOGY | Facility: CLINIC | Age: 51
End: 2021-04-29
Payer: OTHER MISCELLANEOUS

## 2021-04-29 VITALS
HEIGHT: 70 IN | BODY MASS INDEX: 24.05 KG/M2 | RESPIRATION RATE: 17 BRPM | OXYGEN SATURATION: 99 % | HEART RATE: 67 BPM | SYSTOLIC BLOOD PRESSURE: 131 MMHG | WEIGHT: 168 LBS | DIASTOLIC BLOOD PRESSURE: 85 MMHG

## 2021-04-29 DIAGNOSIS — J45.20 MILD INTERMITTENT REACTIVE AIRWAY DISEASE WITHOUT COMPLICATION: Primary | ICD-10-CM

## 2021-04-29 DIAGNOSIS — R06.09 DYSPNEA ON EXERTION: ICD-10-CM

## 2021-04-29 PROCEDURE — G0463 HOSPITAL OUTPT CLINIC VISIT: HCPCS

## 2021-04-29 PROCEDURE — 94726 PLETHYSMOGRAPHY LUNG VOLUMES: CPT | Performed by: INTERNAL MEDICINE

## 2021-04-29 PROCEDURE — 71046 X-RAY EXAM CHEST 2 VIEWS: CPT | Mod: GC | Performed by: RADIOLOGY

## 2021-04-29 PROCEDURE — 94729 DIFFUSING CAPACITY: CPT | Performed by: INTERNAL MEDICINE

## 2021-04-29 PROCEDURE — 94375 RESPIRATORY FLOW VOLUME LOOP: CPT | Performed by: INTERNAL MEDICINE

## 2021-04-29 PROCEDURE — 99204 OFFICE O/P NEW MOD 45 MIN: CPT | Mod: 25 | Performed by: INTERNAL MEDICINE

## 2021-04-29 RX ORDER — IPRATROPIUM BROMIDE AND ALBUTEROL 20; 100 UG/1; UG/1
1 SPRAY, METERED RESPIRATORY (INHALATION) 4 TIMES DAILY
Qty: 4 G | Refills: 1 | Status: SHIPPED | OUTPATIENT
Start: 2021-04-29 | End: 2021-08-12

## 2021-04-29 ASSESSMENT — ANXIETY QUESTIONNAIRES: GAD7 TOTAL SCORE: 0

## 2021-04-29 ASSESSMENT — PAIN SCALES - GENERAL: PAINLEVEL: NO PAIN (0)

## 2021-04-29 ASSESSMENT — PATIENT HEALTH QUESTIONNAIRE - PHQ9: SUM OF ALL RESPONSES TO PHQ QUESTIONS 1-9: 2

## 2021-04-29 ASSESSMENT — MIFFLIN-ST. JEOR: SCORE: 1462.29

## 2021-04-29 NOTE — NURSING NOTE
Chief Complaint   Patient presents with     Consult     Dyspnea on exertion     Medications reviewed and updated.  Vitals taken  Lisa Inman CMA

## 2021-04-29 NOTE — LETTER
4/29/2021         RE: Hailey Stark  [address]  [facility] [address]        Dear Colleague,    Thank you for referring your patient, Hailey Stark, to the [facility] [place]. Please see a copy of my visit note below.    [facility]  Pulmonary Medicine  Visit Clinic Note  April 29, 2021         ASSESSMENT & PLAN   Patient is a 51-year-old female who presents to pulmonary clinic for post Covid follow-up.    #Post Covid follow-up  -Patient has normal chest x-ray and normal pulmonary function test.  -Ordered an echo for assessment of cardiac function.  Prescribed Breo inhaler and as needed Combivent for reactive airway disease.  -The patient most of the symptoms are secondary to post Covid fatigue syndrome which has been seen in multiple patients and it might take a few months for her to recover.  -Completely agree with neuropsychological assessment and referral for her ongoing possible brain fog.  -Provided encouragement to the patient and also recommended to follow-up with the sleep physician for her ongoing fatigue and weakness.    -Patient has episodes of constant chest pressure.  As of now we will get an echo everything normal and symptoms continue present 2 months from now then might do stress echo.  Patient is a female less than 60 years old arguing against any underlying coronary artery disease.      RTC in 10 weeks    I explained the lab values, imagings and findings to the patient.  Patient expressed understanding I did not recognize any barriers to the understanding of the patient.    The above note was dictated using voice recognition software and may include typographical errors. Please contact the author for any clarifications.    Riaz Keller MD   Clinic Number: [phone]         Today's visit note:     Chief Complaint: Hailey Stark is a 51 year old year old female who is being seen for Consult (Dyspnea on  "exertion)      HPI:     # Got covid in November.  She came back to work 3 days after and had to take a week off. She started having chest pain , shortness of breath. She can not climb stairs. Gets winded.  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 Has been sleeping 10-12 hours a day.   -Mild improvement since November but no major improvement has been noted.    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 From [place].   - Has one cat. Dog passed away.          Medications:     Current Outpatient Medications   Medication     buPROPion (WELLBUTRIN XL) 150 MG 24 hr tablet     ibuprofen (ADVIL,MOTRIN) 800 MG tablet     ketoconazole (NIZORAL) 2 % external shampoo     PARoxetine (PAXIL) 20 MG tablet     No current facility-administered medications for this visit.             Review of Systems:       A complete 10 point review of systems was otherwise negative except as noted in the HPI.        PHYSICAL EXAM:  /85   Pulse 67   Resp 17   Ht 1.778 m (5' 10\")   Wt 76.2 kg (168 lb)   SpO2 99%   BMI 24.11 kg/m       General: Well developed, well nourished, No apparent distress  Eyes: Anicteric  Nose: Nasal mucosa with no edema or hyperemia.  No polyps  Ears: Hearing grossly normal  Mouth: Oral mucosa is moist, without any lesions. No oropharyngeal exudate.  Neck: supple, no thyromegaly  Lymphatics: No cervical or supraclavicular nodes  Respiratory: Good air movement. No crackles. No rhonchi. No wheezes  Cardiac: RRR, normal S1, S2. No murmurs. No JVD  Abdomen: Soft, NT/ND  Musculoskeletal: Extremities normal. No clubbing. No cyanosis. No edema.  Skin: No rash on limited exam  Neuro: Normal mentation. Normal speech.  Psych:Normal affect           Data:   All laboratory and imaging data reviewed.      PFT:       PFT Interpretation:  I personally reviewed and interpreted the PFTs.  -Patient has irregular expiratory and inspiratory flow " pattern.  -Normal lung volumes are noted.  No obstructive lung disease is noted.  Normal diffusion capacity is noted.  -Normal pulmonary function test as noted    CXR: I personally reviewed and interpreted the chest x-ray  -Normal chest x-ray is noted          Recent Results (from the past 168 hour(s))   General PFT Lab (Please always keep checked)    Collection Time: 04/29/21  3:02 PM   Result Value Ref Range    FVC-Pred 4.09 L    FVC-Pre 4.03 L    FVC-%Pred-Pre 98 %    FEV1-Pre 3.20 L    FEV1-%Pred-Pre 98 %    FEV1FVC-Pred 80 %    FEV1FVC-Pre 79 %    FEFMax-Pred 7.53 L/sec    FEFMax-Pre 5.10 L/sec    FEFMax-%Pred-Pre 67 %    FEF2575-Pred 3.04 L/sec    FEF2575-Pre 3.13 L/sec    KDU9794-%Pred-Pre 103 %    ExpTime-Pre 6.40 sec    FIFMax-Pre 4.98 L/sec    VC-Pred 4.12 L    VC-Pre 3.89 L    VC-%Pred-Pre 94 %    IC-Pred 3.00 L    IC-Pre 2.73 L    IC-%Pred-Pre 90 %    ERV-Pred 1.12 L    ERV-Pre 1.17 L    ERV-%Pred-Pre 104 %    FEV1FEV6-Pred 82 %    FEV1FEV6-Pre 79 %    FRCPleth-Pred 2.98 L    FRCPleth-Pre 3.02 L    FRCPleth-%Pred-Pre 101 %    RVPleth-Pred 1.97 L    RVPleth-Pre 1.85 L    RVPleth-%Pred-Pre 94 %    TLCPleth-Pred 5.78 L    TLCPleth-Pre 5.75 L    TLCPleth-%Pred-Pre 99 %    DLCOunc-Pred 24.79 ml/min/mmHg    DLCOunc-Pre 24.09 ml/min/mmHg    DLCOunc-%Pred-Pre 97 %    VA-Pre 5.52 L    VA-%Pred-Pre 92 %    FEV1SVC-Pred 79 %    FEV1SVC-Pre 82 %                                       Answers for HPI/ROS submitted by the patient on 4/28/2021   General Symptoms: Yes  Skin Symptoms: No  HENT Symptoms: No  EYE SYMPTOMS: No  HEART SYMPTOMS: Yes  LUNG SYMPTOMS: Yes  INTESTINAL SYMPTOMS: No  URINARY SYMPTOMS: No  GYNECOLOGIC SYMPTOMS: No  BREAST SYMPTOMS: No  SKELETAL SYMPTOMS: Yes  BLOOD SYMPTOMS: No  NERVOUS SYSTEM SYMPTOMS: No  MENTAL HEALTH SYMPTOMS: No  Fever: No  Loss of appetite: No  Weight loss: No  Weight gain: No  Fatigue: Yes  Night sweats: No  Chills: No  Increased stress: No  Excessive hunger: No  Excessive  thirst: No  Feeling hot or cold when others believe the temperature is normal: No  Loss of height: No  Post-operative complications: No  Surgical site pain: No  Hallucinations: No  Change in or Loss of Energy: Yes  Hyperactivity: No  Confusion: No  Cough: No  Sputum or phlegm: No  Coughing up blood: No  Difficulty breating or shortness of breath: Yes  Snoring: No  Wheezing: No  Difficulty breathing on exertion: Yes  Nighttime Cough: No  Difficulty breathing when lying flat: No  Chest pain or pressure: Yes  Fast or irregular heartbeat: No  Pain in legs with walking: No  Trouble breathing while lying down: No  Fingers or toes appear blue: No  High blood pressure: No  Low blood pressure: No  Fainting: No  Murmurs: No  Pacemaker: No  Varicose veins: No  Edema or swelling: No  Wake up at night with shortness of breath: No  Light-headedness: No  Exercise intolerance: No  Back pain: Yes  Muscle aches: Yes  Neck pain: No  Swollen joints: No  Joint pain: No  Bone pain: No  Muscle cramps: Yes  Muscle weakness: No  Joint stiffness: No  Bone fracture: No        Again, thank you for allowing me to participate in the care of your patient.        Sincerely,        Riaz Keller MD

## 2021-04-29 NOTE — PROGRESS NOTES
[facility]  Pulmonary Medicine  Visit Clinic Note  April 29, 2021         ASSESSMENT & PLAN   Patient is a 51-year-old female who presents to pulmonary clinic for post Covid follow-up.    #Post Covid follow-up  -Patient has normal chest x-ray and normal pulmonary function test.  -Ordered an echo for assessment of cardiac function.  Prescribed Breo inhaler and as needed Combivent for reactive airway disease.  -The patient most of the symptoms are secondary to post Covid fatigue syndrome which has been seen in multiple patients and it might take a few months for her to recover.  -Completely agree with neuropsychological assessment and referral for her ongoing possible brain fog.  -Provided encouragement to the patient and also recommended to follow-up with the sleep physician for her ongoing fatigue and weakness.    -Patient has episodes of constant chest pressure.  As of now we will get an echo everything normal and symptoms continue present 2 months from now then might do stress echo.  Patient is a female less than 60 years old arguing against any underlying coronary artery disease.      RTC in 10 weeks    I explained the lab values, imagings and findings to the patient.  Patient expressed understanding I did not recognize any barriers to the understanding of the patient.    The above note was dictated using voice recognition software and may include typographical errors. Please contact the author for any clarifications.    Riaz Keller MD   Clinic Number: [phone]         Today's visit note:     Chief Complaint: Hailey Stark is a 51 year old year old female who is being seen for Consult (Dyspnea on exertion)      HPI:     # Got covid in November.  She came back to work 3 days after and had to take a week off. She started having chest pain , shortness of breath. She can not climb stairs. Gets winded.  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 Has been  "sleeping 10-12 hours a day.   -Mild improvement since November but no major improvement has been noted.    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   - Has one cat. Dog passed away.          Medications:     Current Outpatient Medications   Medication     buPROPion (WELLBUTRIN XL) 150 MG 24 hr tablet     ibuprofen (ADVIL,MOTRIN) 800 MG tablet     ketoconazole (NIZORAL) 2 % external shampoo     PARoxetine (PAXIL) 20 MG tablet     No current facility-administered medications for this visit.             Review of Systems:       A complete 10 point review of systems was otherwise negative except as noted in the HPI.        PHYSICAL EXAM:  /85   Pulse 67   Resp 17   Ht 1.778 m (5' 10\")   Wt 76.2 kg (168 lb)   SpO2 99%   BMI 24.11 kg/m       General: Well developed, well nourished, No apparent distress  Eyes: Anicteric  Nose: Nasal mucosa with no edema or hyperemia.  No polyps  Ears: Hearing grossly normal  Mouth: Oral mucosa is moist, without any lesions. No oropharyngeal exudate.  Neck: supple, no thyromegaly  Lymphatics: No cervical or supraclavicular nodes  Respiratory: Good air movement. No crackles. No rhonchi. No wheezes  Cardiac: RRR, normal S1, S2. No murmurs. No JVD  Abdomen: Soft, NT/ND  Musculoskeletal: Extremities normal. No clubbing. No cyanosis. No edema.  Skin: No rash on limited exam  Neuro: Normal mentation. Normal speech.  Psych:Normal affect           Data:   All laboratory and imaging data reviewed.      PFT:       PFT Interpretation:  I personally reviewed and interpreted the PFTs.  -Patient has irregular expiratory and inspiratory flow pattern.  -Normal lung volumes are noted.  No obstructive lung disease is noted.  Normal diffusion capacity is noted.  -Normal pulmonary function test as noted    CXR: I personally reviewed and interpreted the chest x-ray  -Normal chest x-ray is noted          Recent Results (from the past 168 hour(s)) "   General PFT Lab (Please always keep checked)    Collection Time: 04/29/21  3:02 PM   Result Value Ref Range    FVC-Pred 4.09 L    FVC-Pre 4.03 L    FVC-%Pred-Pre 98 %    FEV1-Pre 3.20 L    FEV1-%Pred-Pre 98 %    FEV1FVC-Pred 80 %    FEV1FVC-Pre 79 %    FEFMax-Pred 7.53 L/sec    FEFMax-Pre 5.10 L/sec    FEFMax-%Pred-Pre 67 %    FEF2575-Pred 3.04 L/sec    FEF2575-Pre 3.13 L/sec    WFX5968-%Pred-Pre 103 %    ExpTime-Pre 6.40 sec    FIFMax-Pre 4.98 L/sec    VC-Pred 4.12 L    VC-Pre 3.89 L    VC-%Pred-Pre 94 %    IC-Pred 3.00 L    IC-Pre 2.73 L    IC-%Pred-Pre 90 %    ERV-Pred 1.12 L    ERV-Pre 1.17 L    ERV-%Pred-Pre 104 %    FEV1FEV6-Pred 82 %    FEV1FEV6-Pre 79 %    FRCPleth-Pred 2.98 L    FRCPleth-Pre 3.02 L    FRCPleth-%Pred-Pre 101 %    RVPleth-Pred 1.97 L    RVPleth-Pre 1.85 L    RVPleth-%Pred-Pre 94 %    TLCPleth-Pred 5.78 L    TLCPleth-Pre 5.75 L    TLCPleth-%Pred-Pre 99 %    DLCOunc-Pred 24.79 ml/min/mmHg    DLCOunc-Pre 24.09 ml/min/mmHg    DLCOunc-%Pred-Pre 97 %    VA-Pre 5.52 L    VA-%Pred-Pre 92 %    FEV1SVC-Pred 79 %    FEV1SVC-Pre 82 %                                       Answers for HPI/ROS submitted by the patient on 4/28/2021   General Symptoms: Yes  Skin Symptoms: No  HENT Symptoms: No  EYE SYMPTOMS: No  HEART SYMPTOMS: Yes  LUNG SYMPTOMS: Yes  INTESTINAL SYMPTOMS: No  URINARY SYMPTOMS: No  GYNECOLOGIC SYMPTOMS: No  BREAST SYMPTOMS: No  SKELETAL SYMPTOMS: Yes  BLOOD SYMPTOMS: No  NERVOUS SYSTEM SYMPTOMS: No  MENTAL HEALTH SYMPTOMS: No  Fever: No  Loss of appetite: No  Weight loss: No  Weight gain: No  Fatigue: Yes  Night sweats: No  Chills: No  Increased stress: No  Excessive hunger: No  Excessive thirst: No  Feeling hot or cold when others believe the temperature is normal: No  Loss of height: No  Post-operative complications: No  Surgical site pain: No  Hallucinations: No  Change in or Loss of Energy: Yes  Hyperactivity: No  Confusion: No  Cough: No  Sputum or phlegm: No  Coughing up blood:  No  Difficulty breating or shortness of breath: Yes  Snoring: No  Wheezing: No  Difficulty breathing on exertion: Yes  Nighttime Cough: No  Difficulty breathing when lying flat: No  Chest pain or pressure: Yes  Fast or irregular heartbeat: No  Pain in legs with walking: No  Trouble breathing while lying down: No  Fingers or toes appear blue: No  High blood pressure: No  Low blood pressure: No  Fainting: No  Murmurs: No  Pacemaker: No  Varicose veins: No  Edema or swelling: No  Wake up at night with shortness of breath: No  Light-headedness: No  Exercise intolerance: No  Back pain: Yes  Muscle aches: Yes  Neck pain: No  Swollen joints: No  Joint pain: No  Bone pain: No  Muscle cramps: Yes  Muscle weakness: No  Joint stiffness: No  Bone fracture: No

## 2021-04-30 LAB
DLCOUNC-%PRED-PRE: 97 %
DLCOUNC-PRE: 24.09 ML/MIN/MMHG
DLCOUNC-PRED: 24.79 ML/MIN/MMHG
ERV-%PRED-PRE: 104 %
ERV-PRE: 1.17 L
ERV-PRED: 1.12 L
EXPTIME-PRE: 6.4 SEC
FEF2575-%PRED-PRE: 103 %
FEF2575-PRE: 3.13 L/SEC
FEF2575-PRED: 3.04 L/SEC
FEFMAX-%PRED-PRE: 67 %
FEFMAX-PRE: 5.1 L/SEC
FEFMAX-PRED: 7.53 L/SEC
FEV1-%PRED-PRE: 98 %
FEV1-PRE: 3.2 L
FEV1FEV6-PRE: 79 %
FEV1FEV6-PRED: 82 %
FEV1FVC-PRE: 79 %
FEV1FVC-PRED: 80 %
FEV1SVC-PRE: 82 %
FEV1SVC-PRED: 79 %
FIFMAX-PRE: 4.98 L/SEC
FRCPLETH-%PRED-PRE: 101 %
FRCPLETH-PRE: 3.02 L
FRCPLETH-PRED: 2.98 L
FVC-%PRED-PRE: 98 %
FVC-PRE: 4.03 L
FVC-PRED: 4.09 L
IC-%PRED-PRE: 90 %
IC-PRE: 2.73 L
IC-PRED: 3 L
RVPLETH-%PRED-PRE: 94 %
RVPLETH-PRE: 1.85 L
RVPLETH-PRED: 1.97 L
TLCPLETH-%PRED-PRE: 99 %
TLCPLETH-PRE: 5.75 L
TLCPLETH-PRED: 5.78 L
VA-%PRED-PRE: 92 %
VA-PRE: 5.52 L
VC-%PRED-PRE: 94 %
VC-PRE: 3.89 L
VC-PRED: 4.12 L

## 2021-05-02 ASSESSMENT — ENCOUNTER SYMPTOMS
FATIGUE: 1
POLYPHAGIA: 0
POLYDIPSIA: 0
CHILLS: 0
HALLUCINATIONS: 0
LIGHT-HEADEDNESS: 0
SHORTNESS OF BREATH: 1
ARTHRALGIAS: 0
NECK PAIN: 0
FEVER: 0
WHEEZING: 0
JOINT SWELLING: 0
COUGH: 0
BACK PAIN: 1
PALPITATIONS: 0
MYALGIAS: 1

## 2021-05-03 ENCOUNTER — TELEPHONE (OUTPATIENT)
Dept: PULMONOLOGY | Facility: CLINIC | Age: 51
End: 2021-05-03

## 2021-05-03 NOTE — TELEPHONE ENCOUNTER
Central Prior Authorization Team   [phone]    PA Initiation    Medication: Combivent Respimat  MCG/ACT  Insurance Company: [employer] [place] - Phone [phone] Fax [phone]  Pharmacy Filling the Rx: [id number] - [place], [place] - [address] AT [facility] [address]  Filling Pharmacy Phone: [phone]  Filling Pharmacy Fax: [phone]  Start Date: 5/3/2021

## 2021-05-03 NOTE — TELEPHONE ENCOUNTER
Central Prior Authorization Team   [phone]    PA Initiation    Medication: Breo Ellipta 200-25MG INH  Insurance Company: [facility] - Phone [phone] Fax [phone]  Pharmacy Filling the Rx: [id number] - [place], [place] - [address] AT [facility] [address]  Filling Pharmacy Phone: [phone]  Filling Pharmacy Fax: [phone]  Start Date: 5/3/2021

## 2021-05-03 NOTE — TELEPHONE ENCOUNTER
Prior Authorization Retail Medication Request    Medication/Dose: Combivent Respimat  MCG/ACT  ICD code (if different than what is on RX):    Previously Tried and Failed:    Rationale:      Insurance Name:    Insurance ID:        Pharmacy Information (if different than what is on RX)  Name:  [facility]  Phone:  [phone]

## 2021-05-03 NOTE — TELEPHONE ENCOUNTER
Prior Authorization Retail Medication Request    Medication/Dose: Breo Ellipta 200-25 MCG INH  ICD code (if different than what is on RX):    Previously Tried and Failed:    Rationale:      Insurance Name:    Insurance ID:        Pharmacy Information (if different than what is on RX)  Name:  [facility]  Phone:  [phone]

## 2021-05-04 NOTE — TELEPHONE ENCOUNTER
Prior Authorization Not Needed per Insurance    Medication: Combivent Respimat  MCG/ACT-PA NOT NEEDED  Insurance Company: [employer] [place] - Phone [phone] Fax [phone]  Expected CoPay: $435    Pharmacy Filling the Rx: [id number] - [place], [place] - [address] AT [facility] [address]  Pharmacy Notified: Yes  Patient Notified: No    Called pharmacy and pharmacy stated that PA is Not Needed and medication is covered. Spoke to Gerri and she stated that they have a paid claim on medication; however, co-pay is high. Requested pharmacy to contact patient on cost prior to picking up medication. Cash price is $555. Insurance also states that PA is Not Needed and medication is covered.

## 2021-05-05 NOTE — TELEPHONE ENCOUNTER
Prior Authorization Not Needed per Insurance    Medication: Breo Ellipta 200-25MG INH-PA NOT NEEDED   Insurance Company: [employer] [place] - Phone [phone] Fax [phone]  Expected CoPay:  $365    Pharmacy Filling the Rx: [facility] #[id number] - [place], [place] - [address] AT [facility] [address]  Pharmacy Notified: Yes  Patient Notified: No    Called pharmacy and pharmacy stated that PA is Not Needed and medication is covered. Spoke to Gerri at filling pharmacy and she stated that they have a paid claim on medication; however, co-pay is high. Requested pharmacy to contact patient prior to picking up medication. Cash price is $464. Insurance also stated that PA is Not Needed and medication is covered.

## 2021-08-09 ENCOUNTER — MYC MEDICAL ADVICE (OUTPATIENT)
Dept: PULMONOLOGY | Facility: CLINIC | Age: 51
End: 2021-08-09

## 2021-08-09 DIAGNOSIS — J45.20 MILD INTERMITTENT REACTIVE AIRWAY DISEASE WITHOUT COMPLICATION: ICD-10-CM

## 2021-08-09 DIAGNOSIS — R06.09 DYSPNEA ON EXERTION: ICD-10-CM

## 2021-08-12 RX ORDER — IPRATROPIUM BROMIDE AND ALBUTEROL 20; 100 UG/1; UG/1
1 SPRAY, METERED RESPIRATORY (INHALATION) 4 TIMES DAILY
Qty: 4 G | Refills: 3 | Status: SHIPPED | OUTPATIENT
Start: 2021-08-12 | End: 2021-12-10

## 2021-08-27 ENCOUNTER — OFFICE VISIT (OUTPATIENT)
Dept: CARDIOLOGY | Facility: CLINIC | Age: 51
End: 2021-08-27
Attending: STUDENT IN AN ORGANIZED HEALTH CARE EDUCATION/TRAINING PROGRAM
Payer: OTHER MISCELLANEOUS

## 2021-08-27 VITALS
BODY MASS INDEX: 26.66 KG/M2 | OXYGEN SATURATION: 98 % | DIASTOLIC BLOOD PRESSURE: 79 MMHG | HEART RATE: 81 BPM | SYSTOLIC BLOOD PRESSURE: 121 MMHG | WEIGHT: 180 LBS | HEIGHT: 69 IN

## 2021-08-27 DIAGNOSIS — R00.2 PALPITATIONS: ICD-10-CM

## 2021-08-27 PROCEDURE — 93246 EXT ECG>7D<15D RECORDING: CPT

## 2021-08-27 PROCEDURE — 93005 ELECTROCARDIOGRAM TRACING: CPT

## 2021-08-27 PROCEDURE — G0463 HOSPITAL OUTPT CLINIC VISIT: HCPCS

## 2021-08-27 PROCEDURE — 99204 OFFICE O/P NEW MOD 45 MIN: CPT | Mod: 25 | Performed by: STUDENT IN AN ORGANIZED HEALTH CARE EDUCATION/TRAINING PROGRAM

## 2021-08-27 PROCEDURE — 93248 EXT ECG>7D<15D REV&INTERPJ: CPT | Mod: 59 | Performed by: INTERNAL MEDICINE

## 2021-08-27 ASSESSMENT — MIFFLIN-ST. JEOR: SCORE: 1500.85

## 2021-08-27 ASSESSMENT — PAIN SCALES - GENERAL: PAINLEVEL: MODERATE PAIN (5)

## 2021-08-27 NOTE — NURSING NOTE
Mireya ordered    Rehab ordered for COVID pain    Kenneth Frey, RN   Cardiology Nurse Coordinator

## 2021-08-27 NOTE — NURSING NOTE
Chief Complaint   Patient presents with     New Patient     Palpitations      Vitals were taken and medications were reconciled. EKG was performed   AMARI Shane  3:20 PM

## 2021-08-27 NOTE — PATIENT INSTRUCTIONS
Cardiology Providers you saw during your visit:  Dr. Yung    Medication changes: None    Follow up:     Pain rehab referral    Echocardiogram     14 day Ziopatch heart monitor      Follow the American Heart Association Diet and Lifestyle recommendations:  Limit saturated fat, trans fat, sodium, red meat, sweets and sugar-sweetened beverages. If you choose to eat red meat, compare labels and select the leanest cuts available.  Aim for at least 150 minutes of moderate physical activity or 75 minutes of vigorous physical activity - or an equal combination of both - each week.      If you have any questions, call  Kenneth Frey RN, at [phone].  Press Option #1 for the [facility], and then press Option #4  We are encouraging the use of [employer] to communicate with your HealthCare Provider      After hours, weekends or holidays: On Call Cardiologist- [phone] option #4 and ask to speak to the on-call Cardiologist.

## 2021-08-27 NOTE — PROGRESS NOTES
Chief Complaint: Palpitations    HPI: Hailey Stark is a 51 year old female with a past medical history of AVNRT status post ablation (2000) who was referred to me for evaluation of palpitations by Dr. Fatoumata Rahman.     At baseline, Mrs. Stark is a highly active female who went to the gym several times per week. She was able to exercise seven miles on the elliptical . She works as an LPN at [employer].     Mrs. Stark was in her usual state of health till October 2020, at which point she was exposed to COVID-19 at work. She developed mild symptoms and was subsequently diagnosed with COVID-19. Mrs. Stark was asked to quarantine for 14 days. She went back for 4 days but had to stop given that she developed chest pressure and exertional dyspnea.  Since then, she has been followed up in the [place] COVID clinic.  She was most recently seen by my Pulmonary colleague Dr. Riaz Keller in April 2021.  At that time, she was noted to have episodes of constant chest pressure and significant dyspnea on exertion. The dyspnea on exertion limited her to walking less than a 100 yards at a time and, at time, left her too dyspneic to talk. She still feels like she has 'brain fog'/  Dr. Keller and the patient decided that if his symptoms continue to present for two months from the time of the consultation, exercise stress echocardiogram would be sought. Mrs. Stark found that her symptoms did not improve, so she sought Cardiology evaluation.    At the time of the consultation, Mrs. Stark's chief complaint is the significant dyspnea on exertion that she experiences. Today, she stated that she occasionally felt dyspneic while speaking. Mrs. Stark also notes that she has fatigue and constant, unremitting chest pressure that occasionally worsens when she walks around and is occasionally associated with palpitations. There is no radiation or relieving factors. This pain and palpitations are quite similar to what she had  before her AVNRT ablation in 2000. Finally, she noted pulsations in her neck when exercising. She notes an absence of PND, orthopnea, peripheral edema, or syncope.      A comprehensive ROS was done and the details are included above in the HPI.    Past Medical History:  - AVNRT status post ablation in 2000  - No prior history of hypertension, T2DM, dyslipidemia, CAD, TIA/stroke, vascular aneurysms, PAD  Past Medical History:   Diagnosis Date     Anxiety state, unspecified     Anxiety     Cardiac dysrhythmia, unspecified     Arrhythmia NOS s/p ablation     Chronic peptic ulcer, unspecified site, without mention of hemorrhage, perforation, or obstruction     Ulcer, Peptic     Depressive disorder, not elsewhere classified     Depression (non-psychotic)     Leukorrhea, not specified as infective 11/18/2009    heavy, daily, yellow/green mucoid dischg following retained tampon removal.Tx w flagyl, metrogel, Cleocin, Diflucan, doxycycline, boric acid capsules. 5/16/2011 + GBS.      Menarche age 13    cycles q 30 x 5 d, heavy     Moderate dysplasia of cervix (KENNY II) 1998     Normal Papssince LEEP->routine screening       Past Surgical History:    Past Surgical History:   Procedure Laterality Date     CARDIAC SURGERY  2000    Catheter ablation     Cervical Conization Loop Electrode Excision  1998    KENNY II  F/U Pap q 3 mo x 2 yrs were all NORMAL     COSMETIC SURGERY  2005& 2006    Liposuction     EYE SURGERY  2006    Lasix     KIDNEY SURGERY  summer 2012    6 kidney stone excision/stent placed     ORTHOPEDIC SURGERY  2012    Bunionectomy     SURGICAL HISTORY OF -       lasik     SURGICAL HISTORY OF -       catheter ablation heart atrial fib tx     VASCULAR SURGERY  Feb. 2020    Adhesive ablation       Drug History:  Home cardiac meds: None.  Current Outpatient Medications   Medication Sig Dispense Refill     buPROPion (WELLBUTRIN XL) 150 MG 24 hr tablet TAKE 1 TABLET(150 MG) BY MOUTH EVERY MORNING 30 tablet 0      "fluticasone-vilanterol (BREO ELLIPTA) 200-25 MCG/INH inhaler Inhale 1 puff into the lungs daily 1 each 3     ibuprofen (ADVIL,MOTRIN) 800 MG tablet Take 1 tablet (800 mg) by mouth every 8 hours as needed for moderate pain 30 tablet 0     PARoxetine (PAXIL) 20 MG tablet TAKE 1 TABLET(20 MG) BY MOUTH EVERY MORNING 30 tablet 0     ipratropium-albuterol (COMBIVENT RESPIMAT)  MCG/ACT inhaler Inhale 1 puff into the lungs 4 times daily (Patient not taking: Reported on 8/27/2021) 4 g 3     ketoconazole (NIZORAL) 2 % external shampoo Apply topically daily as needed for itching or irritation (use for washing the hair twice a week) (Patient not taking: Reported on 8/27/2021) 120 mL 1         Family History:  - No family history of sudden cardiac death, premature CAD, valvular disorders  Family History   Problem Relation Age of Onset     Thyroid Disease Mother         removed     Cancer Paternal Grandmother         stomach cancer     Alzheimer Disease Paternal Grandmother      Depression Sister      Thyroid Disease Sister      Thyroid Disease Sister         on meds     Diabetes Brother      Depression Brother      Diabetes Nephew        Social History:  Social History     Tobacco Use     Smoking status: Never Smoker     Smokeless tobacco: Never Used   Substance Use Topics     Alcohol use: Not Currently     Comment: 1-2 beers 1-2Xs/mo       Allergies   Allergen Reactions     Meloxicam      Tongue swelling         Physical Examination:  Vitals: /79 (BP Location: Right arm, Patient Position: Chair, Cuff Size: Adult Regular)   Pulse 81   Ht 1.753 m (5' 9\")   Wt 81.6 kg (180 lb)   SpO2 98%   BMI 26.58 kg/m    BMI= Body mass index is 26.58 kg/m .    Orthostatics:   Lying supine for three minutes: 132/81, HR 73  Sitting (immediate): 130/78, HR 77  Standing (immediate): 129/83, HR 78  Standing 1 min: 121/79, HR 78  Standing 3 min: 126/82, HR 77     GENERAL: Healthy, alert and no distress  Eyes: No xanthelasmas.  NECK: " No palpable neck masses/goitre and no evidence of retrosternal goitre.   ENT: Moist mucosal membranes.  RESPIRATORY: No signs of resp distress. Lungs CTAB.  CARDIOVASCULAR:  No JVD, regular, normal S1+S2 without added sounds or murmurs.  ABDOMEN: ND, soft, non-tender, normal bowel sounds.  EXTREMITIES: Warm, well-perfused, no edema.   NEUROLOGY: GCS 15/15, no focal deficits.  VASC: No carotid bruits. Carotid, radial, brachial, popliteal, dorsalis pedis and posterior tibialis pulses are normal in volumes and symmetric bilaterally.   SKIN: No ecchymoses, no rashes.  PSYCH: Cooperative, pleasant affect.       Investigations:  I personally viewed and interpreted the following investigations:  Labs:    CBC RESULTS:  Lab Results   Component Value Date    WBC 5.2 03/19/2021    RBC 4.56 03/19/2021    HGB 13.1 03/19/2021    HCT 39.5 03/19/2021    MCV 87 03/19/2021    MCH 28.7 03/19/2021    MCHC 33.2 03/19/2021    RDW 13.0 03/19/2021     03/19/2021       CMP RESULTS:  Lab Results   Component Value Date     03/19/2021    POTASSIUM 4.3 03/19/2021    CHLORIDE 109 03/19/2021    CO2 25 03/19/2021    ANIONGAP 4 03/19/2021     (H) 03/19/2021    BUN 10 03/19/2021    CR 1.03 03/19/2021    GFRESTIMATED 63 03/19/2021    GFRESTBLACK 73 03/19/2021    JENIFER 8.2 (L) 03/19/2021    BILITOTAL 0.6 03/19/2021    ALBUMIN 3.6 03/19/2021    ALKPHOS 72 03/19/2021    ALT 17 03/19/2021    AST 16 03/19/2021          Recent Tests:    Electrocardiogram (08/27/2021):  NSR, HR 79 nl axis, nl intervals, no ischemic changes.     Assessment and Plan:   Hailey Stark is a 51 year old female with a past medical history of COVID-19 who sought evaluation via self-referral for significant dyspnea on exertion, non-anginal chest pain, and fatigue.     Mrs. 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  However, she has not had any  investigations to evaluate for pre-existing cardiovascular disease. Given that she has a prior history of AVNRT and the additional burden of dyspnea, I feel that non-invasive investigations are warranted to rule out structural heart disease.  In view of this, I talked to her about the possible investigative options.  We resolved to obtain a echocardiogram to evaluate for myocardial and valvular disease.  Additionally, in view of her palpitations/chest pressure being similar to the symptoms that she had prior to her AVNRT ablation, I talked her about seeking an ambulatory ECG monitor for 14 days.      Ultimately, the therapy with the strongest evidence base behind it to treat long COVID-19 is pursuing physical rehabilitation. I talked her at length about the benefits of rehabilitation.  We also resolved to refer her to Physiatry to come up with a rehab plan.  This will help to improve her conditioning and, ideally, lead to an improvement in her significant dyspnea on exertion, fatigue, and tachycardia on exertion.    Problems:  1. History of COVID-19, diagnosed in 11/2020  2. Dyspnea on exertion  3. Palpitations and non-anginal chest pain  4. History of AVNRT status post ablation in 2000    Plan:  - Physiatry referral  - TTE  - Ziopatch x 14 days   - To decide when to RTC after test results    Chart review time: 25  Visit time: 30  Total time spent: 30  >50% of this 55-minute visit were spent with the patient on in-person counseling and discussion regarding dyspnea on exertion, fatigue.     Jared Yung [facility], [place], [place]    Cardiovascular Division  Pager [phone]    CC  Patient Care Team:  Adriano Layton MD as PCP - General  Fatoumata Rahman MD as MD (Internal Medicine)  Valeriy Ramirez PA-C as Referring Physician (Family Medicine)  Fatoumata Rahman MD as Assigned PCP  Riaz Keller MD as Assigned Pulmonology Provider

## 2021-08-27 NOTE — PROGRESS NOTES
Per Dr. Yung, patient to have 14-day Zio monitor placed.  Diagnosis: palpitations  Monitor placed: Yes  Patient Instructed: Yes  Patient verbalized understanding: Yes  Holter # [id number]    Monitor was placed by AMARI Najera.  4:59 PM

## 2021-08-27 NOTE — LETTER
8/27/2021      RE: Hailey Stark  [address]  [facility] [address]       Dear Colleague,    Thank you for the opportunity to participate in the care of your patient, Hailey Stark, at the [facility] [place] [facility]. Please see a copy of my visit note below.            Chief Complaint: Palpitations    HPI: Hailey Stark is a 51 year old female with a past medical history of AVNRT status post ablation (2000) who was referred to me for evaluation of palpitations by Dr. Fatoumata Rahman.     At baseline, Mrs. Stark is a highly active female who went to the gym several times per week. She was able to exercise seven miles on the [place] . She works as an LPN at [employer].     Mrs. Stark was in her usual state of health till October 2020, at which point she was exposed to COVID-19 at work. She developed mild symptoms and was subsequently diagnosed with COVID-19. Mrs. Stark was asked to quarantine for 14 days. She went back for 4 days but had to stop given that she developed chest pressure and exertional dyspnea.  Since then, she has been followed up in the [facility].  She was most recently seen by my Pulmonary colleague Dr. Riaz Keller in April 2021.  At that time, she was noted to have episodes of constant chest pressure and significant dyspnea on exertion. The dyspnea on exertion limited her to walking less than a 100 yards at a time and, at time, left her too dyspneic to talk. She still feels like she has 'brain fog'/  Dr. Keller and the patient decided that if his symptoms continue to present for two months from the time of the consultation, exercise stress echocardiogram would be sought. Mrs. Stark found that her symptoms did not improve, so she sought Cardiology evaluation.    At the time of the consultation, Mrs. Stark's chief complaint is the significant dyspnea on exertion that she experiences. Today, she stated  that she occasionally felt dyspneic while speaking. Mrs. Stark also notes that she has fatigue and constant, unremitting chest pressure that occasionally worsens when she walks around and is occasionally associated with palpitations. There is no radiation or relieving factors. This pain and palpitations are quite similar to what she had before her AVNRT ablation in 2000. Finally, she noted pulsations in her neck when exercising. She notes an absence of PND, orthopnea, peripheral edema, or syncope.      A comprehensive ROS was done and the details are included above in the HPI.    Past Medical History:  - AVNRT status post ablation in 2000  - No prior history of hypertension, T2DM, dyslipidemia, CAD, TIA/stroke, vascular aneurysms, PAD  Past Medical History:   Diagnosis Date     Anxiety state, unspecified     Anxiety     Cardiac dysrhythmia, unspecified     Arrhythmia NOS s/p ablation     Chronic peptic ulcer, unspecified site, without mention of hemorrhage, perforation, or obstruction     Ulcer, Peptic     Depressive disorder, not elsewhere classified     Depression (non-psychotic)     Leukorrhea, not specified as infective 11/18/2009    heavy, daily, yellow/green mucoid dischg following retained tampon removal.Tx w flagyl, metrogel, Cleocin, Diflucan, doxycycline, boric acid capsules. 5/16/2011 + GBS.      Menarche age 13    cycles q 30 x 5 d, heavy     Moderate dysplasia of cervix (KENNY II) 1998     Normal Papssince LEEP->routine screening       Past Surgical History:    Past Surgical History:   Procedure Laterality Date     CARDIAC SURGERY  2000    Catheter ablation     Cervical Conization Loop Electrode Excision  1998    KENNY II  F/U Pap q 3 mo x 2 yrs were all NORMAL     COSMETIC SURGERY  2005& 2006    Liposuction     EYE SURGERY  2006    Lasix     KIDNEY SURGERY  summer 2012    6 kidney stone excision/stent placed     ORTHOPEDIC SURGERY  2012    Bunionectomy     SURGICAL HISTORY OF -       lasik     SURGICAL  "HISTORY OF -       catheter ablation heart atrial fib tx     VASCULAR SURGERY  Feb. 2020    Adhesive ablation       Drug History:  Home cardiac meds: None.  Current Outpatient Medications   Medication Sig Dispense Refill     buPROPion (WELLBUTRIN XL) 150 MG 24 hr tablet TAKE 1 TABLET(150 MG) BY MOUTH EVERY MORNING 30 tablet 0     fluticasone-vilanterol (BREO ELLIPTA) 200-25 MCG/INH inhaler Inhale 1 puff into the lungs daily 1 each 3     ibuprofen (ADVIL,MOTRIN) 800 MG tablet Take 1 tablet (800 mg) by mouth every 8 hours as needed for moderate pain 30 tablet 0     PARoxetine (PAXIL) 20 MG tablet TAKE 1 TABLET(20 MG) BY MOUTH EVERY MORNING 30 tablet 0     ipratropium-albuterol (COMBIVENT RESPIMAT)  MCG/ACT inhaler Inhale 1 puff into the lungs 4 times daily (Patient not taking: Reported on 8/27/2021) 4 g 3     ketoconazole (NIZORAL) 2 % external shampoo Apply topically daily as needed for itching or irritation (use for washing the hair twice a week) (Patient not taking: Reported on 8/27/2021) 120 mL 1         Family History:  - No family history of sudden cardiac death, premature CAD, valvular disorders  Family History   Problem Relation Age of Onset     Thyroid Disease Mother         removed     Cancer Paternal Grandmother         stomach cancer     Alzheimer Disease Paternal Grandmother      Depression Sister      Thyroid Disease Sister      Thyroid Disease Sister         on meds     Diabetes Brother      Depression Brother      Diabetes Nephew        Social History:  Social History     Tobacco Use     Smoking status: Never Smoker     Smokeless tobacco: Never Used   Substance Use Topics     Alcohol use: Not Currently     Comment: 1-2 beers 1-2Xs/mo       Allergies   Allergen Reactions     Meloxicam      Tongue swelling         Physical Examination:  Vitals: /79 (BP Location: Right arm, Patient Position: Chair, Cuff Size: Adult Regular)   Pulse 81   Ht 1.753 m (5' 9\")   Wt 81.6 kg (180 lb)   SpO2 98%  "  BMI 26.58 kg/m    BMI= Body mass index is 26.58 kg/m .    Orthostatics:   Lying supine for three minutes: 132/81, HR 73  Sitting (immediate): 130/78, HR 77  Standing (immediate): 129/83, HR 78  Standing 1 min: 121/79, HR 78  Standing 3 min: 126/82, HR 77     GENERAL: Healthy, alert and no distress  Eyes: No xanthelasmas.  NECK: No palpable neck masses/goitre and no evidence of retrosternal goitre.   ENT: Moist mucosal membranes.  RESPIRATORY: No signs of resp distress. Lungs CTAB.  CARDIOVASCULAR:  No JVD, regular, normal S1+S2 without added sounds or murmurs.  ABDOMEN: ND, soft, non-tender, normal bowel sounds.  EXTREMITIES: Warm, well-perfused, no edema.   NEUROLOGY: GCS 15/15, no focal deficits.  VASC: No carotid bruits. Carotid, radial, brachial, popliteal, dorsalis pedis and posterior tibialis pulses are normal in volumes and symmetric bilaterally.   SKIN: No ecchymoses, no rashes.  PSYCH: Cooperative, pleasant affect.       Investigations:  I personally viewed and interpreted the following investigations:  Labs:    CBC RESULTS:  Lab Results   Component Value Date    WBC 5.2 03/19/2021    RBC 4.56 03/19/2021    HGB 13.1 03/19/2021    HCT 39.5 03/19/2021    MCV 87 03/19/2021    MCH 28.7 03/19/2021    MCHC 33.2 03/19/2021    RDW 13.0 03/19/2021     03/19/2021       CMP RESULTS:  Lab Results   Component Value Date     03/19/2021    POTASSIUM 4.3 03/19/2021    CHLORIDE 109 03/19/2021    CO2 25 03/19/2021    ANIONGAP 4 03/19/2021     (H) 03/19/2021    BUN 10 03/19/2021    CR 1.03 03/19/2021    GFRESTIMATED 63 03/19/2021    GFRESTBLACK 73 03/19/2021    JENIFER 8.2 (L) 03/19/2021    BILITOTAL 0.6 03/19/2021    ALBUMIN 3.6 03/19/2021    ALKPHOS 72 03/19/2021    ALT 17 03/19/2021    AST 16 03/19/2021          Recent Tests:    Electrocardiogram (08/27/2021):  NSR, HR 79 nl axis, nl intervals, no ischemic changes.     Assessment and Plan:   Hailey Stark is a 51 year old female with a past medical history  of COVID-19 who sought evaluation via self-referral for significant dyspnea on exertion, non-anginal chest pain, and fatigue.     Mrs. 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  However, she has not had any investigations to evaluate for pre-existing cardiovascular disease. Given that she has a prior history of AVNRT and the additional burden of dyspnea, I feel that non-invasive investigations are warranted to rule out structural heart disease.  In view of this, I talked to her about the possible investigative options.  We resolved to obtain a echocardiogram to evaluate for myocardial and valvular disease.  Additionally, in view of her palpitations/chest pressure being similar to the symptoms that she had prior to her AVNRT ablation, I talked her about seeking an ambulatory ECG monitor for 14 days.      Ultimately, the therapy with the strongest evidence base behind it to treat long COVID-19 is pursuing physical rehabilitation. I talked her at length about the benefits of rehabilitation.  We also resolved to refer her to Physiatry to come up with a rehab plan.  This will help to improve her conditioning and, ideally, lead to an improvement in her significant dyspnea on exertion, fatigue, and tachycardia on exertion.    Problems:  1. History of COVID-19, diagnosed in 11/2020  2. Dyspnea on exertion  3. Palpitations and non-anginal chest pain  4. History of AVNRT status post ablation in 2000    Plan:  - Physiatry referral  - TTE  - Ziopatch x 14 days   - To decide when to RTC after test results    Chart review time: 25  Visit time: 30  Total time spent: 30  >50% of this 55-minute visit were spent with the patient on in-person counseling and discussion regarding dyspnea on exertion, fatigue.     Beth Zapata, MS    Cardiovascular Division  Pager [phone]    CC  Patient Care  Team:  Adriano Layton MD as PCP - General  [facility] Center[facility]oumata MD as MD (Internal Medicine)  Valeriy Ramirez PA-C as Referring Physician (Family Medicine)  Riaz Keller MD as Assigned Pulmonology Provider

## 2021-08-28 LAB
ATRIAL RATE - MUSE: 79 BPM
DIASTOLIC BLOOD PRESSURE - MUSE: NORMAL MMHG
INTERPRETATION ECG - MUSE: NORMAL
P AXIS - MUSE: 56 DEGREES
PR INTERVAL - MUSE: 148 MS
QRS DURATION - MUSE: 86 MS
QT - MUSE: 370 MS
QTC - MUSE: 424 MS
R AXIS - MUSE: 0 DEGREES
SYSTOLIC BLOOD PRESSURE - MUSE: NORMAL MMHG
T AXIS - MUSE: 24 DEGREES
VENTRICULAR RATE- MUSE: 79 BPM

## 2021-09-03 ENCOUNTER — ANCILLARY PROCEDURE (OUTPATIENT)
Dept: CARDIOLOGY | Facility: CLINIC | Age: 51
End: 2021-09-03
Attending: INTERNAL MEDICINE
Payer: OTHER MISCELLANEOUS

## 2021-09-03 DIAGNOSIS — R06.09 DYSPNEA ON EXERTION: ICD-10-CM

## 2021-09-03 LAB — LVEF ECHO: NORMAL

## 2021-09-03 PROCEDURE — 93306 TTE W/DOPPLER COMPLETE: CPT | Performed by: INTERNAL MEDICINE

## 2021-09-12 ENCOUNTER — HEALTH MAINTENANCE LETTER (OUTPATIENT)
Age: 51
End: 2021-09-12

## 2021-09-22 DIAGNOSIS — F32.0 MILD MAJOR DEPRESSION (H): ICD-10-CM

## 2021-09-22 DIAGNOSIS — F41.1 ANXIETY STATE: ICD-10-CM

## 2021-09-23 DIAGNOSIS — F32.0 MILD MAJOR DEPRESSION (H): ICD-10-CM

## 2021-09-23 DIAGNOSIS — F41.1 ANXIETY STATE: ICD-10-CM

## 2021-09-23 NOTE — TELEPHONE ENCOUNTER
Routing refill request to provider for review/approval because:  A break in medication  Patient needs to be seen because it has been more than 1 year since last office visit.    Last filled:  PARoxetine (PAXIL) 20 MG tablet 30 tablet 0 8/20/2020       Last visit: 9/4/2020    TC: please call and schedule yearly visit    SSRIs Protocol [id number] 07:27 AM   Recent (6 mo) or future (30 days) visit within the authorizing provider's specialty Protocol Details    PHQ-9 score less than 5 in past 6 months     Medication is active on med list     Patient is age 18 or older     No active pregnancy on record     No positive pregnancy test in last 12 months

## 2021-09-24 RX ORDER — BUPROPION HYDROCHLORIDE 150 MG/1
TABLET ORAL
Qty: 90 TABLET | Refills: 0 | Status: SHIPPED | OUTPATIENT
Start: 2021-09-24 | End: 2021-12-24

## 2021-09-24 RX ORDER — PAROXETINE 20 MG/1
TABLET, FILM COATED ORAL
Qty: 90 TABLET | Refills: 0 | Status: SHIPPED | OUTPATIENT
Start: 2021-09-24 | End: 2021-12-23

## 2021-09-24 NOTE — TELEPHONE ENCOUNTER
Routing refill request to provider for review/approval because:  Patient needs to be seen because it has been more than 1 year since last office visit.  Different provider signed last medication order.    TCs were asked to call in 9/22/21 refill encounter.     JOSUE MontesN RN  [facility]

## 2021-11-26 ENCOUNTER — MYC MEDICAL ADVICE (OUTPATIENT)
Dept: FAMILY MEDICINE | Facility: CLINIC | Age: 51
End: 2021-11-26
Payer: OTHER MISCELLANEOUS

## 2021-11-26 DIAGNOSIS — Z01.00 VISIT FOR EYE AND VISION EXAM: Primary | ICD-10-CM

## 2021-11-30 NOTE — TELEPHONE ENCOUNTER
Valeriy: would you recommend a virtual visit to discuss this?    This is Hailey humphrey. I recently saw an add for new prescription eyedrop for blurry vision in adults. I wear readers to read small print. My last eye exam a year ago said I don't need prescription lenses yet. The eyedrop is called Vuity.    Sharifa Francois RN  [facility]

## 2021-12-01 NOTE — TELEPHONE ENCOUNTER
Writer responded via [employer].    JOSUE TranN, RN  [facility]

## 2021-12-01 NOTE — TELEPHONE ENCOUNTER
Valeriy Ramirez PA-C  Hp Grand Triage 3 hours ago (1:20 PM)     EW    I would defer a prescription for eye drops of this nature to Hailey's eye care provider. If she needs a referral to ophthalmology let me know. Thanks!      Writer responded via [employer].    JOSUE TranN, RN  [facility]

## 2021-12-10 ENCOUNTER — OFFICE VISIT (OUTPATIENT)
Dept: OPHTHALMOLOGY | Facility: CLINIC | Age: 51
End: 2021-12-10
Attending: PHYSICIAN ASSISTANT
Payer: COMMERCIAL

## 2021-12-10 ENCOUNTER — TELEPHONE (OUTPATIENT)
Dept: PULMONOLOGY | Facility: CLINIC | Age: 51
End: 2021-12-10

## 2021-12-10 DIAGNOSIS — H52.4 PRESBYOPIA: Primary | ICD-10-CM

## 2021-12-10 DIAGNOSIS — Z01.00 VISIT FOR EYE AND VISION EXAM: ICD-10-CM

## 2021-12-10 PROCEDURE — 92004 COMPRE OPH EXAM NEW PT 1/>: CPT | Performed by: OPTOMETRIST

## 2021-12-10 PROCEDURE — G0463 HOSPITAL OUTPT CLINIC VISIT: HCPCS

## 2021-12-10 ASSESSMENT — TONOMETRY
OD_IOP_MMHG: 12
OS_IOP_MMHG: 09
IOP_METHOD: TONOPEN

## 2021-12-10 ASSESSMENT — CONF VISUAL FIELD
OS_NORMAL: 1
METHOD: COUNTING FINGERS
OD_NORMAL: 1

## 2021-12-10 ASSESSMENT — CUP TO DISC RATIO
OS_RATIO: 0.2
OD_RATIO: 0.25

## 2021-12-10 ASSESSMENT — EXTERNAL EXAM - LEFT EYE: OS_EXAM: NORMAL

## 2021-12-10 ASSESSMENT — VISUAL ACUITY
OD_SC: 20/20
METHOD: SNELLEN - LINEAR
OS_SC: 20/20
METHOD_MR: DEFERRED

## 2021-12-10 ASSESSMENT — SLIT LAMP EXAM - LIDS
COMMENTS: NORMAL
COMMENTS: NORMAL

## 2021-12-10 ASSESSMENT — EXTERNAL EXAM - RIGHT EYE: OD_EXAM: NORMAL

## 2021-12-10 NOTE — NURSING NOTE
Chief Complaints and History of Present Illnesses   Patient presents with     Annual Eye Exam     Chief Complaint(s) and History of Present Illness(es)     Annual Eye Exam     Laterality: both eyes    Course: stable    Associated symptoms: Negative for floaters, flashes, eye pain and headache    Treatments tried: no treatments    Pain scale: 0/10              Comments     Pt would like to try a new drop called Vuity  Pt wears OTC readers , Pt had lasik 15 years ago     Talia Moon COT 12:50 PM December 10, 2021

## 2021-12-10 NOTE — PROGRESS NOTES
Assessment & Plan       Hailey Stark is a 51 year old female with the following diagnoses:   1. Presbyopia - Both Eyes    2. Visit for eye and vision exam - Both Eyes       Pt would like new drop   Vuity (1.25%Dionicio) [facility] will provide   One drop every day each eye   Went over side effects         Patient disposition:   No follow-ups on file.          Complete documentation of historical and exam elements from today's encounter can be found in the full encounter summary report (not reduplicated in this progress note). I personally obtained the chief complaint(s) and history of present illness.  I confirmed and edited as necessary the review of systems, past medical/surgical history, family history, social history, and examination findings as documented by others; and I examined the patient myself. I personally reviewed the relevant tests, images, and reports as documented above. I formulated and edited as necessary the assessment and plan and discussed the findings and management plan with the patient and family.  Dr. Dimas Ryan            Pt would like new drop   Vuity (1.25%Dionicio)  One drop every day each eye   Went over side effects

## 2021-12-10 NOTE — TELEPHONE ENCOUNTER
LVM with direct call back to discuss rescheduling their appointment on 1/24 with Dr. gerard as provider is no longer available. Requested call back to discuss.

## 2021-12-22 ENCOUNTER — TELEPHONE (OUTPATIENT)
Dept: PULMONOLOGY | Facility: CLINIC | Age: 51
End: 2021-12-22
Payer: COMMERCIAL

## 2021-12-22 NOTE — TELEPHONE ENCOUNTER
LVM again with direct call back to discuss rescheduling their appt on 1/24 with Dr. gerard as provider is unavailable. As this is second attempt, will also send [employer] message detailing same information as they have active [employer] account.

## 2021-12-23 DIAGNOSIS — F41.1 ANXIETY STATE: ICD-10-CM

## 2021-12-23 DIAGNOSIS — F32.0 MILD MAJOR DEPRESSION (H): ICD-10-CM

## 2021-12-23 NOTE — TELEPHONE ENCOUNTER
Routing refill request to provider for review/approval because:  Stefany given x1 and patient did not follow up, please advise  Patient needs to be seen because it has been more than 1 year since last office visit.    Team Coordinators: Please contact patient to set up annual physical for any further refills.     JOSUE MontesN RN  [facility]

## 2021-12-24 DIAGNOSIS — F32.0 MILD MAJOR DEPRESSION (H): ICD-10-CM

## 2021-12-24 DIAGNOSIS — F41.1 ANXIETY STATE: ICD-10-CM

## 2021-12-24 RX ORDER — BUPROPION HYDROCHLORIDE 150 MG/1
TABLET ORAL
Qty: 90 TABLET | Refills: 0 | Status: SHIPPED | OUTPATIENT
Start: 2021-12-24 | End: 2022-01-31

## 2021-12-24 NOTE — TELEPHONE ENCOUNTER
Routing refill request to provider for review/approval because:   PHQ-9 score less than 5 in past 6 months    Recent (6 mo) or future (30 days) visit within the authorizing provider's specialty     PHQ-9 score:    PHQ 4/28/2021   PHQ-9 Total Score 2   Q9: Thoughts of better off dead/self-harm past 2 weeks Not at all     Last Written Prescription Date:  9/24/21  Last Fill Quantity: 90,  # refills: 0   Last office visit: virtual with Kaybuso 7/10/20  Future Office Visit:      Scheduling: please reach out to patient.  She may have established care at [facility]. Otherwise needs an annual.    Sharifa Francois RN  [facility]

## 2021-12-28 ENCOUNTER — TELEPHONE (OUTPATIENT)
Dept: PULMONOLOGY | Facility: CLINIC | Age: 51
End: 2021-12-28
Payer: COMMERCIAL

## 2021-12-28 NOTE — TELEPHONE ENCOUNTER
Pt returned call regarding rescheduling appt with Dr. Keller on 1/24 as provider is not available. Opening on 2/17 and pt was agreeable to that appt. Details confirmed with pt

## 2022-01-02 ENCOUNTER — HEALTH MAINTENANCE LETTER (OUTPATIENT)
Age: 52
End: 2022-01-02

## 2022-01-07 DIAGNOSIS — R06.09 DYSPNEA ON EXERTION: Primary | ICD-10-CM

## 2022-01-11 ENCOUNTER — TELEPHONE (OUTPATIENT)
Dept: OTOLARYNGOLOGY | Facility: CLINIC | Age: 52
End: 2022-01-11
Payer: COMMERCIAL

## 2022-01-11 NOTE — TELEPHONE ENCOUNTER
"M offering next available appointment with a Laryngologist.        Dr. Dominguez: [facility]        Dr. Berkowitz: [facility] Throat - Dr. Berkowitz is conducting \"hybrid\" visits. The first half of the appt the pt will be in the exam room and Dr. Berkowitz will video conference in. The 2nd half of the appt provider will be in the room for physical examination.       Dr. Wheeler: [facility]  Throat      Providers are likely booked out. Add to wait list as desired.      Left call center number for scheduling.  "

## 2022-01-13 NOTE — TELEPHONE ENCOUNTER
FUTURE VISIT INFORMATION      FUTURE VISIT INFORMATION:    Date: 4/19/22    Time: 8AM    Location: [facility]  REFERRAL INFORMATION:    Referring provider:  Riaz Keller MD    Referring providers clinic:  [facility] [place]     Reason for visit/diagnosis  Per Pt, dx Dyspnea on exertion [R06.00] referral from Riaz Keller MD, okay to schedule per Clinic notes in epic    RECORDS REQUESTED FROM:       Clinic name Comments Records Status Imaging Status   Imaging 1/21/22 CT Chest  4/29/21 XR Chest    [facility] PACS   [facility]  4/29/21 note from Riaz Keller MD [facility]

## 2022-01-17 RX ORDER — PAROXETINE 20 MG/1
TABLET, FILM COATED ORAL
Qty: 30 TABLET | Refills: 0 | Status: SHIPPED | OUTPATIENT
Start: 2022-01-17 | End: 2022-01-31

## 2022-01-21 ENCOUNTER — ANCILLARY PROCEDURE (OUTPATIENT)
Dept: CT IMAGING | Facility: CLINIC | Age: 52
End: 2022-01-21
Attending: INTERNAL MEDICINE
Payer: OTHER MISCELLANEOUS

## 2022-01-21 DIAGNOSIS — R06.09 DYSPNEA ON EXERTION: ICD-10-CM

## 2022-01-21 PROCEDURE — 71250 CT THORAX DX C-: CPT | Mod: GC | Performed by: RADIOLOGY

## 2022-01-24 ASSESSMENT — ENCOUNTER SYMPTOMS
BREAST MASS: 0
FEVER: 0
HEARTBURN: 0
NAUSEA: 0
PARESTHESIAS: 0
NERVOUS/ANXIOUS: 0
COUGH: 0
DIARRHEA: 0
SORE THROAT: 0
CHILLS: 0
JOINT SWELLING: 0
DIZZINESS: 0
ARTHRALGIAS: 0
ABDOMINAL PAIN: 0
FREQUENCY: 0
DYSURIA: 0
PALPITATIONS: 0
WEAKNESS: 0
CONSTIPATION: 0
HEMATOCHEZIA: 0
HEADACHES: 0
SHORTNESS OF BREATH: 1
HEMATURIA: 0
EYE PAIN: 0
MYALGIAS: 0

## 2022-01-31 ENCOUNTER — OFFICE VISIT (OUTPATIENT)
Dept: FAMILY MEDICINE | Facility: CLINIC | Age: 52
End: 2022-01-31
Payer: COMMERCIAL

## 2022-01-31 VITALS
BODY MASS INDEX: 27.57 KG/M2 | SYSTOLIC BLOOD PRESSURE: 122 MMHG | OXYGEN SATURATION: 99 % | HEIGHT: 69 IN | DIASTOLIC BLOOD PRESSURE: 76 MMHG | TEMPERATURE: 97.7 F | HEART RATE: 76 BPM | WEIGHT: 186.12 LBS

## 2022-01-31 DIAGNOSIS — R73.03 PREDIABETES: ICD-10-CM

## 2022-01-31 DIAGNOSIS — E78.5 HYPERLIPIDEMIA WITH TARGET LDL LESS THAN 100: ICD-10-CM

## 2022-01-31 DIAGNOSIS — E03.9 ACQUIRED HYPOTHYROIDISM: ICD-10-CM

## 2022-01-31 DIAGNOSIS — R06.02 SHORTNESS OF BREATH: ICD-10-CM

## 2022-01-31 DIAGNOSIS — F41.1 ANXIETY STATE: ICD-10-CM

## 2022-01-31 DIAGNOSIS — F32.0 MILD MAJOR DEPRESSION (H): Primary | ICD-10-CM

## 2022-01-31 DIAGNOSIS — R79.89 ELEVATED TSH: ICD-10-CM

## 2022-01-31 LAB
ERYTHROCYTE [DISTWIDTH] IN BLOOD BY AUTOMATED COUNT: 13.5 % (ref 10–15)
HBA1C MFR BLD: 5.9 % (ref 0–5.6)
HCT VFR BLD AUTO: 39.7 % (ref 35–47)
HGB BLD-MCNC: 12.9 G/DL (ref 11.7–15.7)
MCH RBC QN AUTO: 27.6 PG (ref 26.5–33)
MCHC RBC AUTO-ENTMCNC: 32.5 G/DL (ref 31.5–36.5)
MCV RBC AUTO: 85 FL (ref 78–100)
PLATELET # BLD AUTO: 347 10E3/UL (ref 150–450)
RBC # BLD AUTO: 4.67 10E6/UL (ref 3.8–5.2)
WBC # BLD AUTO: 7.4 10E3/UL (ref 4–11)

## 2022-01-31 PROCEDURE — 84443 ASSAY THYROID STIM HORMONE: CPT | Performed by: NURSE PRACTITIONER

## 2022-01-31 PROCEDURE — 84439 ASSAY OF FREE THYROXINE: CPT | Performed by: NURSE PRACTITIONER

## 2022-01-31 PROCEDURE — 80053 COMPREHEN METABOLIC PANEL: CPT | Performed by: NURSE PRACTITIONER

## 2022-01-31 PROCEDURE — 99214 OFFICE O/P EST MOD 30 MIN: CPT | Performed by: NURSE PRACTITIONER

## 2022-01-31 PROCEDURE — 36415 COLL VENOUS BLD VENIPUNCTURE: CPT | Performed by: NURSE PRACTITIONER

## 2022-01-31 PROCEDURE — 85027 COMPLETE CBC AUTOMATED: CPT | Performed by: NURSE PRACTITIONER

## 2022-01-31 PROCEDURE — 80061 LIPID PANEL: CPT | Performed by: NURSE PRACTITIONER

## 2022-01-31 PROCEDURE — 83036 HEMOGLOBIN GLYCOSYLATED A1C: CPT | Performed by: NURSE PRACTITIONER

## 2022-01-31 RX ORDER — BUPROPION HYDROCHLORIDE 150 MG/1
150 TABLET ORAL EVERY MORNING
Qty: 90 TABLET | Refills: 3 | Status: SHIPPED | OUTPATIENT
Start: 2022-01-31 | End: 2023-01-31

## 2022-01-31 RX ORDER — ALBUTEROL SULFATE 90 UG/1
2 AEROSOL, METERED RESPIRATORY (INHALATION) EVERY 6 HOURS
Qty: 18 G | Refills: 1 | Status: SHIPPED | OUTPATIENT
Start: 2022-01-31 | End: 2024-07-25

## 2022-01-31 RX ORDER — PAROXETINE 20 MG/1
20 TABLET, FILM COATED ORAL EVERY MORNING
Qty: 30 TABLET | Refills: 1 | Status: SHIPPED | OUTPATIENT
Start: 2022-01-31 | End: 2022-05-24

## 2022-01-31 ASSESSMENT — ENCOUNTER SYMPTOMS
MYALGIAS: 0
EYE PAIN: 0
HEMATURIA: 0
ARTHRALGIAS: 0
HEADACHES: 0
NERVOUS/ANXIOUS: 0
HEARTBURN: 0
PALPITATIONS: 0
PARESTHESIAS: 0
DYSURIA: 0
CONSTIPATION: 0
DIARRHEA: 0
SORE THROAT: 0
WEAKNESS: 0
BREAST MASS: 0
FEVER: 0
HEMATOCHEZIA: 0
COUGH: 0
ABDOMINAL PAIN: 0
DIZZINESS: 0
CHILLS: 0
NAUSEA: 0
JOINT SWELLING: 0
FREQUENCY: 0
SHORTNESS OF BREATH: 1

## 2022-01-31 ASSESSMENT — MIFFLIN-ST. JEOR: SCORE: 1531.78

## 2022-01-31 NOTE — PROGRESS NOTES
"   SUBJECTIVE:   CC: Hailey Stark is an 51 year old woman who presents for preventive health visit. Patient scheduled for preventive health exam given time spend on additional chief complaint of shortness of breath, fatigue, chest pressure preventive exam was deferred.    51 yr old female with PMH Depression, Anxiety with c/o shortness of breath, chest pain and fatigue r/t COVID. Patient had an symptomatic positive COVID test in [id number]; became symptomatic on October 2 with headache and wheezing; was out of work until October 12; worked on COVID floor as a LPN at  [employer]. She worked until  October 16 when she developed chest pressure, sob and fatigue was out of work due to symptoms until 12-16. She has had full pulmonology workup with CT Lung wnl, PFT wnl except intrathoracic upper airway obstruction, pulmonologist started on Breo and Combivent for RADs; she continued therapy for short period due to cost of inhalers; notes mild improvement in symptoms with inhaler. Cardiac workup completed including EKG, Echo, Holter monitor and cardiology visit all wnl.   Today reports no improvement in symptoms feels exhausted often, no changes in dyspnea, reports sob with speaking, ongoing chest pain similar symptoms w/o changes no radiating pain, diaphoresis. Feels depression/anxiety is stable not related to current condition.       Pulm note 4/2021  \"Ordered an echo for assessment of cardiac function.  Prescribed Breo inhaler and as needed Combivent for reactive airway disease\"    PFT 4/2021  The FVC, FEV1, FEV1/FVC ratio and [id number]% are within normal limits.  The inspiratory flow rates are within normal limits.  Lung volumes are within normal limits.  The diffusing capacity is normal.  However, the diffusing capacity was not corrected for   the patient's hemoglobin.   IMPRESSION:   Normal spirometry.   Normal lung volumes.   Normal diffusing capacity.   Flattening of the expiratory limb of the flow volume loop " suggests intrathoracic upper airway obstruction.  Clinical correlation is recommended.    ECHO 9/2021  Interpretation Summary  Global and regional left ventricular function is normal with an EF of 55-60%.Global right ventricular function is normal.  Diastolic Doppler findings (E/E' ratio and/or other parameters) suggest left ventricular filling pressures are normal.  The inferior vena cava was normal in size with preserved respiratory  Variability. No significant valvular abnormalities were noted.   This study was compared with the study from 2009 .There has been no change.   Dyspnea unlikely cardiac based on this study.    Patient has been advised of split billing requirements and indicates understanding: Yes  Healthy Habits:     Getting at least 3 servings of Calcium per day:  Yes    Bi-annual eye exam:  Yes    Dental care twice a year:  Yes    Sleep apnea or symptoms of sleep apnea:  Daytime drowsiness and Excessive snoring    Diet:  Vegetarian/vegan    Frequency of exercise:  None    Taking medications regularly:  Yes    Medication side effects:  None    PHQ-2 Total Score: 0    Additional concerns today:  No    Today's PHQ-2 Score:   PHQ-2 ( 1999 Pfizer) 1/24/2022   Q1: Little interest or pleasure in doing things 0   Q2: Feeling down, depressed or hopeless 0   PHQ-2 Score 0   Q1: Little interest or pleasure in doing things Not at all   Q2: Feeling down, depressed or hopeless Not at all   PHQ-2 Score 0       Abuse: Current or Past (Physical, Sexual or Emotional) - No  Do you feel safe in your environment? Yes    Have you ever done Advance Care Planning? (For example, a Health Directive, POLST, or a discussion with a medical provider or your loved ones about your wishes): No, advance care planning information given to patient to review.  Patient declined advance care planning discussion at this time.    Social History     Tobacco Use     Smoking status: Never Smoker     Smokeless tobacco: Never Used   Substance  "Use Topics     Alcohol use: Not Currently     Comment: 1-2 beers 1-2Xs/mo     If you drink alcohol do you typically have >3 drinks per day or >7 drinks per week? No    Alcohol Use 1/31/2022   Prescreen: >3 drinks/day or >7 drinks/week? -   Prescreen: >3 drinks/day or >7 drinks/week? No       Reviewed orders with patient.  Reviewed health maintenance and updated orders accordingly - No  Labs reviewed in EPIC    Pertinent mammograms are reviewed under the imaging tab.    History of abnormal Pap smear: NO - age 30-65 PAP every 5 years with negative HPV co-testing recommended  PAP / HPV 4/22/2013 4/7/2010   PAP (Historical) NIL NIL     Reviewed and updated as needed this visit by clinical staff  Tobacco  Allergies  Meds  Problems  Med Hx  Surg Hx  Fam Hx  Soc Hx       Reviewed and updated as needed this visit by Provider     Review of Systems   Constitutional: Negative for chills and fever.   HENT: Negative for congestion, ear pain, hearing loss and sore throat.    Eyes: Negative for pain and visual disturbance.   Respiratory: Positive for shortness of breath. Negative for cough.    Cardiovascular: Negative for chest pain, palpitations and peripheral edema.   Gastrointestinal: Negative for abdominal pain, constipation, diarrhea, heartburn, hematochezia and nausea.   Breasts:  Negative for tenderness, breast mass and discharge.   Genitourinary: Negative for dysuria, frequency, genital sores, hematuria, pelvic pain, urgency, vaginal bleeding and vaginal discharge.   Musculoskeletal: Negative for arthralgias, joint swelling and myalgias.   Skin: Negative for rash.   Neurological: Negative for dizziness, weakness, headaches and paresthesias.   Psychiatric/Behavioral: The patient is not nervous/anxious.      OBJECTIVE:   /76 (BP Location: Left arm, Patient Position: Sitting, Cuff Size: Adult Regular)   Pulse 76   Temp 97.7  F (36.5  C) (Temporal)   Ht 1.758 m (5' 9.2\")   Wt 84.4 kg (186 lb 1.9 oz)   LMP " 01/21/2022 (Exact Date)   SpO2 99%   Breastfeeding No   BMI 27.33 kg/m       Physical Exam  GENERAL: alert and fatigued  HENT: ear canals and TM's normal, nose and mouth without ulcers or lesions  NECK: no adenopathy, no asymmetry, masses, or scars and thyroid normal to palpation  RESP: lungs clear to auscultation - no rales, rhonchi or wheezes  BREAST: deferred  CV: regular rate and rhythm, normal S1 S2, no S3 or S4, no murmur, click or rub, no peripheral edema and peripheral pulses strong  ABDOMEN: soft, nontender, no hepatosplenomegaly, no masses and bowel sounds normal   (female): deferred  MS: no gross musculoskeletal defects noted, no edema  SKIN: no suspicious lesions or rashes  NEURO: Normal strength and tone, mentation intact and speech normal  PSYCH: anxious and fatigued    Diagnostic Test Results:  Labs reviewed in Epic  Results for orders placed or performed in visit on 01/31/22   GLUCOSE     Status: None   Result Value Ref Range    Glucose 91 70 - 99 mg/dL    Patient Fasting > 8hrs? No    Hemoglobin A1c     Status: Abnormal   Result Value Ref Range    Hemoglobin A1C 5.9 (H) 0.0 - 5.6 %   CBC with platelets     Status: Normal   Result Value Ref Range    WBC Count 7.4 4.0 - 11.0 10e3/uL    RBC Count 4.67 3.80 - 5.20 10e6/uL    Hemoglobin 12.9 11.7 - 15.7 g/dL    Hematocrit 39.7 35.0 - 47.0 %    MCV 85 78 - 100 fL    MCH 27.6 26.5 - 33.0 pg    MCHC 32.5 31.5 - 36.5 g/dL    RDW 13.5 10.0 - 15.0 %    Platelet Count 347 150 - 450 10e3/uL   Comprehensive metabolic panel     Status: Normal   Result Value Ref Range    Sodium 138 133 - 144 mmol/L    Potassium 4.4 3.4 - 5.3 mmol/L    Chloride 106 94 - 109 mmol/L    Carbon Dioxide (CO2) 26 20 - 32 mmol/L    Anion Gap 6 3 - 14 mmol/L    Urea Nitrogen 10 7 - 30 mg/dL    Creatinine 1.02 0.52 - 1.04 mg/dL    Calcium 8.7 8.5 - 10.1 mg/dL    Glucose 91 70 - 99 mg/dL    Alkaline Phosphatase 82 40 - 150 U/L    AST 15 0 - 45 U/L    ALT 22 0 - 50 U/L    Protein Total  7.6 6.8 - 8.8 g/dL    Albumin 3.7 3.4 - 5.0 g/dL    Bilirubin Total 0.4 0.2 - 1.3 mg/dL    GFR Estimate 67 >60 mL/min/1.73m2   Lipid panel reflex to direct LDL Fasting     Status: Abnormal   Result Value Ref Range    Cholesterol 233 (H) <200 mg/dL    Triglycerides 154 (H) <150 mg/dL    Direct Measure HDL 59 >=50 mg/dL    LDL Cholesterol Calculated 143 (H) <=100 mg/dL    Non HDL Cholesterol 174 (H) <130 mg/dL    Patient Fasting > 8hrs? No     Narrative    Cholesterol  Desirable:  <200 mg/dL    Triglycerides  Normal:  Less than 150 mg/dL  Borderline High:  150-199 mg/dL  High:  200-499 mg/dL  Very High:  Greater than or equal to 500 mg/dL    Direct Measure HDL  Female:  Greater than or equal to 50 mg/dL   Male:  Greater than or equal to 40 mg/dL    LDL Cholesterol  Desirable:  <100mg/dL  Above Desirable:  100-129 mg/dL   Borderline High:  130-159 mg/dL   High:  160-189 mg/dL   Very High:  >= 190 mg/dL    Non HDL Cholesterol  Desirable:  130 mg/dL  Above Desirable:  130-159 mg/dL  Borderline High:  160-189 mg/dL  High:  190-219 mg/dL  Very High:  Greater than or equal to 220 mg/dL   TSH with free T4 reflex     Status: Abnormal   Result Value Ref Range    TSH 5.64 (H) 0.40 - 4.00 mU/L   T4 free     Status: Normal   Result Value Ref Range    Free T4 1.10 0.76 - 1.46 ng/dL     EKG - unremarkable    ASSESSMENT/PLAN:     # (R06.02) Shortness of breath   cardiology, pulmonology and ENT workup complete; all result wnl  Plan:   - Adult Post Covid Clinic Referral,   - SARS-CoV-2 Nucleocapsid Total  Ab, T4 free  -  fluticasone-salmeterol (ADVAIR) 250-50   - albuterol (PROAIR HFA/PROVENTIL HFA/VENTOLIN HFA) 108 (90 Base)   -   CBC with platelets,  Comprehensive metabolic panel, TSH with free T4       # (F32.0) Mild major depression (H)  # (F41.1) Anxiety state  stable; PHQ-2 score 2  Plan:   - refill buPROPion (WELLBUTRIN XL) 150 MG 24 hr tablet,   - refill  PARoxetine (PAXIL) 20 MG tablet    # (R79.89) Elevated TSH   subclinical  "hypothyroidism; TSH 5.64 today  Plan:   - f/u TSH in 1 month     # (E78.5) Hyperlipidemia with target LDL less than 100  Elevated   Plan:   - f/u Lipid panel in 6 months         # (R73.03) Prediabetes  A1C 5.9  Plan: lifestyle changes   - repeat Hemoglobin A1c in 6 months    Estimated body mass index is 27.33 kg/m  as calculated from the following:    Height as of this encounter: 1.758 m (5' 9.2\").    Weight as of this encounter: 84.4 kg (186 lb 1.9 oz).    Weight management plan: deferred d/t dyspnea     Follow up in 1 month for preventive health exam      50 minutes spent on the date of the encounter doing chart review, history and exam, documentation and further activities per the note    Counseling Resources:  ATP IV Guidelines  Pooled Cohorts Equation Calculator  Breast Cancer Risk Calculator  BRCA-Related Cancer Risk Assessment: FHS-7 Tool  FRAX Risk Assessment  ICSI Preventive Guidelines  Dietary Guidelines for Americans, 2010  USDA's MyPlate  ASA Prophylaxis  Lung CA Screening    MELODIE Houser [facility]  "

## 2022-02-01 LAB
ALBUMIN SERPL-MCNC: 3.7 G/DL (ref 3.4–5)
ALP SERPL-CCNC: 82 U/L (ref 40–150)
ALT SERPL W P-5'-P-CCNC: 22 U/L (ref 0–50)
ANION GAP SERPL CALCULATED.3IONS-SCNC: 6 MMOL/L (ref 3–14)
AST SERPL W P-5'-P-CCNC: 15 U/L (ref 0–45)
BILIRUB SERPL-MCNC: 0.4 MG/DL (ref 0.2–1.3)
BUN SERPL-MCNC: 10 MG/DL (ref 7–30)
CALCIUM SERPL-MCNC: 8.7 MG/DL (ref 8.5–10.1)
CHLORIDE BLD-SCNC: 106 MMOL/L (ref 94–109)
CHOLEST SERPL-MCNC: 233 MG/DL
CO2 SERPL-SCNC: 26 MMOL/L (ref 20–32)
CREAT SERPL-MCNC: 1.02 MG/DL (ref 0.52–1.04)
FASTING STATUS PATIENT QL REPORTED: NO
FASTING STATUS PATIENT QL REPORTED: NO
GFR SERPL CREATININE-BSD FRML MDRD: 67 ML/MIN/1.73M2
GLUCOSE BLD-MCNC: 91 MG/DL (ref 70–99)
GLUCOSE BLD-MCNC: 91 MG/DL (ref 70–99)
HDLC SERPL-MCNC: 59 MG/DL
LDLC SERPL CALC-MCNC: 143 MG/DL
NONHDLC SERPL-MCNC: 174 MG/DL
POTASSIUM BLD-SCNC: 4.4 MMOL/L (ref 3.4–5.3)
PROT SERPL-MCNC: 7.6 G/DL (ref 6.8–8.8)
SODIUM SERPL-SCNC: 138 MMOL/L (ref 133–144)
T4 FREE SERPL-MCNC: 1.1 NG/DL (ref 0.76–1.46)
TRIGL SERPL-MCNC: 154 MG/DL
TSH SERPL DL<=0.005 MIU/L-ACNC: 5.64 MU/L (ref 0.4–4)

## 2022-02-16 ENCOUNTER — VIRTUAL VISIT (OUTPATIENT)
Dept: PHYSICAL MEDICINE AND REHAB | Facility: CLINIC | Age: 52
End: 2022-02-16
Attending: NURSE PRACTITIONER
Payer: OTHER MISCELLANEOUS

## 2022-02-16 DIAGNOSIS — U09.9 POST-COVID CHRONIC FATIGUE: Primary | ICD-10-CM

## 2022-02-16 DIAGNOSIS — G93.32 POST-COVID CHRONIC FATIGUE: Primary | ICD-10-CM

## 2022-02-16 DIAGNOSIS — U09.9 POST-COVID CHRONIC PALPITATIONS: ICD-10-CM

## 2022-02-16 DIAGNOSIS — R06.09 POST-COVID CHRONIC DYSPNEA: ICD-10-CM

## 2022-02-16 DIAGNOSIS — R00.2 POST-COVID CHRONIC PALPITATIONS: ICD-10-CM

## 2022-02-16 DIAGNOSIS — U09.9 POST-COVID CHRONIC DYSPNEA: ICD-10-CM

## 2022-02-16 DIAGNOSIS — R07.89 CHEST PRESSURE: ICD-10-CM

## 2022-02-16 PROCEDURE — 99205 OFFICE O/P NEW HI 60 MIN: CPT | Mod: 95 | Performed by: STUDENT IN AN ORGANIZED HEALTH CARE EDUCATION/TRAINING PROGRAM

## 2022-02-16 SDOH — SOCIAL STABILITY: SOCIAL NETWORK: I HAVE TROUBLE DOING ALL OF THE ACTIVITIES WITH FRIENDS THAT I WANT TO DO: NEVER

## 2022-02-16 SDOH — SOCIAL STABILITY: SOCIAL NETWORK: I HAVE TROUBLE DOING ALL OF MY REGULAR LEISURE ACTIVITIES WITH OTHERS: RARELY

## 2022-02-16 SDOH — SOCIAL STABILITY: SOCIAL NETWORK: I HAVE TROUBLE DOING ALL OF THE FAMILY ACTIVITIES THAT I WANT TO DO: RARELY

## 2022-02-16 SDOH — SOCIAL STABILITY: SOCIAL NETWORK

## 2022-02-16 SDOH — SOCIAL STABILITY: SOCIAL NETWORK: PROMIS ABILITY TO PARTICIPATE IN SOCIAL ROLES & ACTIVITIES T-SCORE: 55.6

## 2022-02-16 SDOH — SOCIAL STABILITY: SOCIAL NETWORK: I HAVE TROUBLE DOING ALL OF MY USUAL WORK (INCLUDE WORK AT HOME): NEVER

## 2022-02-16 ASSESSMENT — ENCOUNTER SYMPTOMS
WHEEZING: 0
SLEEP DISTURBANCES DUE TO BREATHING: 0
HYPOTENSION: 0
DEPRESSION: 1
INSOMNIA: 0
COUGH: 0
POLYDIPSIA: 0
PANIC: 0
DYSPNEA ON EXERTION: 1
NIGHT SWEATS: 0
HYPOTENSION: 0
POLYPHAGIA: 0
POSTURAL DYSPNEA: 0
LEG PAIN: 0
HEMOPTYSIS: 0
FEVER: 0
SPUTUM PRODUCTION: 0
NIGHT SWEATS: 0
WEIGHT GAIN: 1
DECREASED APPETITE: 0
WEIGHT GAIN: 1
SYNCOPE: 0
DECREASED CONCENTRATION: 0
INCREASED ENERGY: 1
HALLUCINATIONS: 0
POLYPHAGIA: 0
NERVOUS/ANXIOUS: 0
FATIGUE: 1
LIGHT-HEADEDNESS: 0
INCREASED ENERGY: 1
PANIC: 0
ALTERED TEMPERATURE REGULATION: 0
SLEEP DISTURBANCES DUE TO BREATHING: 0
POSTURAL DYSPNEA: 0
SNORES LOUDLY: 1
SNORES LOUDLY: 1
FEVER: 0
PALPITATIONS: 1
SPUTUM PRODUCTION: 0
ALTERED TEMPERATURE REGULATION: 0
CHILLS: 0
DYSPNEA ON EXERTION: 1
SYNCOPE: 0
COUGH DISTURBING SLEEP: 0
SHORTNESS OF BREATH: 1
EXERCISE INTOLERANCE: 1
CHILLS: 0
PALPITATIONS: 1
HEMOPTYSIS: 0
HYPERTENSION: 0
POLYDIPSIA: 0
COUGH DISTURBING SLEEP: 0
ORTHOPNEA: 0
COUGH: 0
HYPERTENSION: 0
EXERCISE INTOLERANCE: 1
NERVOUS/ANXIOUS: 0
HALLUCINATIONS: 0
WEIGHT LOSS: 0
WEIGHT LOSS: 0
FATIGUE: 1
DEPRESSION: 1
DECREASED APPETITE: 0
WHEEZING: 0
DECREASED CONCENTRATION: 0
LIGHT-HEADEDNESS: 0
INSOMNIA: 0
SHORTNESS OF BREATH: 1
LEG PAIN: 0
ORTHOPNEA: 0

## 2022-02-16 ASSESSMENT — ANXIETY QUESTIONNAIRES
5. BEING SO RESTLESS THAT IT IS HARD TO SIT STILL: NOT AT ALL
GAD7 TOTAL SCORE: 2
6. BECOMING EASILY ANNOYED OR IRRITABLE: NOT AT ALL
1. FEELING NERVOUS, ANXIOUS, OR ON EDGE: NOT AT ALL
GAD7 TOTAL SCORE: 2
4. TROUBLE RELAXING: MORE THAN HALF THE DAYS
7. FEELING AFRAID AS IF SOMETHING AWFUL MIGHT HAPPEN: NOT AT ALL
2. NOT BEING ABLE TO STOP OR CONTROL WORRYING: NOT AT ALL
3. WORRYING TOO MUCH ABOUT DIFFERENT THINGS: NOT AT ALL
7. FEELING AFRAID AS IF SOMETHING AWFUL MIGHT HAPPEN: NOT AT ALL
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
## 2022-02-17 ASSESSMENT — ANXIETY QUESTIONNAIRES: GAD7 TOTAL SCORE: 2

## 2022-02-22 ENCOUNTER — OFFICE VISIT (OUTPATIENT)
Dept: PULMONOLOGY | Facility: CLINIC | Age: 52
End: 2022-02-22
Attending: INTERNAL MEDICINE
Payer: COMMERCIAL

## 2022-02-22 ENCOUNTER — TRANSFERRED RECORDS (OUTPATIENT)
Dept: HEALTH INFORMATION MANAGEMENT | Facility: CLINIC | Age: 52
End: 2022-02-22

## 2022-02-22 VITALS
HEART RATE: 91 BPM | DIASTOLIC BLOOD PRESSURE: 84 MMHG | SYSTOLIC BLOOD PRESSURE: 144 MMHG | WEIGHT: 180 LBS | HEIGHT: 69 IN | OXYGEN SATURATION: 99 % | RESPIRATION RATE: 16 BRPM | BODY MASS INDEX: 26.66 KG/M2

## 2022-02-22 DIAGNOSIS — R06.09 DYSPNEA ON EXERTION: Primary | ICD-10-CM

## 2022-02-22 PROCEDURE — 99214 OFFICE O/P EST MOD 30 MIN: CPT | Performed by: INTERNAL MEDICINE

## 2022-02-22 PROCEDURE — G0463 HOSPITAL OUTPT CLINIC VISIT: HCPCS

## 2022-02-22 ASSESSMENT — PAIN SCALES - GENERAL: PAINLEVEL: NO PAIN (0)

## 2022-02-22 NOTE — LETTER
2/22/2022     RE: Hailey Stark  [address]  [facility] [address]    Dear Colleague,    Thank you for referring your patient, Hailey Stark, to the [facility]G [facility]ENCE [facility] CLINIC [place]. Please see a copy of my visit note below.    Reason for Visit  Hailey Stark is a 51 year old year old female who is being seen for No chief complaint on file.    Pulmonary HPI  52 YO with history of COVID 19 infection in 11-20 associated with ongoing SOB/SIFUENTES and chest pressure. Not hospitalized. Developed SOB and SIFUENTES after COVID diagnosis. SOB when walking a flight of stairs or when walking at work.  Also has chest tightness throughout the day including at rest.  SOB also present at rest.  No cough.  + fatigue and brain fog.  Symptoms have been present since after COVID but have not significantly changed.  Is back working full time but goes to bed after work and sleeps most of the day on her days off.  Does not do any regular exercise.  Was recently seen by PMR with suggestions of taking time off of work.  Has had weight gain since COVID.  Has not discussed situation with HR.  Anxiety and depression since COVID diagnosis.  Was seen in Pulmonary clinic 4-21.  Spirometry numbers normal at that visit, although flow volume loop was abnormal.  ECHO was ordered at that visit and done on 9-21 that was normal.    The patient was seen and examined by Satinder Encinas MD           Current Outpatient Medications   Medication     albuterol (PROAIR HFA/PROVENTIL HFA/VENTOLIN HFA) 108 (90 Base) MCG/ACT inhaler     buPROPion (WELLBUTRIN XL) 150 MG 24 hr tablet     fluticasone-salmeterol (ADVAIR) 250-50 MCG/DOSE inhaler     ibuprofen (ADVIL,MOTRIN) 800 MG tablet     PARoxetine (PAXIL) 20 MG tablet     No current facility-administered medications for this visit.     Allergies   Allergen Reactions     Meloxicam      Tongue swelling     Past Medical History:   Diagnosis Date     Anxiety state,  unspecified     Anxiety     Cardiac dysrhythmia, unspecified     Arrhythmia NOS s/p ablation     Chronic peptic ulcer, unspecified site, without mention of hemorrhage, perforation, or obstruction     Ulcer, Peptic     Depressive disorder, not elsewhere classified     Depression (non-psychotic)     Leukorrhea, not specified as infective 11/18/2009    heavy, daily, yellow/green mucoid dischg following retained tampon removal.Tx w flagyl, metrogel, Cleocin, Diflucan, doxycycline, boric acid capsules. 5/16/2011 + GBS.      Menarche age 13    cycles q 30 x 5 d, heavy     Moderate dysplasia of cervix (KENNY II) 1998     Normal Papssince LEEP->routine screening       Past Surgical History:   Procedure Laterality Date     CARDIAC SURGERY  2000    Catheter ablation     Cervical Conization Loop Electrode Excision  1998    KENNY II  F/U Pap q 3 mo x 2 yrs were all NORMAL     COSMETIC SURGERY  2005& 2006    Liposuction     EYE SURGERY  2006    Lasix     KIDNEY SURGERY  summer 2012    6 kidney stone excision/stent placed     LASIK       ORTHOPEDIC SURGERY  2012    Bunionectomy     SURGICAL HISTORY OF -       lasik     SURGICAL HISTORY OF -       catheter ablation heart atrial fib tx     VASCULAR SURGERY  Feb. 2020    Adhesive ablation       Social History     Socioeconomic History     Marital status: Single     Spouse name: Not on file     Number of children: Not on file     Years of education: Not on file     Highest education level: Not on file   Occupational History     Not on file   Tobacco Use     Smoking status: Never Smoker     Smokeless tobacco: Never Used   Substance and Sexual Activity     Alcohol use: Not Currently     Comment: 1-2 beers 1-2Xs/mo     Drug use: No     Sexual activity: Yes     Partners: Male     Birth control/protection: None   Other Topics Concern     Parent/sibling w/ CABG, MI or angioplasty before 65F 55M? No      Service No     Blood Transfusions No     Caffeine Concern No     Comment: 2 s/d      Occupational Exposure Yes     Comment: LPN at  Center     Hobby Hazards No     Sleep Concern Yes     Comment: Sleeps too much -- tired after 11 hr sleep     Stress Concern No     Comment: work; $ bills-->coping     Weight Concern Yes     Comment: gained 30 lb on Paxil     Special Diet Yes     Comment: vegetarian     Back Care Yes     Comment: lower back injury w chronic stiffness     Exercise Yes     Comment: sedentary     Bike Helmet Yes     Seat Belt No     Self-Exams No   Social History Narrative    Dairy/d 5 servings/d.     Caffeine 0 servings/d    Exercise 4 x week    Sunscreen used - Yes    Seatbelts used - Yes    Working smoke/CO detectors in the home - Yes    Guns stored in the home - No    Self Breast Exams - No    Self Testicular Exam - NA    Eye Exam up to date - Yes    Dental Exam up to date - No    Pap Smear up to date - Yes    Mammogram up to date - NA    PSA up to date - NA    Dexa Scan up to date - NA    Flex Sig / Colonoscopy up to date - Yes    Immunizations up to date - Yes    Abuse: Current or Past(Physical, Sexual or Emotional)- No    Do you feel safe in your environment - Yes         Social Determinants of Health     Financial Resource Strain: Not on file   Food Insecurity: Not on file   Transportation Needs: Not on file   Physical Activity: Not on file   Stress: Not on file   Social Connections: Not on file   Intimate Partner Violence: Not on file   Housing Stability: Not on file       ROS Pulmonary  A complete ROS was otherwise negative except as noted in the HPI.  There were no vitals taken for this visit.  Exam:   GENERAL APPEARANCE: Well developed, well nourished, alert, and in no apparent distress.  EYES: PERRL, EOMI  HENT: Nasal mucosa with no edema and no hyperemia. No nasal polyps.  No stridor.  EARS: Canals clear, TMs normal  MOUTH: Oral mucosa is moist, without any lesions, no tonsillar enlargement, no oropharyngeal exudate.  NECK: supple, no masses, no thyromegaly.  LYMPHATICS: No  significant axillary, cervical, or supraclavicular nodes.  RESP: Good air flow throughout.  No crackles. No rhonchi. No wheezes.  CV: Normal S1, S2, regular rhythm, normal rate. No murmur.  No rub. No gallop. No LE edema.   ABDOMEN:  Bowel sounds normal, soft, nontender, no HSM or masses.   MS: extremities normal. No clubbing. No cyanosis.  SKIN: no rash on limited exam  NEURO: Mentation intact, speech normal, normal strength and tone, normal gait and stance  PSYCH: mentation appears normal. and affect normal/bright  Results:  No results found for this or any previous visit (from the past 168 hour(s)).    Assessment and plan:   50 YO with continued symptoms of SOB, SIFUENTES, brain fog and fatigue after COVID infection. Symptoms have been prolonged; is back to work full time but has significant fatigue after work. All pulmonary work-up has been negative including CXR, spirometry and ECHO.  Abnormal flow volume loops are suggestive of VCD, needs repeat testing to assess.  Being evaluated by PMR which has been helpful.    1. To discuss with HR regarding short term disability and referral to PT and OT.  Follow-up with PMR.  2. Repeat spirometry  3. May need referral to SLP to evaluate for vocal cord dysfunction as inspiratory and expiratory loops were abnormal on PFT's in 4-21  4. Discussed talking with a therapist regarding stress and anxiety related to post-COVID symptoms    RTC 2 months.  Advised patient to contact the clinic with any questions or concerns        Answers for HPI/ROS submitted by the patient on 2/16/2022  General Symptoms: Yes  Skin Symptoms: No  HENT Symptoms: No  EYE SYMPTOMS: No  HEART SYMPTOMS: Yes  LUNG SYMPTOMS: Yes  INTESTINAL SYMPTOMS: No  URINARY SYMPTOMS: No  GYNECOLOGIC SYMPTOMS: No  BREAST SYMPTOMS: No  SKELETAL SYMPTOMS: No  BLOOD SYMPTOMS: No  NERVOUS SYSTEM SYMPTOMS: No  MENTAL HEALTH SYMPTOMS: Yes  Fever: No  Loss of appetite: No  Weight loss: No  Weight gain: Yes  Fatigue: Yes  Night  sweats: No  Chills: No  Increased stress: Yes  Excessive hunger: No  Excessive thirst: No  Feeling hot or cold when others believe the temperature is normal: No  Loss of height: No  Post-operative complications: No  Surgical site pain: No  Hallucinations: No  Change in or Loss of Energy: Yes  Hyperactivity: No  Confusion: No  Chest pain or pressure: Yes  Fast or irregular heartbeat: Yes  Pain in legs with walking: No  Trouble breathing while lying down: No  Fingers or toes appear blue: No  High blood pressure: No  Low blood pressure: No  Fainting: No  Murmurs: No  Pacemaker: No  Varicose veins: No  Edema or swelling: No  Wake up at night with shortness of breath: No  Light-headedness: No  Exercise intolerance: Yes  Cough: No  Sputum or phlegm: No  Coughing up blood: No  Difficulty breating or shortness of breath: Yes  Snoring: Yes  Wheezing: No  Difficulty breathing on exertion: Yes  Nighttime Cough: No  Difficulty breathing when lying flat: No  Nervous or Anxious: No  Depression: Yes  Trouble sleeping: No  Trouble thinking or concentrating: No  Mood changes: No  Panic attacks: No  Again, thank you for allowing me to participate in the care of your patient.      Sincerely,    Satinder Encinas MD

## 2022-02-22 NOTE — NURSING NOTE
Chief Complaint   Patient presents with     RECHECK     Return Dyspnea on exertion    Medications reviewed and vital signs taken.   Shameka Mcdonough, CMA

## 2022-02-22 NOTE — PROGRESS NOTES
Reason for Visit  Hailey Stark is a 51 year old year old female who is being seen for No chief complaint on file.    Pulmonary HPI  52 YO with history of COVID 19 infection in 11-20 associated with ongoing SOB/SIFUENTES and chest pressure. Not hospitalized. Developed SOB and SIFUENTES after COVID diagnosis. SOB when walking a flight of stairs or when walking at work.  Also has chest tightness throughout the day including at rest.  SOB also present at rest.  No cough.  + fatigue and brain fog.  Symptoms have been present since after COVID but have not significantly changed.  Is back working full time but goes to bed after work and sleeps most of the day on her days off.  Does not do any regular exercise.  Was recently seen by PMR with suggestions of taking time off of work.  Has had weight gain since COVID.  Has not discussed situation with HR.  Anxiety and depression since COVID diagnosis.  Was seen in Pulmonary clinic 4-21.  Spirometry numbers normal at that visit, although flow volume loop was abnormal.  ECHO was ordered at that visit and done on 9-21 that was normal.    The patient was seen and examined by Satinder Encinas MD           Current Outpatient Medications   Medication     albuterol (PROAIR HFA/PROVENTIL HFA/VENTOLIN HFA) 108 (90 Base) MCG/ACT inhaler     buPROPion (WELLBUTRIN XL) 150 MG 24 hr tablet     fluticasone-salmeterol (ADVAIR) 250-50 MCG/DOSE inhaler     ibuprofen (ADVIL,MOTRIN) 800 MG tablet     PARoxetine (PAXIL) 20 MG tablet     No current facility-administered medications for this visit.     Allergies   Allergen Reactions     Meloxicam      Tongue swelling     Past Medical History:   Diagnosis Date     Anxiety state, unspecified     Anxiety     Cardiac dysrhythmia, unspecified     Arrhythmia NOS s/p ablation     Chronic peptic ulcer, unspecified site, without mention of hemorrhage, perforation, or obstruction     Ulcer, Peptic     Depressive disorder, not elsewhere classified     Depression  (non-psychotic)     Leukorrhea, not specified as infective 11/18/2009    heavy, daily, yellow/green mucoid dischg following retained tampon removal.Tx w flagyl, metrogel, Cleocin, Diflucan, doxycycline, boric acid capsules. 5/16/2011 + GBS.      Menarche age 13    cycles q 30 x 5 d, heavy     Moderate dysplasia of cervix (KENNY II) 1998     Normal Papssince LEEP->routine screening       Past Surgical History:   Procedure Laterality Date     CARDIAC SURGERY  2000    Catheter ablation     Cervical Conization Loop Electrode Excision  1998    KENNY II  F/U Pap q 3 mo x 2 yrs were all NORMAL     COSMETIC SURGERY  2005& 2006    Liposuction     EYE SURGERY  2006    Lasix     KIDNEY SURGERY  summer 2012    6 kidney stone excision/stent placed     LASIK       ORTHOPEDIC SURGERY  2012    Bunionectomy     SURGICAL HISTORY OF -       lasik     SURGICAL HISTORY OF -       catheter ablation heart atrial fib tx     VASCULAR SURGERY  Feb. 2020    Adhesive ablation       Social History     Socioeconomic History     Marital status: Single     Spouse name: Not on file     Number of children: Not on file     Years of education: Not on file     Highest education level: Not on file   Occupational History     Not on file   Tobacco Use     Smoking status: Never Smoker     Smokeless tobacco: Never Used   Substance and Sexual Activity     Alcohol use: Not Currently     Comment: 1-2 beers 1-2Xs/mo     Drug use: No     Sexual activity: Yes     Partners: Male     Birth control/protection: None   Other Topics Concern     Parent/sibling w/ CABG, MI or angioplasty before 65F 55M? No      Service No     Blood Transfusions No     Caffeine Concern No     Comment: 2 s/d     Occupational Exposure Yes     Comment: LPN at [employer]     Hobby Hazards No     Sleep Concern Yes     Comment: Sleeps too much -- tired after 11 hr sleep     Stress Concern No     Comment: work; $ bills-->coping     Weight Concern Yes     Comment: gained 30 lb on Paxil      Special Diet Yes     Comment: vegetarian     Back Care Yes     Comment: lower back injury w chronic stiffness     Exercise Yes     Comment: sedentary     Bike Helmet Yes     Seat Belt No     Self-Exams No   Social History Narrative    Dairy/d 5 servings/d.     Caffeine 0 servings/d    Exercise 4 x week    Sunscreen used - Yes    Seatbelts used - Yes    Working smoke/CO detectors in the home - Yes    Guns stored in the home - No    Self Breast Exams - No    Self Testicular Exam - NA    Eye Exam up to date - Yes    Dental Exam up to date - No    Pap Smear up to date - Yes    Mammogram up to date - NA    PSA up to date - NA    Dexa Scan up to date - NA    Flex Sig / Colonoscopy up to date - Yes    Immunizations up to date - Yes    Abuse: Current or Past(Physical, Sexual or Emotional)- No    Do you feel safe in your environment - Yes         Social Determinants of Health     Financial Resource Strain: Not on file   Food Insecurity: Not on file   Transportation Needs: Not on file   Physical Activity: Not on file   Stress: Not on file   Social Connections: Not on file   Intimate Partner Violence: Not on file   Housing Stability: Not on file       ROS Pulmonary  A complete ROS was otherwise negative except as noted in the HPI.  There were no vitals taken for this visit.  Exam:   GENERAL APPEARANCE: Well developed, well nourished, alert, and in no apparent distress.  EYES: PERRL, EOMI  HENT: Nasal mucosa with no edema and no hyperemia. No nasal polyps.  No stridor.  EARS: Canals clear, TMs normal  MOUTH: Oral mucosa is moist, without any lesions, no tonsillar enlargement, no oropharyngeal exudate.  NECK: supple, no masses, no thyromegaly.  LYMPHATICS: No significant axillary, cervical, or supraclavicular nodes.  RESP: Good air flow throughout.  No crackles. No rhonchi. No wheezes.  CV: Normal S1, S2, regular rhythm, normal rate. No murmur.  No rub. No gallop. No LE edema.   ABDOMEN:  Bowel sounds normal, soft, nontender, no  HSM or masses.   MS: extremities normal. No clubbing. No cyanosis.  SKIN: no rash on limited exam  NEURO: Mentation intact, speech normal, normal strength and tone, normal gait and stance  PSYCH: mentation appears normal. and affect normal/bright  Results:  No results found for this or any previous visit (from the past 168 hour(s)).    Assessment and plan:   50 YO with continued symptoms of SOB, SIFUENTES, brain fog and fatigue after COVID infection. Symptoms have been prolonged; is back to work full time but has significant fatigue after work. All pulmonary work-up has been negative including CXR, spirometry and ECHO.  Abnormal flow volume loops are suggestive of VCD, needs repeat testing to assess.  Being evaluated by PMR which has been helpful.    1. To discuss with HR regarding short term disability and referral to PT and OT.  Follow-up with PMR.  2. Repeat spirometry  3. May need referral to SLP to evaluate for vocal cord dysfunction as inspiratory and expiratory loops were abnormal on PFT's in 4-21  4. Discussed talking with a therapist regarding stress and anxiety related to post-COVID symptoms    RTC 2 months.  Advised patient to contact the clinic with any questions or concerns        Answers for HPI/ROS submitted by the patient on 2/16/2022  General Symptoms: Yes  Skin Symptoms: No  HENT Symptoms: No  EYE SYMPTOMS: No  HEART SYMPTOMS: Yes  LUNG SYMPTOMS: Yes  INTESTINAL SYMPTOMS: No  URINARY SYMPTOMS: No  GYNECOLOGIC SYMPTOMS: No  BREAST SYMPTOMS: No  SKELETAL SYMPTOMS: No  BLOOD SYMPTOMS: No  NERVOUS SYSTEM SYMPTOMS: No  MENTAL HEALTH SYMPTOMS: Yes  Fever: No  Loss of appetite: No  Weight loss: No  Weight gain: Yes  Fatigue: Yes  Night sweats: No  Chills: No  Increased stress: Yes  Excessive hunger: No  Excessive thirst: No  Feeling hot or cold when others believe the temperature is normal: No  Loss of height: No  Post-operative complications: No  Surgical site pain: No  Hallucinations: No  Change in or Loss of  Energy: Yes  Hyperactivity: No  Confusion: No  Chest pain or pressure: Yes  Fast or irregular heartbeat: Yes  Pain in legs with walking: No  Trouble breathing while lying down: No  Fingers or toes appear blue: No  High blood pressure: No  Low blood pressure: No  Fainting: No  Murmurs: No  Pacemaker: No  Varicose veins: No  Edema or swelling: No  Wake up at night with shortness of breath: No  Light-headedness: No  Exercise intolerance: Yes  Cough: No  Sputum or phlegm: No  Coughing up blood: No  Difficulty breating or shortness of breath: Yes  Snoring: Yes  Wheezing: No  Difficulty breathing on exertion: Yes  Nighttime Cough: No  Difficulty breathing when lying flat: No  Nervous or Anxious: No  Depression: Yes  Trouble sleeping: No  Trouble thinking or concentrating: No  Mood changes: No  Panic attacks: No

## 2022-02-23 ENCOUNTER — DOCUMENTATION ONLY (OUTPATIENT)
Dept: PHYSICAL MEDICINE AND REHAB | Facility: CLINIC | Age: 52
End: 2022-02-23
Payer: COMMERCIAL

## 2022-02-25 ENCOUNTER — TELEPHONE (OUTPATIENT)
Dept: PULMONOLOGY | Facility: CLINIC | Age: 52
End: 2022-02-25
Payer: COMMERCIAL

## 2022-02-27 ENCOUNTER — HEALTH MAINTENANCE LETTER (OUTPATIENT)
Age: 52
End: 2022-02-27

## 2022-02-28 ENCOUNTER — LAB (OUTPATIENT)
Dept: LAB | Facility: CLINIC | Age: 52
End: 2022-02-28
Payer: COMMERCIAL

## 2022-02-28 ENCOUNTER — TELEPHONE (OUTPATIENT)
Dept: PULMONOLOGY | Facility: CLINIC | Age: 52
End: 2022-02-28
Payer: COMMERCIAL

## 2022-02-28 DIAGNOSIS — Z11.59 NEED FOR HEPATITIS C SCREENING TEST: ICD-10-CM

## 2022-02-28 DIAGNOSIS — R79.89 ELEVATED TSH: ICD-10-CM

## 2022-02-28 DIAGNOSIS — Z11.4 SCREENING FOR HIV (HUMAN IMMUNODEFICIENCY VIRUS): ICD-10-CM

## 2022-02-28 PROCEDURE — 84439 ASSAY OF FREE THYROXINE: CPT

## 2022-02-28 PROCEDURE — 87389 HIV-1 AG W/HIV-1&-2 AB AG IA: CPT

## 2022-02-28 PROCEDURE — 86803 HEPATITIS C AB TEST: CPT

## 2022-02-28 PROCEDURE — 84443 ASSAY THYROID STIM HORMONE: CPT

## 2022-02-28 PROCEDURE — 36415 COLL VENOUS BLD VENIPUNCTURE: CPT

## 2022-03-01 LAB
HCV AB SERPL QL IA: NONREACTIVE
HIV 1+2 AB+HIV1 P24 AG SERPL QL IA: NONREACTIVE
T4 FREE SERPL-MCNC: 1.01 NG/DL (ref 0.76–1.46)
TSH SERPL DL<=0.005 MIU/L-ACNC: 4.16 MU/L (ref 0.4–4)

## 2022-03-01 RX ORDER — LEVOTHYROXINE SODIUM 50 UG/1
50 TABLET ORAL DAILY
Qty: 56 TABLET | Refills: 0 | Status: SHIPPED | OUTPATIENT
Start: 2022-03-01 | End: 2022-04-20

## 2022-03-02 ENCOUNTER — TELEPHONE (OUTPATIENT)
Dept: PULMONOLOGY | Facility: CLINIC | Age: 52
End: 2022-03-02
Payer: COMMERCIAL

## 2022-03-10 ENCOUNTER — TELEPHONE (OUTPATIENT)
Dept: PULMONOLOGY | Facility: CLINIC | Age: 52
End: 2022-03-10
Payer: COMMERCIAL

## 2022-03-10 NOTE — TELEPHONE ENCOUNTER
Patient called to schedule 2 month follow up with Dr. Encinas in addition to a pulmonary function test. Next available appointment with Dr. Encinas was not until 6/7/2022. Patient was agreeable to scheduling appointment on 6/7/2022 and not late April. Patient stated that it was okay to schedule appointment on 6/7/2022. Details of appointment confirmed with patient.

## 2022-04-04 ENCOUNTER — MYC MEDICAL ADVICE (OUTPATIENT)
Dept: PULMONOLOGY | Facility: CLINIC | Age: 52
End: 2022-04-04
Payer: COMMERCIAL

## 2022-04-11 ENCOUNTER — TELEPHONE (OUTPATIENT)
Dept: PULMONOLOGY | Facility: CLINIC | Age: 52
End: 2022-04-11
Payer: COMMERCIAL

## 2022-04-11 ENCOUNTER — MYC MEDICAL ADVICE (OUTPATIENT)
Dept: FAMILY MEDICINE | Facility: CLINIC | Age: 52
End: 2022-04-11
Payer: COMMERCIAL

## 2022-04-11 NOTE — TELEPHONE ENCOUNTER
Patient sent second my chart concerning rapid pulse and SOB. Her oxygen sat is 98 % and HR is 139. Have requested that she speak with PCP and maybe obtain referral to cardiology. Inquired if she may be dehydrated and she was not sure. She reports that she always has good oxygen saturations. Patient will call PCP.

## 2022-04-12 ENCOUNTER — APPOINTMENT (OUTPATIENT)
Dept: GENERAL RADIOLOGY | Facility: CLINIC | Age: 52
End: 2022-04-12
Attending: EMERGENCY MEDICINE
Payer: COMMERCIAL

## 2022-04-12 ENCOUNTER — HOSPITAL ENCOUNTER (EMERGENCY)
Facility: CLINIC | Age: 52
Discharge: HOME | End: 2022-04-13
Attending: EMERGENCY MEDICINE | Admitting: EMERGENCY MEDICINE
Payer: COMMERCIAL

## 2022-04-12 ENCOUNTER — NURSE TRIAGE (OUTPATIENT)
Dept: FAMILY MEDICINE | Facility: CLINIC | Age: 52
End: 2022-04-12
Payer: COMMERCIAL

## 2022-04-12 VITALS
TEMPERATURE: 97.4 F | SYSTOLIC BLOOD PRESSURE: 132 MMHG | WEIGHT: 180 LBS | HEART RATE: 77 BPM | DIASTOLIC BLOOD PRESSURE: 81 MMHG | BODY MASS INDEX: 26.58 KG/M2 | OXYGEN SATURATION: 98 % | RESPIRATION RATE: 19 BRPM

## 2022-04-12 DIAGNOSIS — Z98.890 H/O PRIOR ABLATION TREATMENT: ICD-10-CM

## 2022-04-12 DIAGNOSIS — R00.0 HEART RATE FAST: ICD-10-CM

## 2022-04-12 LAB
ALBUMIN SERPL-MCNC: 3.5 G/DL (ref 3.4–5)
ALP SERPL-CCNC: 76 U/L (ref 40–150)
ALT SERPL W P-5'-P-CCNC: 20 U/L (ref 0–50)
ANION GAP SERPL CALCULATED.3IONS-SCNC: 7 MMOL/L (ref 3–14)
AST SERPL W P-5'-P-CCNC: 22 U/L (ref 0–45)
BASOPHILS # BLD AUTO: 0.1 10E3/UL (ref 0–0.2)
BASOPHILS NFR BLD AUTO: 1 %
BILIRUB SERPL-MCNC: 0.5 MG/DL (ref 0.2–1.3)
BUN SERPL-MCNC: 10 MG/DL (ref 7–30)
CALCIUM SERPL-MCNC: 8.5 MG/DL (ref 8.5–10.1)
CHLORIDE BLD-SCNC: 106 MMOL/L (ref 94–109)
CO2 SERPL-SCNC: 26 MMOL/L (ref 20–32)
CREAT SERPL-MCNC: 0.96 MG/DL (ref 0.52–1.04)
D DIMER PPP FEU-MCNC: 0.48 UG/ML FEU (ref 0–0.5)
EOSINOPHIL # BLD AUTO: 0.1 10E3/UL (ref 0–0.7)
EOSINOPHIL NFR BLD AUTO: 2 %
ERYTHROCYTE [DISTWIDTH] IN BLOOD BY AUTOMATED COUNT: 13.4 % (ref 10–15)
GFR SERPL CREATININE-BSD FRML MDRD: 72 ML/MIN/1.73M2
GLUCOSE BLD-MCNC: 88 MG/DL (ref 70–99)
HCT VFR BLD AUTO: 36.4 % (ref 35–47)
HGB BLD-MCNC: 11.9 G/DL (ref 11.7–15.7)
IMM GRANULOCYTES # BLD: 0 10E3/UL
IMM GRANULOCYTES NFR BLD: 0 %
LYMPHOCYTES # BLD AUTO: 2 10E3/UL (ref 0.8–5.3)
LYMPHOCYTES NFR BLD AUTO: 30 %
MAGNESIUM SERPL-MCNC: 2.2 MG/DL (ref 1.6–2.3)
MCH RBC QN AUTO: 28.1 PG (ref 26.5–33)
MCHC RBC AUTO-ENTMCNC: 32.7 G/DL (ref 31.5–36.5)
MCV RBC AUTO: 86 FL (ref 78–100)
MONOCYTES # BLD AUTO: 0.5 10E3/UL (ref 0–1.3)
MONOCYTES NFR BLD AUTO: 8 %
NEUTROPHILS # BLD AUTO: 3.8 10E3/UL (ref 1.6–8.3)
NEUTROPHILS NFR BLD AUTO: 59 %
NRBC # BLD AUTO: 0 10E3/UL
NRBC BLD AUTO-RTO: 0 /100
NT-PROBNP SERPL-MCNC: 77 PG/ML (ref 0–900)
PLATELET # BLD AUTO: 316 10E3/UL (ref 150–450)
POTASSIUM BLD-SCNC: 3.7 MMOL/L (ref 3.4–5.3)
PROT SERPL-MCNC: 7.3 G/DL (ref 6.8–8.8)
RBC # BLD AUTO: 4.24 10E6/UL (ref 3.8–5.2)
SODIUM SERPL-SCNC: 139 MMOL/L (ref 133–144)
TROPONIN I SERPL HS-MCNC: <3 NG/L
TROPONIN I SERPL HS-MCNC: <3 NG/L
TSH SERPL DL<=0.005 MIU/L-ACNC: 1.82 MU/L (ref 0.4–4)
WBC # BLD AUTO: 6.5 10E3/UL (ref 4–11)

## 2022-04-12 PROCEDURE — 83735 ASSAY OF MAGNESIUM: CPT | Performed by: EMERGENCY MEDICINE

## 2022-04-12 PROCEDURE — 71046 X-RAY EXAM CHEST 2 VIEWS: CPT | Mod: 26 | Performed by: RADIOLOGY

## 2022-04-12 PROCEDURE — 71046 X-RAY EXAM CHEST 2 VIEWS: CPT

## 2022-04-12 PROCEDURE — 258N000003 HC RX IP 258 OP 636: Performed by: EMERGENCY MEDICINE

## 2022-04-12 PROCEDURE — 36415 COLL VENOUS BLD VENIPUNCTURE: CPT | Performed by: EMERGENCY MEDICINE

## 2022-04-12 PROCEDURE — 84443 ASSAY THYROID STIM HORMONE: CPT | Performed by: EMERGENCY MEDICINE

## 2022-04-12 PROCEDURE — 93010 ELECTROCARDIOGRAM REPORT: CPT | Performed by: EMERGENCY MEDICINE

## 2022-04-12 PROCEDURE — 96361 HYDRATE IV INFUSION ADD-ON: CPT | Performed by: EMERGENCY MEDICINE

## 2022-04-12 PROCEDURE — 84484 ASSAY OF TROPONIN QUANT: CPT | Performed by: EMERGENCY MEDICINE

## 2022-04-12 PROCEDURE — 93005 ELECTROCARDIOGRAM TRACING: CPT | Performed by: EMERGENCY MEDICINE

## 2022-04-12 PROCEDURE — 80053 COMPREHEN METABOLIC PANEL: CPT | Performed by: EMERGENCY MEDICINE

## 2022-04-12 PROCEDURE — 99285 EMERGENCY DEPT VISIT HI MDM: CPT | Mod: 25 | Performed by: EMERGENCY MEDICINE

## 2022-04-12 PROCEDURE — 96360 HYDRATION IV INFUSION INIT: CPT | Performed by: EMERGENCY MEDICINE

## 2022-04-12 PROCEDURE — 85379 FIBRIN DEGRADATION QUANT: CPT | Performed by: EMERGENCY MEDICINE

## 2022-04-12 PROCEDURE — 85004 AUTOMATED DIFF WBC COUNT: CPT | Performed by: EMERGENCY MEDICINE

## 2022-04-12 PROCEDURE — 84484 ASSAY OF TROPONIN QUANT: CPT | Mod: 91 | Performed by: EMERGENCY MEDICINE

## 2022-04-12 PROCEDURE — 83880 ASSAY OF NATRIURETIC PEPTIDE: CPT | Performed by: EMERGENCY MEDICINE

## 2022-04-12 RX ADMIN — SODIUM CHLORIDE 1000 ML: 9 INJECTION, SOLUTION INTRAVENOUS at 20:59

## 2022-04-12 ASSESSMENT — ENCOUNTER SYMPTOMS
ABDOMINAL PAIN: 0
COUGH: 0
BACK PAIN: 0
DIZZINESS: 0
FATIGUE: 1
NECK PAIN: 0
LIGHT-HEADEDNESS: 0
NAUSEA: 0
VOMITING: 0
PALPITATIONS: 1
NECK STIFFNESS: 0
DIARRHEA: 0

## 2022-04-12 NOTE — TELEPHONE ENCOUNTER
See 4/12/22 nurse triage encounter.    Writer responded via [email or web address].    JOSUE TranN, RN  [facility]

## 2022-04-12 NOTE — ED TRIAGE NOTES
ED Triage Provider Note  [facility]  Encounter Date: Apr 12, 2022    History:  Chief Complaint   Patient presents with    Tachycardia     Hailey Stark is a 51 year old female with a history of long covid since 11/2020 who presents to the ED with concern for tachycardia yesterday. Patient reports normally tachycardic while at work up to 120. Yesterday reports HR was in 140s for about 2 hours. At that time she was also having increased chest pressure. Reports typically has chest pressure, however yesterday when HR was faster chest pressure was worse. Was not relieved with rest. In triage today, HR 79. She reports she feels her normal symptoms today: fatigue, dyspnea with exertion, chest pressure.     Review of Systems   ROS: 10 point ROS neg other than the symptoms noted above in the HPI.     Exam:  /82   Pulse 79   Temp 97.4  F (36.3  C) (Oral)   Resp 15   LMP 04/08/2022   SpO2 99%   General: No acute distress. Appears stated age.   Cardio: Regular rate, extremities well perfused  Resp: Normal work of breathing, grossly normal respiratory rate        Medical Decision Making:  Patient arriving to the ED with problem as above. A medical screening exam was performed. No orders initiated from Triage as patient evaluated in [facility]. The patient is appropriate to wait in triage.      MELODIE Mendieta CNP on 4/12/2022 at 5:37 PM

## 2022-04-12 NOTE — TELEPHONE ENCOUNTER
Huddled with available provider, Demetrice Judd NP, who recommended ER evaluation as ADS may not be appropriate with need for cardiac monitoring.     RN called patient and relayed this message, she will be heading to the emergency department.     CELY Montes RN  [facility]

## 2022-04-12 NOTE — ED PROVIDER NOTES
"ED Provider Note  [facility]      History     Chief Complaint   Patient presents with     Tachycardia     The history is provided by the patient and medical records.     Hailey Stark is a 51 year old female with a PMH of pectus excavatum, cardiac dysrhythmia/nonspecific dysrhythmias s/p prior ablation (2000), chronic peptic ulcer, kidney stones, cervical dysplasia, and h/o prior COVID infection (Nov 2020), who presents to the ED today with reports of ongoing intermittent tachycardia and other symptoms since prior Covid infection (with reportedly negative Holter last summer), presenting today with concern potentially having faster heart rates at home and with ongoing feelings of being \"winded\" with constant \"chest pressure\".     PREVIOUS HISTORY  Patient does have a reported history of some sort of cardiac dysrhythmia in the past, for which she reportedly had ablation back in 2000.  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 Reports that since that time she has been having \"cardiac issues\" since at least last summer.    - Had Holter monitor Aug 2021. Report reviewed in EMR: No V. tach, no pauses, no second-degree Mobitz 2 AV block or third-degree AV block, no A. fib, no supraventricular tachycardia. When recorded symptoms in her diarrhea she was either in sinus rhythm or junctional rhythm.   - Echocardiogram in September 2021 had good EF of 55-60%, global RV function normal, normal LV filling pressures, normal IVC size and respiratory variability, no obvious valvular abnormality, and it was unchanged from 2009.  They thought her dyspnea to be unlikely cardiac in origin based on that study  - CT scan of chest (w/o contrast) was negative for acute lung findings, though she was noted to have some pectus excavatum.    Patient was seen by Dr. Encinas in clinic for ongoing shortness of breath, dyspnea on exertion, brain fog and fatigue " after Covid infection.  They reportedly found that her pulmonary work-up had been negative including CXR, spirometry and echo.  The did find abnormal flow-volume loops suggestive of VCD (vocal cord dysfunction), and they recommended repeat testing to assess and that she been evaluated by PM&R which have been helpful.  They recommended to discuss with HR potential for short-term disability and referral to PT and OT and that she will have her follow-up with PM&R.  They recommended repeating spirometry and that she may need referral to SLP to evaluate for vocal cord dysfunction based on her inspiratory and expiratory loops on her PFTs in April 21.  They also discussed talking with therapist regarding stress and anxiety related to post Covid symptoms and were planning on seeing her again for recheck in 2 months.  Was seen by the post Covid clinic on 2/16/2022 they recommend that she take some time off work to recuperate, only gradually increase physical activity, recommended daily meditation and calming activities, not paying attention to symptoms, to get a tetanus booster and have a return visit in 3 months    More recently, patient reprots having been starting on a medication for her thyroid within the last month. Patient appears to have been prescribed 50 mcg of Synthroid on 3/1/2022, and it appears that back in January 2022 they were concerned for subclinical hypothyroidism and planning to get a repeat TSH and noticed after this that they started the Synthroid.    Ever since having Covid back in November 2020, she reports having some constant and ongoing symptoms.  She describes a central chest pressure and heaviness in the center of her chest.  She also reports feeling very fatigued, and that she easily gets winded and tired even with minimal activity.  She also reports intermittent fast heart rate sensations, but reports her heart rate is usually no more than the 120s when going faster.  In discussion with the  patient she reports that she had an ablation back in 2000.  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  Denies having any issues since then until she got COVID and then having the aforementioned symptoms.    CURRENT PRESENTATION  Patient presents today at the recommendation of her usual providers given that she was having faster heart rates yesterday.  Yesterday, she noticed that her heart rates went up to the 130s.  She checked her heart rates on pulse oximeter as well, found that she had normal oxygen saturations on room air at the time, but the heart rates did stay in the 130s for a period of time.  This is above her normal of tachycardia up to the 120s.  There is no lightheadedness, dizziness, syncope or near syncope, but did feel fatigued as usual, continued to have her usual chest pressure, but neither of those were unchanged.  She denies any known recent dehydration or other potential triggers.  The only thing new or change that she can come up with was starting the levothyroxine a month ago.  With this denies any change in blood pressure, no diarrhea, no skin or hair changes, no new headaches, vision changes, ear symptoms, no new neck or back symptoms.  No new types of chest pain, no pleuritic symptoms.  No cough or sputum production.  No new neck or back symptoms.  No abdominal pain.  No nausea or vomiting.  No GI or  symptoms, denies any chance of pregnancy.  No leg pain or swelling, no new extremity symptoms.  Patient confirms nothing new, just had the faster heart rates yesterday.  Today she has not noticed any heart rates that fast.  However, she spoke with her regular care team about the symptoms, and they recommended she come in for further evaluation.    No other new symptoms or complaints this time.  Please see ROS for further  details.      ----------------------------------------------------------------------------------------------------------------------------------------    CT Chest  W/O Contrast  - 1/21/2022  CLINICAL HISTORY: Dyspnea, chronic, unclear etiology; Dyspnea on exertion  COMPARISON: Radiograph 4/29/2021. CT abdomen 10/3/2014  IMPRESSION:   1. No acute finding. Lungs are clear.  2. Pectus excavatum.      ECHOCARDIOGRAM COMPLETE - 9/3/21:  Interpretation Summary  Global and regional left ventricular function is normal with an EF of 55-60%.  Global right ventricular function is normal.  Diastolic Doppler findings (E/E' ratio and/or other parameters) suggest left ventricular filling pressures are normal.  The inferior vena cava was normal in size with preserved respiratory variability.  No significant valvular abnormalities were noted.     This study was compared with the study from 2009 .There has been no change.     Dyspnea unlikely cardiac based on this study.    ----------------------------------------------------------------------------------------------------------------------------------------    Past Medical History  Past Medical History:   Diagnosis Date     Anxiety state, unspecified     Anxiety     Cardiac dysrhythmia, unspecified     Arrhythmia NOS s/p ablation     Chronic peptic ulcer, unspecified site, without mention of hemorrhage, perforation, or obstruction     Ulcer, Peptic     Depressive disorder, not elsewhere classified     Depression (non-psychotic)     Leukorrhea, not specified as infective 11/18/2009    heavy, daily, yellow/green mucoid dischg following retained tampon removal.Tx w flagyl, metrogel, Cleocin, Diflucan, doxycycline, boric acid capsules. 5/16/2011 + GBS.      Menarche age 13    cycles q 30 x 5 d, heavy     Moderate dysplasia of cervix (KENNY II) 1998     Normal Papssince LEEP->routine screening     Past Surgical History:   Procedure Laterality Date     CARDIAC SURGERY  2000    Catheter  ablation     Cervical Conization Loop Electrode Excision  1998    KENNY II  F/U Pap q 3 mo x 2 yrs were all NORMAL     COSMETIC SURGERY  2005& 2006    Liposuction     EYE SURGERY  2006    Lasix     KIDNEY SURGERY  summer 2012    6 kidney stone excision/stent placed     LASIK       ORTHOPEDIC SURGERY  2012    Bunionectomy     SURGICAL HISTORY OF -       lasik     SURGICAL HISTORY OF -       catheter ablation heart atrial fib tx     VASCULAR SURGERY  Feb. 2020    Adhesive ablation     albuterol (PROAIR HFA/PROVENTIL HFA/VENTOLIN HFA) 108 (90 Base) MCG/ACT inhaler  buPROPion (WELLBUTRIN XL) 150 MG 24 hr tablet  fluticasone-salmeterol (ADVAIR) 250-50 MCG/DOSE inhaler  ibuprofen (ADVIL,MOTRIN) 800 MG tablet  levothyroxine (SYNTHROID/LEVOTHROID) 50 MCG tablet  PARoxetine (PAXIL) 20 MG tablet      Allergies   Allergen Reactions     Meloxicam      Tongue swelling     Family History  Family History   Problem Relation Age of Onset     Thyroid Disease Mother         removed     Cancer Paternal Grandmother         stomach cancer     Alzheimer Disease Paternal Grandmother      Depression Sister      Thyroid Disease Sister      Thyroid Disease Sister         on meds     Diabetes Brother      Depression Brother      Diabetes Nephew      Glaucoma No family hx of      Macular Degeneration No family hx of      Social History   Social History     Tobacco Use     Smoking status: Never Smoker     Smokeless tobacco: Never Used   Substance Use Topics     Alcohol use: Not Currently     Comment: 1-2 beers 1-2Xs/mo     Drug use: No      Past medical history, past surgical history, medications, allergies, family history, and social history were reviewed with the patient. No additional pertinent items.       Review of Systems   Constitutional: Positive for fatigue (chronic).   Respiratory: Negative for cough.    Cardiovascular: Positive for chest pain (pressure, chronic) and palpitations (resolved). Negative for leg swelling.   Gastrointestinal:  Negative for abdominal pain, diarrhea, nausea and vomiting.   Genitourinary: Negative.    Musculoskeletal: Negative for back pain, neck pain and neck stiffness.   Skin: Negative.    Neurological: Negative for dizziness, syncope and light-headedness.   All other systems reviewed and are negative.    A complete review of systems was performed with pertinent positives and negatives noted in the HPI, and all other systems negative.    Physical Exam   BP: 135/82  Pulse: 79  Temp: 97.4  F (36.3  C)  Resp: 15  SpO2: 99 %  Physical Exam  CONSTITUTIONAL: Well-developed and well-nourished. Awake and alert. Non-toxic appearance. No acute distress.   HENT:   - Head: Normocephalic and atraumatic.   - Ears: Hearing and external ear grossly normal.   - Nose: Nose normal. No rhinorrhea. No epistaxis.   - Mouth/Throat: MMM  EYES: Conjunctivae and lids are normal. No scleral icterus.   NECK: Normal range of motion and phonation normal. Neck supple.  No tracheal deviation, no stridor. No edema or erythema noted.  CARDIOVASCULAR: Normal rate, regular rhythm and no appreciable abnormal heart sounds. Normal distal pulses are equal bilaterally no obvious asymmetry in any of the extremities.   PULMONARY/CHEST: Normal work of breathing. No accessory muscle usage or stridor. No respiratory distress.  No appreciable abnormal breath sounds.  MUSCULOSKELETAL: Extremities warm and seemingly well perfused. No edema or calf tenderness. No obvious emergency.  NEUROLOGIC: Awake, alert. Not disoriented. She displays no atrophy and no tremor. Normal tone. No seizure activity. GCS 15  SKIN: Skin is warm and dry. No rash noted. No diaphoresis. No pallor.   PSYCHIATRIC: Normal mood and affect. Speech and behavior normal. Thought processes linear. Cognition and memory are normal.     ED Course     ED Course as of 22 0331   e 2022   2203 ORTHOSTATIC VITAL SIGNS:  Layin/87, HR 75  Sittin/84, HR 75  Standin/81, HR 76   2224  "Discussed w/ Cards (EP Attending)            EKG Interpretation:      Interpreted by Lala Vasquez MD  Time reviewed: 1098   Symptoms at time of EKG: tachycardia   Rhythm: Normal sinus   Rate: 71  Axis: Normal  Ectopy: None  Conduction: Normal  ST Segments/ T Waves: Low voltage QRS   Comparison to prior: Slightly flatter T-waves in V2/V3 compared to previous on 8/27/2021 (could have lead positioning component), looks similar to ECG on 11/2020  Clinical Impression: Sinus, generally similar, but slightly flatter twaves in V2/v3, looks more similar to Nov 2020 than Aug 2021     Assessments & Plan (with Medical Decision Making)   IMPRESSION: 51 year old female with a PMH of pectus excavatum, cardiac dysrhythmia/nonspecific dysrhythmias s/p prior ablation (2000), chronic peptic ulcer, kidney stones, cervical dysplasia, and h/o prior COVID infection (Nov 2020), who presents to the ED today with reports of ongoing intermittent tachycardia and other symptoms since prior Covid infection (with reportedly negative Holter last summer), presenting today with concern potentially having faster heart rates at home and with ongoing feelings of being \"winded\" with constant \"chest pressure\".     Clinically, patient appears nontoxic, NAD.  Vitals grossly WNL.  While she had the heart rates up to 130s at home yesterday (and I can see pictures of her monitor at home that indicate this), current heart rate currently in the 70s.  No other acute findings on exam currently.    DDx includes, but not limited to, orthostasis or perhaps some sort of depleted volume picture, which could have contributed to some faster heart rates, potentially response to her recent addition of levothyroxine, less likely occult infection given currently asymptomatic, could consider cardiac or dysrhythmia, also considered PE but no pleuritic symptoms, no hypoxia, but did have fast heart rates that I think it makes her probably above the PERC threshold of risk but " still low pretest probability enough to do a D-dimer (and the CT scan she had before was without contrast), et al.    PLAN: ECG, laboratory studies, chest imaging, monitoring, empiric fluids, disposition pending ED course  - Risks/benefits of pursuing imaging reviewed and accepted.     RESULTS:  - Labs: no acute findings on CMP or CBC  --- Troponin < 3 x2 (and this is in the setting of constant chest pressure)  BNP 77,  (improved from the 4-5 range in January and February of this year)  --- D-dimer negative   - Imaging: Written preliminary reports reviewed. CXR shows no acute findings.   --- Results/reports reviewed w/ patient who expresses understanding of findings and F/U recommendations.    INTERVENTIONS:   - IV fluid    RE-EVALUATION:  - The patient continues to have normal HR. No change in HR w/ check of orthostatics. No new sx's.   - Pt otherwise continues to do well here in the ED, no acute issues or apparent concerning changes in vitals or clinical appearance.    DISCUSSIONS:  - w/ Cardiology (EP) Attending: Reviewed case. They think would be appropriate to discharge the patient at this time and have her F/U in EP clinic. At that appointment then they'd talk to her about options like loop recorders, EP studies/ interventions, etc. No additional recommendations/ requests for us in the ED tonight.   - w/ Patient: I have reviewed the available findings, thoughts from the Cardiology attending. plan, need for close follow up, strict return/safety instructions with the patient. She expressed understanding and agreement with this plan. All questions answered to the best of our ability at this time.     DISPOSITION/PLANNING:  - IMPRESSION: Tachycardia (yesterday, asymptomatic today), multiple symptoms since prior COVID infection, Prior ablation for AV kushal reentrant tachycardia (per pt)   - DISPOSITION: Discharge to home w/ plan for close F/U  - FOLLOW-UP: PCP and Cards EP (urgent ~ 3 days)  -  RECOMMENDATIONS: Conservative symptom management, strict return instructions  - Rx: Discuss meds (especially levothyroxine) w/ PCP and Cards teams.       _____________________________________________________________________        --  Lala Vasquez MD  [facility] EMERGENCY DEPARTMENT  4/12/2022     Lala Vasquez MD  04/13/22 0407

## 2022-04-12 NOTE — ED TRIAGE NOTES
"Pt ambulatory to triage with c/o increase HR. Pt states she's a \"long hauler\", had COVID back in November 2020, and began having cardiac issues last summer where she was given a Holter monitor which came back negative. Per pt states her HR normally in 110s, but now in 130s the past few days. Pt denies any new SOB, but states she just feels \"winded\", and endorses constant \"chest pressure\" unchanged per her baseline. A&Ox, VSS on RA (HR 80), denies pain.   "

## 2022-04-12 NOTE — TELEPHONE ENCOUNTER
"Nurse Triage SBAR    Is this a 2nd Level Triage? YES, LICENSED PRACTITIONER REVIEW IS REQUIRED    Situation: racing heart rate with walking     Background: patient is \"covid long hauler\" and reports yesterday walking in [place] she had more chest tightness than normal, typical shortness of breath, and when she checked her oximeter sats 97-98% but heart rate 139-140. At rest heart rate is 110-114. Patient has history of AV node ablation in 2000. Patient stable now, no distress.     Assessment: ADS referral.     Protocol Recommended Disposition:   Go To ED/UCC Now (Or To Office With PCP Approval)    Recommendation: disposition recommendation.      Routed to provider    Does the patient meet one of the following criteria for ADS visit consideration? 16+ years old, with an [facility] PCP     TIP  Providers, please consider if this condition is appropriate for management at one of our Acute and Diagnostic Services sites.     If patient is a good candidate, please use dotphrase <dot>triageresponse and select Refer to ADS to document.      Patient returning call. Patient is \"covid long stephan\". Patient reports that yesterday at work walking down the hallway her shortness of breath was worse but oxygen levels were 97-98% and heart rate was 139-140.  No cough. Patient wore a Zio patch last summer for 14 days. Normally heart rate is 110-114 at rest. Patient reports that she \"always has chest pressure, it is part of my long haulers\". Patient is at work because her employer is fighting her leave of absence and making her see one of their doctors on 4/25. Patient reports she has history of AV node ablation in 2000.     Reason for Disposition    New or worsened shortness of breath with activity (dyspnea on exertion)    Additional Information    Negative: Passed out (i.e., fainted, collapsed and was not responding)    Negative: Shock suspected (e.g., cold/pale/clammy skin, too weak to stand, low BP, rapid pulse)    Negative: Difficult to " awaken or acting confused (e.g., disoriented, slurred speech)    Negative: Visible sweat on face or sweat dripping down face    Negative: Unable to walk, or can only walk with assistance (e.g., requires support)    Negative: Received SHOCK from implantable cardiac defibrillator and has persisting symptoms (i.e., palpitations, lightheadedness)    Negative: Dizziness, lightheadedness, or weakness and heart beating very rapidly (e.g., > 140 / minute)    Negative: Dizziness, lightheadedness, or weakness and heart beating very slowly (e.g., < 50 / minute)    Negative: Sounds like a life-threatening emergency to the triager    Negative: Chest pain    Negative: Difficulty breathing    Negative: Dizziness, lightheadedness, or weakness    Negative: Heart beating very rapidly (e.g., > 140 / minute) and present now (Exception: during exercise)    Negative: Heart beating very slowly (e.g., < 50 / minute) (Exception: athlete)    Protocols used: HEART RATE AND HEARTBEAT [id number]

## 2022-04-12 NOTE — TELEPHONE ENCOUNTER
"See 4/11/22 MyChart encounter.    \"My heart rate has been elevated more than usual. I'm still short of breathe ,fatigue and pressure in chest as usual. Today at work my heart rate in 130s and 140. My O2 levels are always normal.\"      Left message to call back and ask to speak with an available triage nurse.  JOSUE TranN, RN  [facility]        "

## 2022-04-13 ENCOUNTER — PATIENT OUTREACH (OUTPATIENT)
Dept: FAMILY MEDICINE | Facility: CLINIC | Age: 52
End: 2022-04-13
Payer: COMMERCIAL

## 2022-04-13 NOTE — TELEPHONE ENCOUNTER
"ED/Discharge Protocol    \"Hi, my name is Vitcorina, a registered nurse, and I am calling on behalf of Nacho Lara's office at [place].  I am calling to follow up and see how things are going for you after your recent visit.\"    \"I see that you were in the ER last night.    How are you doing now that you are home?\"      Doing fine, went to ER for fast heart rate and they referred her to cardiology. She has an appt scheduled for Monday. They also checked her thyroid levels.    Is patient experiencing symptoms that may require a hospital visit?  no    Discharge Instructions    \"Let's review your discharge instructions.  What is/are the follow-up recommendations?  Pt. Response: Make appt with cardiology    \"Were you instructed to make a follow-up appointment?\"  Pt. Response: No.       \"When you see the provider, I would recommend that you bring your discharge instructions with you.    Medications    \"How many new medications are you on since your hospitalization/ED visit?\"    0  \"How many of your current medicines changed (dose, timing, name, etc.) while you were in the hospital/ED visit?\"   0  \"Do you have questions about your medications?\"   No  \"Were you newly diagnosed with heart failure, COPD, diabetes or did you have a heart attack?\"   No  Post Discharge Medication Reconciliation Status: discharge medications reconciled, continue medications without change.    Was MTM referral placed (*Make sure to put transitions as reason for referral)?   No    Call Summary    \"Do you have any questions or concerns about your condition or care plan at the moment?\"    No  Triage nurse advice given: none    \"If you have questions or things don't continue to improve, we encourage you contact us through the main clinic number,  [phone].  Even if the clinic is not open, triage nurses are available 24/7 to help you.     We would like you to know that our clinic has extended hours (provide information).  We also have urgent care " "(provide details on closest location and hours/contact info)\"      \"Thank you for your time and take care!\"    Victorina Glover RN  [facility]  "

## 2022-04-13 NOTE — ED NOTES
Assumed care at this time. Pt resting in room without complaint. States she is without symptoms while at rest. Lab at bedside obtaining blood per order at this time. VSS. Will continue to monitor and assess. Call light in hand.

## 2022-04-13 NOTE — DISCHARGE INSTRUCTIONS
TODAY'S VISIT:  You were seen today for having had fast heart rates yesterday, in the setting of multiple other symptoms since having a prior COVID infection.   -The cause of your symptoms is not yet known.  Therefore, we discussed your case with the Cardiology team who believes you can be discharged but would recommend you get seen in the Cardiology EP (electrophysiology) clinic for further evaluation and management, so they can discuss different testing/intervention options with you.  They are supposed to call you to arrange this appointment, but you can also call the number listed below to try to set this up for soon as possible (ideally within the next few days).  - In the meantime, please also work with your Primary Care team, discuss your medications, etc. You should discuss all imaging/radiology tests and laboratory tests that were performed during this visit with your usual providers to ensure you continue to improve and do not need any further evaluation, testing or management.   - Please call your Primary Care team and the Cardiology (EP / Electrophysiology) clinic to discuss and arrange a follow-up appointment.   - Immediately return to the nearest Emergency Department with any new or worsening symptoms or any concerns.    FOLLOW-UP:  Please make an appointment to follow up with:  - Your Primary Care Provider and Cardiology Clinic / Electrophysiology (EP) Clinic (phone: [phone]), call tomorrow to be seen within the next few days for a recheck and further evaluation/management.  - Immediately return to the nearest Emergency Department with any new or worsening symptoms or any concerns.  - If you do not have a primary care provider, you can be seen in follow-up and establish care with one of our providers by calling of the the clinics below:  --- [facility] (phone: [phone])  --- Primary Care / [facility] (phone: [phone])   - Have your provider review the results  from today's visit with you again to make sure no further follow-up or additional testing is needed based on those results.     PRESCRIPTIONS / MEDICATIONS:  - Discuss your thyroid medication and its dosing with your Primary Care provider as well as the Electrophysiology (EP) Cardiologist in case is playing a role with your symptoms.    OTHER INSTRUCTIONS:  - Do your best to stay hydrated.    RETURN TO THE EMERGENCY DEPARTMENT  Return to the Emergency Department immediately for any new or worsening symptoms or any concerns.     Remember that you can always come back to the Emergency Department if you are not able to see your regular doctor in the amount of time listed above, if you get any new symptoms, or if there is anything that worries you.

## 2022-04-14 ENCOUNTER — PATIENT OUTREACH (OUTPATIENT)
Dept: FAMILY MEDICINE | Facility: CLINIC | Age: 52
End: 2022-04-14
Payer: COMMERCIAL

## 2022-04-14 LAB
ATRIAL RATE - MUSE: 71 BPM
DIASTOLIC BLOOD PRESSURE - MUSE: NORMAL MMHG
INTERPRETATION ECG - MUSE: NORMAL
P AXIS - MUSE: -22 DEGREES
PR INTERVAL - MUSE: 138 MS
QRS DURATION - MUSE: 84 MS
QT - MUSE: 394 MS
QTC - MUSE: 428 MS
R AXIS - MUSE: -4 DEGREES
SYSTOLIC BLOOD PRESSURE - MUSE: NORMAL MMHG
T AXIS - MUSE: 2 DEGREES
VENTRICULAR RATE- MUSE: 71 BPM

## 2022-04-14 NOTE — TELEPHONE ENCOUNTER
RECORDS RECEIVED FROM: Internal   DATE RECEIVED: 4.18.22   NOTES STATUS DETAILS   OFFICE NOTE from referring provider    Internal 4.12.22 STEPHANIE Vasquez [facility] ED   OFFICE NOTE from other cardiologist        DISCHARGE SUMMARY from hospital        DISCHARGE REPORT from the ER   Internal 4.12.22 STEPHANIE Vasquez [facility] ED   OPERATIVE REPORT     Ablation greater than 20 years ago   MEDICATION LIST   Internal    LABS     BMP       CBC   Internal 1.31.22   CMP   Internal 4.12.22   Lipids   Internal 1.31.22   TSH   Internal 4.12.22   DIAGNOSTIC PROCEDURES     EKG   Internal 4.12.22  8.27.21  11.19.2   Monitor Reports   Internal 8.27.21   IMAGING (DISC & REPORT)      Echo   Internal 9.3.21   Stress Tests       Cath       MRI/MRA       CT/CTA           *ablation greater than 20 years ago.

## 2022-04-14 NOTE — TELEPHONE ENCOUNTER
ED on 4/13/22 for tachycardia    Triage RN team,    Please call pt for E/D visit Hospital discharge  follow up      Radha Eastman, RN, BSN  [facility]

## 2022-04-18 ENCOUNTER — TELEPHONE (OUTPATIENT)
Dept: OTOLARYNGOLOGY | Facility: CLINIC | Age: 52
End: 2022-04-18
Payer: COMMERCIAL

## 2022-04-18 ENCOUNTER — ANCILLARY PROCEDURE (OUTPATIENT)
Dept: CARDIOLOGY | Facility: CLINIC | Age: 52
End: 2022-04-18
Attending: INTERNAL MEDICINE
Payer: COMMERCIAL

## 2022-04-18 ENCOUNTER — PRE VISIT (OUTPATIENT)
Dept: CARDIOLOGY | Facility: CLINIC | Age: 52
End: 2022-04-18
Payer: COMMERCIAL

## 2022-04-18 ENCOUNTER — OFFICE VISIT (OUTPATIENT)
Dept: CARDIOLOGY | Facility: CLINIC | Age: 52
End: 2022-04-18
Attending: INTERNAL MEDICINE
Payer: OTHER MISCELLANEOUS

## 2022-04-18 VITALS
OXYGEN SATURATION: 99 % | HEIGHT: 70 IN | HEART RATE: 80 BPM | WEIGHT: 186 LBS | DIASTOLIC BLOOD PRESSURE: 80 MMHG | SYSTOLIC BLOOD PRESSURE: 132 MMHG | BODY MASS INDEX: 26.63 KG/M2

## 2022-04-18 DIAGNOSIS — R00.0 TACHYCARDIA: Primary | ICD-10-CM

## 2022-04-18 DIAGNOSIS — Z98.890 H/O PRIOR ABLATION TREATMENT: ICD-10-CM

## 2022-04-18 DIAGNOSIS — R00.0 TACHYCARDIA: ICD-10-CM

## 2022-04-18 DIAGNOSIS — R00.0 HEART RATE FAST: ICD-10-CM

## 2022-04-18 PROCEDURE — 93248 EXT ECG>7D<15D REV&INTERPJ: CPT | Mod: 59 | Performed by: INTERNAL MEDICINE

## 2022-04-18 PROCEDURE — 99215 OFFICE O/P EST HI 40 MIN: CPT | Mod: 25 | Performed by: INTERNAL MEDICINE

## 2022-04-18 PROCEDURE — 93005 ELECTROCARDIOGRAM TRACING: CPT

## 2022-04-18 PROCEDURE — G0463 HOSPITAL OUTPT CLINIC VISIT: HCPCS | Mod: 25

## 2022-04-18 PROCEDURE — 93246 EXT ECG>7D<15D RECORDING: CPT

## 2022-04-18 ASSESSMENT — PAIN SCALES - GENERAL: PAINLEVEL: MODERATE PAIN (4)

## 2022-04-18 NOTE — LETTER
4/18/2022      RE: Hailey Stark  [address]  [place] [facility] [address]       Dear Colleague,    Thank you for the opportunity to participate in the care of your patient, Hailey Stark, at the [facility] [place] [facility]. Please see a copy of my visit note below.    I am delighted to see Ms Stark as a new patient in cardiology clinic for evaluation of     History of Present Illness:  Hailey Stark is a 51 year old female with a PMH of pectus excavatum, AVNRT s/p prior ablation (2000) in [facility] in , chronic peptic ulcer, kidney stones, cervical dysplasia, and h/o prior COVID infection (Nov 2020), who was complaining of palpitation and tachycardia since her COVID infection in 2020, she visited cardiologist Jared Barreto who asked her to do a ZIO (08/2021) which showed few runs of junctional beats, no other phil or tachyarrhythmia. So since then she has been having palpitation with a HR that goes up to 120s however recently in 4/12/22 she felt palpitation got her pulse at home was up to 139, she was advised to go to the ER, in the ER her HR was 70s and she was asymptomatic, was given an appointment to come to EP clinic.   - Had Holter monitor Aug 2021. Report reviewed in EMR: No V. tach, no pauses, no second-degree Mobitz 2 AV block or third-degree AV block, no A. fib, no supraventricular tachycardia. When recorded symptoms in her diarrhea she was either in sinus rhythm or junctional rhythm.   - Echocardiogram in September 2021 had good EF of 55-60%, global RV function normal, normal LV filling pressures, normal IVC size and respiratory variability, no obvious valvular abnormality, and it was unchanged from 2009.  They thought her dyspnea to be unlikely cardiac in origin based on that study.  - EKG on 4/12/22: inverted P wave seen in lead 3 and AVF.     Today she has no new complains, she always feels pressure on her chest and sob  on exertion. Has done an EKG sinus at 68, with P wave are upright in inferior leads.    Past Medical History:  - AVNRT s/p ablation in 2000 in [facility] by Dr Ruy Gomes.  - Chronic peptic ulcer, kidney stones, cervical dysplasia, and h/o prior COVID infection (Nov 2020),     Medications:   -Albuterol (PROAIR HFA/PROVENTIL HFA/VENTOLIN HFA) 108 (90 Base) MCG/ACT inhaler  buPROPion (WELLBUTRIN XL) 150 MG 24 hr tablet  fluticasone-salmeterol (ADVAIR) 250-50 MCG/DOSE inhaler  ibuprofen (ADVIL,MOTRIN) 800 MG tablet  levothyroxine (SYNTHROID/LEVOTHROID) 50 MCG tablet  PARoxetine (PAXIL) 20 MG tablet    Allergies: Meloxicam    Never smoked does not use abuse alcohol or recreational drugs.   Pertinent review of systems in additional to listed in HPI:  Review of Systems   Constitutional: Positive for fatigue (chronic).   Respiratory: Negative for cough.    Cardiovascular: Positive for chest pain (pressure, chronic) and palpitations (on excertion). Negative for leg swelling.   Gastrointestinal: Negative for abdominal pain, diarrhea, nausea and vomiting.   Genitourinary: Negative.    Musculoskeletal: Negative for back pain, neck pain and neck stiffness.   Skin: Negative.    Neurological: Negative for dizziness, syncope and light-headedness.   All other systems reviewed and are negative.         Physical examination  Vitals: P: 68 BP:132/80  Constitutional: In general, the patient is a pleasant in no acute distress.    Cardiovascular: Carotids +2/2 bilaterally.  No jugular venous distension. Regular rate and rhythm. Normal S1, S2. No murmur, rub, click, or gallop.   Extremities: Pulses are normal bilaterally throughout. No peripheral edema.  Respiratory: Clear to asculation.  No ronchi, wheezes, rales.  No dullness to percussion.       I have personally and independently reviewed the following:  Labs: Reviewed    Echo: 9/3/2021  Interpretation Summary  Global and regional left ventricular function is normal with an EF  of 55-60%.  Global right ventricular function is normal.  Diastolic Doppler findings (E/E' ratio and/or other parameters) suggest left  ventricular filling pressures are normal.  The inferior vena cava was normal in size with preserved respiratory  variability.  No significant valvular abnormalities were noted.     This study was compared with the study from 2009 .There has been no change.    Angiogram: none    Stress test: none    EKG: sinus at 68, normal EKG.     Patch monitor:   Had Holter monitor Aug 2021. Report reviewed in EMR: No V. tach, no pauses, no second-degree Mobitz 2 AV block or third-degree AV block, no A. fib, no supraventricular tachycardia. When recorded symptoms in her diarrhea she was either in sinus rhythm or junctional rhythm.       Assessment :  Hailey Stark is a 51 year old female with a PMH of pectus excavatum, AVNRT s/p prior ablation (2000) in [facility] in , chronic peptic ulcer, kidney stones, cervical dysplasia, and h/o prior COVID infection (Nov 2020), who was complaining of palpitation and tachycardia since her COVID infection in 2020, she visited cardiologist Jared Barreto who asked her to do a ZIO (08/2021) which showed few runs of junctional beats, no other phil or tachyarrhythmia. So since then she has been having palpitation with a HR that goes up to 120s however recently in 4/12/22 she felt palpitation got her pulse at home was up to 139, she was advised to go to the ER, in the ER her HR was 70s and she was asymptomatic, was given an appointment to come to EP clinic.   - Had Holter monitor Aug 2021. Report reviewed in EMR: No V. tach, no pauses, no second-degree Mobitz 2 AV block or third-degree AV block, no A. fib, no supraventricular tachycardia. When recorded symptoms in her diarrhea she was either in sinus rhythm or junctional rhythm.   - Echocardiogram in September 2021 had good EF of 55-60%, global RV function normal, normal LV filling pressures, normal IVC size  and respiratory variability, no obvious valvular abnormality, and it was unchanged from 2009.  They thought her dyspnea to be unlikely cardiac in origin based on that study.  - EKG on 4/12/22: inverted P wave seen in lead 3 and AVF.     Today she has no new complains, she always feels pressure on her chest and sob on exertion. Has done an EKG sinus at 68, with P wave are upright in inferior leads.    #- Palpitation:  - Pt has palpitation HR goes up to 140, unclear what rhythm or QRS morphology of tachycardia, no EKG seen with tachycardia, no demonstration on the old ZIO (done on 8/2021) except for short Run of possible junctional beats which does not explain the symptoms, also the EKG seen with inverted T wave in 3 and AVF on (4/12/21) could be a variant of normal.   - Differential include sinus tachycardia vs possible SVT.  - Plan: will repeat echo and zio patch for 14 days and will f/u in 1 month.     Plan discussed with Dr Fried.     I spent a total of 30 minutes face to face with Pt during today's office visit. I have spend an additional 30 minutes today on chart review and documentation.    (Level 5 - 60 minutes; level 4 - 45 minutes)    The patient is to return as above . The patient understood the treatment plan as outlined above.  There were no barriers to learning.    Natalia Lozoya MD  EP fellow.     Addendum:  I saw and evaluated the patient and agree with the fellow s finding and plans as written.  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 The last TTE and Zio patch in 8/2021 were WNL. But, we need to repeat them.  Radha Fried MD

## 2022-04-18 NOTE — PROGRESS NOTES
I am delighted to see Ms Stark as a new patient in cardiology clinic for evaluation of     History of Present Illness:  Hailey Stark is a 51 year old female with a PMH of pectus excavatum, AVNRT s/p prior ablation (2000) in [facility] in , chronic peptic ulcer, kidney stones, cervical dysplasia, and h/o prior COVID infection (Nov 2020), who was complaining of palpitation and tachycardia since her COVID infection in 2020, she visited cardiologist Jared Barreto who asked her to do a ZIO (08/2021) which showed few runs of junctional beats, no other phil or tachyarrhythmia. So since then she has been having palpitation with a HR that goes up to 120s however recently in 4/12/22 she felt palpitation got her pulse at home was up to 139, she was advised to go to the ER, in the ER her HR was 70s and she was asymptomatic, was given an appointment to come to EP clinic.   - Had Holter monitor Aug 2021. Report reviewed in EMR: No V. tach, no pauses, no second-degree Mobitz 2 AV block or third-degree AV block, no A. fib, no supraventricular tachycardia. When recorded symptoms in her diarrhea she was either in sinus rhythm or junctional rhythm.   - Echocardiogram in September 2021 had good EF of 55-60%, global RV function normal, normal LV filling pressures, normal IVC size and respiratory variability, no obvious valvular abnormality, and it was unchanged from 2009.  They thought her dyspnea to be unlikely cardiac in origin based on that study.  - EKG on 4/12/22: inverted P wave seen in lead 3 and AVF.     Today she has no new complains, she always feels pressure on her chest and sob on exertion. Has done an EKG sinus at 68, with P wave are upright in inferior leads.    Past Medical History:  - AVNRT s/p ablation in 2000 in [facility] by Dr Ruy Gomes.  - Chronic peptic ulcer, kidney stones, cervical dysplasia, and h/o prior COVID infection (Nov 2020),     Medications:   -Albuterol (PROAIR HFA/PROVENTIL  HFA/VENTOLIN HFA) 108 (90 Base) MCG/ACT inhaler  buPROPion (WELLBUTRIN XL) 150 MG 24 hr tablet  fluticasone-salmeterol (ADVAIR) 250-50 MCG/DOSE inhaler  ibuprofen (ADVIL,MOTRIN) 800 MG tablet  levothyroxine (SYNTHROID/LEVOTHROID) 50 MCG tablet  PARoxetine (PAXIL) 20 MG tablet    Allergies: Meloxicam    Never smoked does not use abuse alcohol or recreational drugs.   Pertinent review of systems in additional to listed in HPI:  Review of Systems   Constitutional: Positive for fatigue (chronic).   Respiratory: Negative for cough.    Cardiovascular: Positive for chest pain (pressure, chronic) and palpitations (on excertion). Negative for leg swelling.   Gastrointestinal: Negative for abdominal pain, diarrhea, nausea and vomiting.   Genitourinary: Negative.    Musculoskeletal: Negative for back pain, neck pain and neck stiffness.   Skin: Negative.    Neurological: Negative for dizziness, syncope and light-headedness.   All other systems reviewed and are negative.         Physical examination  Vitals: P: 68 BP:132/80  Constitutional: In general, the patient is a pleasant in no acute distress.    Cardiovascular: Carotids +2/2 bilaterally.  No jugular venous distension. Regular rate and rhythm. Normal S1, S2. No murmur, rub, click, or gallop.   Extremities: Pulses are normal bilaterally throughout. No peripheral edema.  Respiratory: Clear to asculation.  No ronchi, wheezes, rales.  No dullness to percussion.       I have personally and independently reviewed the following:  Labs: Reviewed    Echo: 9/3/2021  Interpretation Summary  Global and regional left ventricular function is normal with an EF of 55-60%.  Global right ventricular function is normal.  Diastolic Doppler findings (E/E' ratio and/or other parameters) suggest left  ventricular filling pressures are normal.  The inferior vena cava was normal in size with preserved respiratory  variability.  No significant valvular abnormalities were noted.     This study was  compared with the study from 2009 .There has been no change.    Angiogram: none    Stress test: none    EKG: sinus at 68, normal EKG.     Patch monitor:   Had Holter monitor Aug 2021. Report reviewed in EMR: No V. tach, no pauses, no second-degree Mobitz 2 AV block or third-degree AV block, no A. fib, no supraventricular tachycardia. When recorded symptoms in her diarrhea she was either in sinus rhythm or junctional rhythm.       Assessment :  Hailey Stark is a 51 year old female with a PMH of pectus excavatum, AVNRT s/p prior ablation (2000) in [facility] in , chronic peptic ulcer, kidney stones, cervical dysplasia, and h/o prior COVID infection (Nov 2020), who was complaining of palpitation and tachycardia since her COVID infection in 2020, she visited cardiologist Jared Barreto who asked her to do a ZIO (08/2021) which showed few runs of junctional beats, no other phil or tachyarrhythmia. So since then she has been having palpitation with a HR that goes up to 120s however recently in 4/12/22 she felt palpitation got her pulse at home was up to 139, she was advised to go to the ER, in the ER her HR was 70s and she was asymptomatic, was given an appointment to come to EP clinic.   - Had Holter monitor Aug 2021. Report reviewed in EMR: No V. tach, no pauses, no second-degree Mobitz 2 AV block or third-degree AV block, no A. fib, no supraventricular tachycardia. When recorded symptoms in her diarrhea she was either in sinus rhythm or junctional rhythm.   - Echocardiogram in September 2021 had good EF of 55-60%, global RV function normal, normal LV filling pressures, normal IVC size and respiratory variability, no obvious valvular abnormality, and it was unchanged from 2009.  They thought her dyspnea to be unlikely cardiac in origin based on that study.  - EKG on 4/12/22: inverted P wave seen in lead 3 and AVF.     Today she has no new complains, she always feels pressure on her chest and sob on exertion. Has  done an EKG sinus at 68, with P wave are upright in inferior leads.    #- Palpitation:  - Pt has palpitation HR goes up to 140, unclear what rhythm or QRS morphology of tachycardia, no EKG seen with tachycardia, no demonstration on the old ZIO (done on 8/2021) except for short Run of possible junctional beats which does not explain the symptoms, also the EKG seen with inverted T wave in 3 and AVF on (4/12/21) could be a variant of normal.   - Differential include sinus tachycardia vs possible SVT.  - Plan: will repeat echo and zio patch for 14 days and will f/u in 1 month.     Plan discussed with Dr Fried.     I spent a total of 30 minutes face to face with Pt during today's office visit. I have spend an additional 30 minutes today on chart review and documentation.    (Level 5 - 60 minutes; level 4 - 45 minutes)    The patient is to return as above . The patient understood the treatment plan as outlined above.  There were no barriers to learning.    aNtalia Lozoya MD  EP fellow.     Addendum:  I saw and evaluated the patient and agree with the fellow s finding and plans as written.  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 The last TTE and Zio patch in 8/2021 were WNL. But, we need to repeat them.  Radha Fried MD

## 2022-04-18 NOTE — PATIENT INSTRUCTIONS
You were seen in the Electrophysiology Clinic today by: Dr Fride    Plan:     Labs/Tests Needed:  14 day zio patch heart monitor  echocardiogram    Follow up visit:  1 month with Dr Fried to review results        Your Care Team:  EP Cardiology   Telephone Number     Nurse Line  Marlene Olsen RN  [phone]     For scheduling appts or procedures:    Sraai Ashley   [phone]   For the Device Clinic (Pacemakers, ICDs, Loop Recorders)    During business hours: [phone]  After business hours:   [phone]- select option 4 and ask for job code [id number].     On-call cardiologist for after hours or on weekends: [phone], option #4, and ask to speak to the on-call cardiologist.     Cardiovascular Clinic:   [address] [place] [address]. [place] [address]      As always, Thank you for trusting us with your health care needs!

## 2022-04-18 NOTE — NURSING NOTE
Chief Complaint   Patient presents with     New Patient       NEW- tachycardia , history of ablation, hospital follow up 4/12       Vitals were taken and medications were reconciled. EKG was performed   AMARI Shane  3:30 PM

## 2022-04-18 NOTE — PROGRESS NOTES
"Per Dr. Fried, patient to have Zio monitor placed.  Diagnosis: Tachycardia [R00.0]  Monitor placed: {YES / NO:311222::\"Yes\"}  Patient Instructed: {YES / NO:619662::\"Yes\"}  Patient verbalized understanding: {YES / NO:560477::\"Yes\"}  Holter # [id number]      "

## 2022-04-18 NOTE — TELEPHONE ENCOUNTER
Writer contacted patient to confirm appointment with Dr. Wheeler 4/19/22 8:00 AM. Patient confirmed.    Fortino Blum, EMT

## 2022-04-19 ENCOUNTER — OFFICE VISIT (OUTPATIENT)
Dept: OTOLARYNGOLOGY | Facility: CLINIC | Age: 52
End: 2022-04-19
Payer: COMMERCIAL

## 2022-04-19 ENCOUNTER — PRE VISIT (OUTPATIENT)
Dept: OTOLARYNGOLOGY | Facility: CLINIC | Age: 52
End: 2022-04-19

## 2022-04-19 ENCOUNTER — OFFICE VISIT (OUTPATIENT)
Dept: OTOLARYNGOLOGY | Facility: CLINIC | Age: 52
End: 2022-04-19

## 2022-04-19 VITALS — HEIGHT: 69 IN | BODY MASS INDEX: 27.55 KG/M2 | WEIGHT: 186 LBS

## 2022-04-19 DIAGNOSIS — R49.0 DYSPHONIA: Primary | ICD-10-CM

## 2022-04-19 LAB
ATRIAL RATE - MUSE: 68 BPM
DIASTOLIC BLOOD PRESSURE - MUSE: NORMAL MMHG
INTERPRETATION ECG - MUSE: NORMAL
P AXIS - MUSE: 56 DEGREES
PR INTERVAL - MUSE: 148 MS
QRS DURATION - MUSE: 84 MS
QT - MUSE: 376 MS
QTC - MUSE: 399 MS
R AXIS - MUSE: -3 DEGREES
SYSTOLIC BLOOD PRESSURE - MUSE: NORMAL MMHG
T AXIS - MUSE: 12 DEGREES
VENTRICULAR RATE- MUSE: 68 BPM

## 2022-04-19 PROCEDURE — 99204 OFFICE O/P NEW MOD 45 MIN: CPT | Mod: 25 | Performed by: OTOLARYNGOLOGY

## 2022-04-19 PROCEDURE — 92524 BEHAVRAL QUALIT ANALYS VOICE: CPT | Mod: GN | Performed by: SPEECH-LANGUAGE PATHOLOGIST

## 2022-04-19 PROCEDURE — 31575 DIAGNOSTIC LARYNGOSCOPY: CPT | Performed by: OTOLARYNGOLOGY

## 2022-04-19 ASSESSMENT — PAIN SCALES - GENERAL: PAINLEVEL: MODERATE PAIN (4)

## 2022-04-19 NOTE — PATIENT INSTRUCTIONS
1.  You were seen in the ENT Clinic today by Dr. Wheeler.     2. Your next steps:   - Schedule voice/ breathing therapy   - Follow up with otology provider    3.  Plan is to return to clinic as needed after therapy.    If you have any questions or concerns after your appointment, please call the clinic:   - Clinic phone: [phone].       Clary Cat RN, BSN  [facility]  Department of Otolaryngology  [facility]  [email or web address]

## 2022-04-19 NOTE — NURSING NOTE
"Chief Complaint   Patient presents with     Consult     New patient       Height 1.753 m (5' 9\"), weight 84.4 kg (186 lb), last menstrual period 04/08/2022, not currently breastfeeding.    Fortino Blum, EMT  "

## 2022-04-19 NOTE — PROGRESS NOTES
[facility]   at the [facility]   Otolaryngology Clinic     Patient: Hailey Stark    MRN: [id number]    : 1970    Age/Gender: 51 year old female  Date of Service: 2022  Rendering Provider:   Michelle Wheeler MD     Referring Provider   PCP: Nacho Lara  Referring Physician: Riaz Keller MD  [address] [place] [id number]  Reason for Consultation   Dyspnea  History   HISTORY OF PRESENT ILLNESS: I was asked to consult on Hailey Stark, by Dr. Keller for evaluation of dyspnea . Ms. Stark is a 51 year old female who presents to us today for consultation.      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     she presents today for evaluation. she reports:    - currently wearing a heart monitor to evaluate palpitations since COVID.  - ears feel plugged  - can have difficulty hearing others, especially low voices    Dysphonia  - high voice use at nursing home  - unsure of voice changes  - pain with talking no  - feels winded with talking  - no straining to talk  - can feel dry with talking  - does not sing  - laughing is ok for her       Dysphagia  - denies      Dyspnea  - went back to work after COVID  - felt dyspnea  - tried inhalers without relief  - had exercised induced asthma with activity  - has difficulty talking to patients who are [place] in the nursing home  - has to get closer to them  - has to redo her PFT test in   - feels chest heaviness and tachycardia  - had ablation in  for tachycardia  - initially improved her symptoms but then returned after COVID  -  while walking  - went to the ER last week  - saw cardiology yesterday to establish care  - dyspnea with talking, walking, stairs, and walking at inclines  - no stridor  - feels that she 'cannot catch up with her air'  - wakes up gasping for breath, then 'catches up' with her breath  - does not cough when she wakes  up  - is told she snores  - no sleep apnea  - people look at her when she is winded  - takes breaths in between talking  - no neck surgeries  - no intubation      Throat clearing/cough  - denies      GERD/LPRD   - used to have this  - dry throat in the morning  - no sore throat in the morning    PAST MEDICAL HISTORY:   Past Medical History:   Diagnosis Date     Anxiety state, unspecified     Anxiety     Cardiac dysrhythmia, unspecified     Arrhythmia NOS s/p ablation     Chronic peptic ulcer, unspecified site, without mention of hemorrhage, perforation, or obstruction     Ulcer, Peptic     Depressive disorder, not elsewhere classified     Depression (non-psychotic)     Leukorrhea, not specified as infective 11/18/2009    heavy, daily, yellow/green mucoid dischg following retained tampon removal.Tx w flagyl, metrogel, Cleocin, Diflucan, doxycycline, boric acid capsules. 5/16/2011 + GBS.      Menarche age 13    cycles q 30 x 5 d, heavy     Moderate dysplasia of cervix (KENNY II) 1998     Normal Papssince LEEP->routine screening     PAST SURGICAL HISTORY:   Past Surgical History:   Procedure Laterality Date     CARDIAC SURGERY  2000    Catheter ablation     Cervical Conization Loop Electrode Excision  1998    KENNY II  F/U Pap q 3 mo x 2 yrs were all NORMAL     COSMETIC SURGERY  2005& 2006    Liposuction     EYE SURGERY  2006    Lasix     KIDNEY SURGERY  summer 2012    6 kidney stone excision/stent placed     LASIK       ORTHOPEDIC SURGERY  2012    Bunionectomy     SURGICAL HISTORY OF -       lasik     SURGICAL HISTORY OF -       catheter ablation heart atrial fib tx     VASCULAR SURGERY  Feb. 2020    Adhesive ablation     CURRENT MEDICATIONS:   Current Outpatient Medications:      albuterol (PROAIR HFA/PROVENTIL HFA/VENTOLIN HFA) 108 (90 Base) MCG/ACT inhaler, Inhale 2 puffs into the lungs every 6 hours, Disp: 18 g, Rfl: 1     buPROPion (WELLBUTRIN XL) 150 MG 24 hr tablet, Take 1 tablet (150 mg) by mouth every morning,  Disp: 90 tablet, Rfl: 3     fluticasone-salmeterol (ADVAIR) 250-50 MCG/DOSE inhaler, Inhale 1 puff into the lungs every 12 hours, Disp: 2 each, Rfl: 1     ibuprofen (ADVIL,MOTRIN) 800 MG tablet, Take 1 tablet (800 mg) by mouth every 8 hours as needed for moderate pain, Disp: 30 tablet, Rfl: 0     levothyroxine (SYNTHROID/LEVOTHROID) 50 MCG tablet, Take 1 tablet (50 mcg) by mouth daily, Disp: 56 tablet, Rfl: 0     PARoxetine (PAXIL) 20 MG tablet, Take 1 tablet (20 mg) by mouth every morning, Disp: 30 tablet, Rfl: 1    ALLERGIES: Meloxicam    SOCIAL HISTORY:    Social History     Socioeconomic History     Marital status: Single     Spouse name: Not on file     Number of children: Not on file     Years of education: Not on file     Highest education level: Not on file   Occupational History     Not on file   Tobacco Use     Smoking status: Never Smoker     Smokeless tobacco: Never Used   Substance and Sexual Activity     Alcohol use: Not Currently     Comment: 1-2 beers 1-2Xs/mo     Drug use: No     Sexual activity: Yes     Partners: Male     Birth control/protection: None   Other Topics Concern     Parent/sibling w/ CABG, MI or angioplasty before 65F 55M? No      Service No     Blood Transfusions No     Caffeine Concern No     Comment: 2 s/d     Occupational Exposure Yes     Comment: LPN at  Center     Hobby Hazards No     Sleep Concern Yes     Comment: Sleeps too much -- tired after 11 hr sleep     Stress Concern No     Comment: work; $ bills-->coping     Weight Concern Yes     Comment: gained 30 lb on Paxil     Special Diet Yes     Comment: vegetarian     Back Care Yes     Comment: lower back injury w chronic stiffness     Exercise Yes     Comment: sedentary     Bike Helmet Yes     Seat Belt No     Self-Exams No   Social History Narrative    Dairy/d 5 servings/d.     Caffeine 0 servings/d    Exercise 4 x week    Sunscreen used - Yes    Seatbelts used - Yes    Working smoke/CO detectors in the home - Yes     Guns stored in the home - No    Self Breast Exams - No    Self Testicular Exam - NA    Eye Exam up to date - Yes    Dental Exam up to date - No    Pap Smear up to date - Yes    Mammogram up to date - NA    PSA up to date - NA    Dexa Scan up to date - NA    Flex Sig / Colonoscopy up to date - Yes    Immunizations up to date - Yes    Abuse: Current or Past(Physical, Sexual or Emotional)- No    Do you feel safe in your environment - Yes         Social Determinants of Health     Financial Resource Strain: Not on file   Food Insecurity: Not on file   Transportation Needs: Not on file   Physical Activity: Not on file   Stress: Not on file   Social Connections: Not on file   Intimate Partner Violence: Not on file   Housing Stability: Not on file       FAMILY HISTORY:   Family History   Problem Relation Age of Onset     Thyroid Disease Mother         removed     Cancer Paternal Grandmother         stomach cancer     Alzheimer Disease Paternal Grandmother      Depression Sister      Thyroid Disease Sister      Thyroid Disease Sister         on meds     Diabetes Brother      Depression Brother      Diabetes Nephew      Glaucoma No family hx of      Macular Degeneration No family hx of      Non-contributory for problems with anesthesia    REVIEW OF SYSTEMS:   The patient was asked a 14 point review of systems regarding constitutional symptoms, eye symptoms, ears, nose, mouth, throat symptoms, cardiovascular symptoms, respiratory symptoms, gastrointestinal symptoms, genitourinary symptoms, musculoskeletal symptoms, integumentary symptoms, neurological symptoms, psychiatric symptoms, endocrine symptoms, hematologic/lymphatic symptoms, and allergic/ immunologic symptoms.   The pertinent factors have been included in the HPI and below.  Patient Supplied Answers to Review of Systems  [facility] ENT ROS 4/19/2022   Constitutional Weight gain, Unexplained fatigue   Cardiopulmonary Breathing problems, Chest pain     Physical Examination    The patient underwent a physical examination as described below. The pertinent positive and negative findings are summarized after the description of the examination.  Constitutional: The patient's developmental and nutritional status was assessed. The patient's voice quality was assessed.  Head and Face: The head and face were inspected for deformities. The sinuses were palpated. The salivary glands were palpated. Facial muscle strength was assessed bilaterally.  Eyes: Extraocular movements and primary gaze alignment were assessed.  Ears, Nose, Mouth and Throat: The ears and nose were examined for deformities. EAC and TM's were clear bilaterally  The nasal septum, mucosa, and turbinates were inspected by anterior rhinoscopy. The lips, teeth, and gums were examined for abnormalities. The oral mucosa, tongue, palate, tonsils, lateral and posterior pharynx were inspected for the presence of asymmetry or mucosal lesions.    Neck: The tracheal position was noted, and the neck mass palpated to determine if there were any asymmetries, abnormal neck masses, thyromegally, or thyroid nodules.  Respiratory: The nature of the breathing and chest expansion/symmetry was observed.  Cardiovascular: The patient was examined to determine the presence of any edema or jugular venous distension.  Abdomen: The contour of the abdomen was noted.  Lymphatic: The patient was examined for infraclavicular lymphadenopathy.  Musculoskeletal: The patient was inspected for the presence of skeletal deformities.  Extremities: The extremities were examined for any clubbing or cyanosis.  Skin: The skin was examined for inflammatory or neoplastic conditions.  Neurologic: The patient's orientation, mood, and affect were noted. The cranial nerve  functions were examined.  Other pertinent positive and negative findings on physical examination:      OC/OP: no lesions, uvula midline, soft palate elevates symmetrically  Neck: no lesions, bilateral TH  tenderness to palpation    All other physical examination findings were within normal limits and noncontributory.  Procedures   BEHAVIORAL & QUALITATIVE EVALUATION OF VOICE AND RESONENCE   Comments: F0 198 Hz MPT: 8 seconds  Vocal Quality: Normal    Pitch Range:  Normal 200- 480  Hz  Phrase Length:  Normal  Vocal Loudness: Normal  Dysarthria: No    Flexible laryngoscopy (CPT 93472)      Pre-procedure diagnosis: dysphonia  Post-procedure diagnosis: same as above  Indication for procedure: Ms. Stark is a 51 year old female with see above  Procedure(s): Fiberoptic Laryngoscopy    Details of Procedure: After informed consent was obtained, the patient was seated in the examination chair.  The areas of the nasopharynx as well as the hypopharynx were anesthetized with topical 4% lidocaine with 0.25% phenylephrine atomizer.  Examination of the base of tongue was performed first.  Attention was directed to any evidence of masses in the area or evidence of leukoplakia or candidal infection.  Attention was directed to the epiglottis where its size and position was determined and its movement on phonation of the vowel  e .  The piriform sinuses were then inspected for any mass lesions or pooling of secretions.  Attention was then directed to the larynx. The vocal folds were inspected for infection or any areas of leukoplakia, for masses, polypoid degeneration, or hemorrhage.  Having done this, the arytenoids and vocal processes were inspected for erythema or evidence of granuloma formation.  The posterior commissure was then inspected for evidence of inflammatory changes in the mucosa and heaping up of mucosal tissue. The patient was then instructed to say the vowel  e .  Adduction of vocal folds to the midline was observed for any evidence of paresis or paralysis of the larynx or asymmetry in rotation of the larynx to the left or right. The patient was asked to breathe and the degree of abduction was noted bilaterally.   Subglottic view of the larynx was obtained for any additional mass lesions or mucosal changes.  Finally the post cricoid was examined for evidence of pooling of secretions, as well as the pharyngeal wall mucosa.   Anesthesia type: 0.25% phenylephrine    Findings:  Anatomic/physiological deviations: LNC, patent subglottis, mild paradoxical vocal fold breathing with quiet breathing   Right vocal process: No restriction of mobility   Left vocal process: No restriction of mobility  Glottal gap: Complete glottal closure  Supraglottic structures: Normal  Hypopharynx: Normal     Estimated Blood Loss: minimal  Complications: None  Disposition: Patient tolerated the procedure well        Review of Relevant Clinical Data   I personally reviewed:  Notes: Dr. Vasquez - cardiology 4/12/22    Dr. Keller 4/29/21    Dr. Encinas 2/22/22    Radiology:   CT Chest 1/21/22   1. No acute finding. Lungs are clear.  2. Pectus excavatum.        XR Chest 4/29/21  Normal Pulmonary Function     Procedures:     PFT 4/29/21  Normal spirometry.   Normal lung volumes.   Normal diffusing capacity.   Flattening of the expiratory limb of the flow volume loop suggests intrathoracic upper airway obstruction.  Clinical correlation is recommended.     PFT 08/28/09  The spirometry is normal.   The uncorrected diffusing capacity is normal.   There are no prior studies available for comparison.     Labs:  Lab Results   Component Value Date    TSH 1.82 04/12/2022     Lab Results   Component Value Date     04/12/2022    CO2 26 04/12/2022    BUN 10 04/12/2022     Lab Results   Component Value Date    WBC 6.5 04/12/2022    HGB 11.9 04/12/2022    HCT 36.4 04/12/2022    MCV 86 04/12/2022     04/12/2022     No results found for: PT, PTT, INR  No results found for: YASHIRA  No components found for: RHEUMATOIDFACTOR,  RF  Lab Results   Component Value Date    CRP 3.5 03/19/2021    CRP <2.9 11/19/2020     No components found for: CKTOT, URICACID  No components  "found for: C3, C4, DSDNAAB, NDNAABIFA  No results found for: MPOAB    Patient reported Quality of Life (QOL) Measures   Patient Supplied Answers To VHI Questionnaire  Voice Handicap Index (VHI-10) 4/19/2022   My voice makes it difficult for people to hear me 0   People have difficulty understanding me in a noisy room 0   My voice difficulties restrict my personal and social life.  0   I feel left out of conversations because of my voice 0   My voice problem causes me to lose income 0   I feel as though I have to strain to produce voice 0   The clarity of my voice is unpredictable 0   My voice problem upsets me 0   My voice makes me feel handicapped 0   People ask, \"What's wrong with your voice?\" 0   VHI-10 0     Patient Supplied Answers To EAT Questionnaire  Eating Assessment Tool (EAT-10) 4/19/2022   My swallowing problem has caused me to lose weight 0   My swallowing problem interferes with my ability to go out for meals 0   Swallowing liquids takes extra effort 0   Swallowing solids takes extra effort 0   Swallowing pills takes extra effort 0   Swallowing is painful 0   The pleasure of eating is affected by my swallowing 0   When I swallow food sticks in my throat 0   I cough when I eat 0   Swallowing is stressful 0   EAT-10 0     Patient Supplied Answers To CSI Questionnaire  Cough Severity Index (CSI) 4/19/2022   My cough is worse when I lie down 0   My coughing problem causes me to restrict my personal and social life 0   I tend to avoid places because of my cough problem 0   I feel embarrassed because of my coughing problem 0   People ask, ''What's wrong?'' because I cough a lot 0   I run out of air when I cough 0   My coughing problem affects my voice 0   My coughing problem limits my physical activity 0   My coughing problem upsets me 0   People ask me if I am sick because I cough a lot 0   CSI Score 0     Patient Supplied Answers to Dyspnea Index Questionnaire:  No flowsheet data found.    Impression & " Plan     IMPRESSION: Ms. Stark is a 51 year old female who is being seen for the followin. Dyspnea   - in the setting of COVID in 2020  - breathing issues since then  - working with pulm- Dr Encinas 22  - had CXR, spirometry, ECHO   - CT chest  - patent subglottis  - symptoms 22 difficulty breathing with talking and catching in her breath in the evenings and with activity   - scope 22 shows patent subglottis, mild paradoxical vocal fold breathing with quiet breathing  - symptoms likely due to cardiac etiology, and secondarily due to poor breathing mechanics, no obvious paradoxical vocal fold motion    - recommend a few sessions of breathing therapy to work on optimizing breathing mechanics   - discussed importance of wearing the heart monitor and following with cardiology  - discussed voice therapy will optimize her breathing  Plan  - breathing therapy    2. Ear fullness  - complains of bilateral ear fullness  - EAC clear, TM intact, no effusion  - no evidence of ear infection  - recommended audiogram and otology evaluation  - she can do this here at [facility] [place] closer to [place]  Plan  - otology follow up     RETURN VISIT: as needed after therapy      Scribe Disclosure:  IBlue, am serving as a scribe to document services personally performed by Michelle Wheeler MD at this visit, based upon the provider's statements to me. All documentation has been reviewed by the aforementioned provider prior to being entered into the official medical record.     Scribe Preparation Attestation:  Lala MCDONNELL, a scribe, assisted in the preparation of the chart for today's encounter.         Thank you for the kind referral and for allowing me to share in the care of Ms. Stark. If you have any questions, please do not hesitate to contact me.      Michelle Wheeler MD    Laryngology    [facility]  Department of  Otolaryngology - Head and Neck  Surgery  Clinics & [facility] Center  [address] [place] [id number]  Appointment line: [phone]  Fax: [phone]  [email or web address]

## 2022-04-19 NOTE — LETTER
2022       RE: Hailey Stark  [address]  [facility] [address]     Dear Colleague,    Thank you for referring your patient, Hailey Stark, to the [facility] [place] at [facility]. Please see a copy of my visit note below.        [facility]   at the [facility]   Otolaryngology Clinic     Patient: Hailey Stark    MRN: [id number]    : 1970    Age/Gender: 51 year old female  Date of Service: 2022  Rendering Provider:   Michelle Wheeler MD     Referring Provider   PCP: Nacho Lara  Referring Physician: Riaz Keller MD  [address] [place] [address]  Reason for Consultation   Dyspnea  History   HISTORY OF PRESENT ILLNESS: I was asked to consult on Hailey Stark, by Dr. Keller for evaluation of dyspnea . Ms. Stark is a 51 year old female who presents to us today for consultation.      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     she presents today for evaluation. she reports:    - currently wearing a heart monitor to evaluate palpitations since COVID-19.  - ears feel plugged  - can have difficulty hearing others, especially low voices    Dysphonia  - high voice use at nursing home  - unsure of voice changes  - pain with talking no  - feels winded with talking  - no straining to talk  - can feel dry with talking  - does not sing  - laughing is ok for her       Dysphagia  - denies      Dyspnea  - went back to work after COVID-  - felt dyspnea  - tried inhalers without relief  - had exercised induced asthma with activity  - has difficulty talking to patients who are [place] in the nursing home  - has to get closer to them  - has to redo her PFT test in   - feels chest heaviness and tachycardia  - had ablation in  for tachycardia  - initially improved her symptoms but then returned after COVID-  - HR  140 while walking  - went to the ER last week  - saw cardiology yesterday to establish care  - dyspnea with talking, walking, stairs, and walking at inclines  - no stridor  - feels that she 'cannot catch up with her air'  - wakes up gasping for breath, then 'catches up' with her breath  - does not cough when she wakes up  - is told she snores  - no sleep apnea  - people look at her when she is winded  - takes breaths in between talking  - no neck surgeries  - no intubation      Throat clearing/cough  - denies      GERD/LPRD   - used to have this  - dry throat in the morning  - no sore throat in the morning    PAST MEDICAL HISTORY:   Past Medical History:   Diagnosis Date     Anxiety state, unspecified     Anxiety     Cardiac dysrhythmia, unspecified     Arrhythmia NOS s/p ablation     Chronic peptic ulcer, unspecified site, without mention of hemorrhage, perforation, or obstruction     Ulcer, Peptic     Depressive disorder, not elsewhere classified     Depression (non-psychotic)     Leukorrhea, not specified as infective 11/18/2009    heavy, daily, yellow/green mucoid dischg following retained tampon removal.Tx w flagyl, metrogel, Cleocin, Diflucan, doxycycline, boric acid capsules. 5/16/2011 + GBS.      Menarche age 13    cycles q 30 x 5 d, heavy     Moderate dysplasia of cervix (KENNY II) 1998     Normal Papssince LEEP->routine screening     PAST SURGICAL HISTORY:   Past Surgical History:   Procedure Laterality Date     CARDIAC SURGERY  2000    Catheter ablation     Cervical Conization Loop Electrode Excision  1998    KENNY II  F/U Pap q 3 mo x 2 yrs were all NORMAL     COSMETIC SURGERY  2005& 2006    Liposuction     EYE SURGERY  2006    Lasix     KIDNEY SURGERY  summer 2012    6 kidney stone excision/stent placed     LASIK       ORTHOPEDIC SURGERY  2012    Bunionectomy     SURGICAL HISTORY OF -       lasik     SURGICAL HISTORY OF -       catheter ablation heart atrial fib tx     VASCULAR SURGERY  Feb. 2020     Adhesive ablation     CURRENT MEDICATIONS:   Current Outpatient Medications:      albuterol (PROAIR HFA/PROVENTIL HFA/VENTOLIN HFA) 108 (90 Base) MCG/ACT inhaler, Inhale 2 puffs into the lungs every 6 hours, Disp: 18 g, Rfl: 1     buPROPion (WELLBUTRIN XL) 150 MG 24 hr tablet, Take 1 tablet (150 mg) by mouth every morning, Disp: 90 tablet, Rfl: 3     fluticasone-salmeterol (ADVAIR) 250-50 MCG/DOSE inhaler, Inhale 1 puff into the lungs every 12 hours, Disp: 2 each, Rfl: 1     ibuprofen (ADVIL,MOTRIN) 800 MG tablet, Take 1 tablet (800 mg) by mouth every 8 hours as needed for moderate pain, Disp: 30 tablet, Rfl: 0     levothyroxine (SYNTHROID/LEVOTHROID) 50 MCG tablet, Take 1 tablet (50 mcg) by mouth daily, Disp: 56 tablet, Rfl: 0     PARoxetine (PAXIL) 20 MG tablet, Take 1 tablet (20 mg) by mouth every morning, Disp: 30 tablet, Rfl: 1    ALLERGIES: Meloxicam    SOCIAL HISTORY:    Social History     Socioeconomic History     Marital status: Single     Spouse name: Not on file     Number of children: Not on file     Years of education: Not on file     Highest education level: Not on file   Occupational History     Not on file   Tobacco Use     Smoking status: Never Smoker     Smokeless tobacco: Never Used   Substance and Sexual Activity     Alcohol use: Not Currently     Comment: 1-2 beers 1-2Xs/mo     Drug use: No     Sexual activity: Yes     Partners: Male     Birth control/protection: None   Other Topics Concern     Parent/sibling w/ CABG, MI or angioplasty before 65F 55M? No      Service No     Blood Transfusions No     Caffeine Concern No     Comment: 2 s/d     Occupational Exposure Yes     Comment: LPN at  Center     Hobby Hazards No     Sleep Concern Yes     Comment: Sleeps too much -- tired after 11 hr sleep     Stress Concern No     Comment: work; $ bills-->coping     Weight Concern Yes     Comment: gained 30 lb on Paxil     Special Diet Yes     Comment: vegetarian     Back Care Yes     Comment: lower  back injury w chronic stiffness     Exercise Yes     Comment: sedentary     Bike Helmet Yes     Seat Belt No     Self-Exams No   Social History Narrative    Dairy/d 5 servings/d.     Caffeine 0 servings/d    Exercise 4 x week    Sunscreen used - Yes    Seatbelts used - Yes    Working smoke/CO detectors in the home - Yes    Guns stored in the home - No    Self Breast Exams - No    Self Testicular Exam - NA    Eye Exam up to date - Yes    Dental Exam up to date - No    Pap Smear up to date - Yes    Mammogram up to date - NA    PSA up to date - NA    Dexa Scan up to date - NA    Flex Sig / Colonoscopy up to date - Yes    Immunizations up to date - Yes    Abuse: Current or Past(Physical, Sexual or Emotional)- No    Do you feel safe in your environment - Yes         Social Determinants of Health     Financial Resource Strain: Not on file   Food Insecurity: Not on file   Transportation Needs: Not on file   Physical Activity: Not on file   Stress: Not on file   Social Connections: Not on file   Intimate Partner Violence: Not on file   Housing Stability: Not on file       FAMILY HISTORY:   Family History   Problem Relation Age of Onset     Thyroid Disease Mother         removed     Cancer Paternal Grandmother         stomach cancer     Alzheimer Disease Paternal Grandmother      Depression Sister      Thyroid Disease Sister      Thyroid Disease Sister         on meds     Diabetes Brother      Depression Brother      Diabetes Nephew      Glaucoma No family hx of      Macular Degeneration No family hx of      Non-contributory for problems with anesthesia    REVIEW OF SYSTEMS:   The patient was asked a 14 point review of systems regarding constitutional symptoms, eye symptoms, ears, nose, mouth, throat symptoms, cardiovascular symptoms, respiratory symptoms, gastrointestinal symptoms, genitourinary symptoms, musculoskeletal symptoms, integumentary symptoms, neurological symptoms, psychiatric symptoms, endocrine symptoms,  hematologic/lymphatic symptoms, and allergic/ immunologic symptoms.   The pertinent factors have been included in the HPI and below.  Patient Supplied Answers to Review of Systems   ENT ROS 4/19/2022   Constitutional Weight gain, Unexplained fatigue   Cardiopulmonary Breathing problems, Chest pain     Physical Examination   The patient underwent a physical examination as described below. The pertinent positive and negative findings are summarized after the description of the examination.  Constitutional: The patient's developmental and nutritional status was assessed. The patient's voice quality was assessed.  Head and Face: The head and face were inspected for deformities. The sinuses were palpated. The salivary glands were palpated. Facial muscle strength was assessed bilaterally.  Eyes: Extraocular movements and primary gaze alignment were assessed.  Ears, Nose, Mouth and Throat: The ears and nose were examined for deformities. EAC and TM's were clear bilaterally  The nasal septum, mucosa, and turbinates were inspected by anterior rhinoscopy. The lips, teeth, and gums were examined for abnormalities. The oral mucosa, tongue, palate, tonsils, lateral and posterior pharynx were inspected for the presence of asymmetry or mucosal lesions.    Neck: The tracheal position was noted, and the neck mass palpated to determine if there were any asymmetries, abnormal neck masses, thyromegally, or thyroid nodules.  Respiratory: The nature of the breathing and chest expansion/symmetry was observed.  Cardiovascular: The patient was examined to determine the presence of any edema or jugular venous distension.  Abdomen: The contour of the abdomen was noted.  Lymphatic: The patient was examined for infraclavicular lymphadenopathy.  Musculoskeletal: The patient was inspected for the presence of skeletal deformities.  Extremities: The extremities were examined for any clubbing or cyanosis.  Skin: The skin was examined for  inflammatory or neoplastic conditions.  Neurologic: The patient's orientation, mood, and affect were noted. The cranial nerve  functions were examined.  Other pertinent positive and negative findings on physical examination:      OC/OP: no lesions, uvula midline, soft palate elevates symmetrically  Neck: no lesions, bilateral TH tenderness to palpation    All other physical examination findings were within normal limits and noncontributory.  Procedures   BEHAVIORAL & QUALITATIVE EVALUATION OF VOICE AND RESONENCE   Comments: F0 198 Hz MPT: 8 seconds  Vocal Quality: Normal    Pitch Range:  Normal 200- 480  Hz  Phrase Length:  Normal  Vocal Loudness: Normal  Dysarthria: No    Flexible laryngoscopy (CPT 52877)      Pre-procedure diagnosis: dysphonia  Post-procedure diagnosis: same as above  Indication for procedure: Ms. Stark is a 51 year old female with see above  Procedure(s): Fiberoptic Laryngoscopy    Details of Procedure: After informed consent was obtained, the patient was seated in the examination chair.  The areas of the nasopharynx as well as the hypopharynx were anesthetized with topical 4% lidocaine with 0.25% phenylephrine atomizer.  Examination of the base of tongue was performed first.  Attention was directed to any evidence of masses in the area or evidence of leukoplakia or candidal infection.  Attention was directed to the epiglottis where its size and position was determined and its movement on phonation of the vowel  e .  The piriform sinuses were then inspected for any mass lesions or pooling of secretions.  Attention was then directed to the larynx. The vocal folds were inspected for infection or any areas of leukoplakia, for masses, polypoid degeneration, or hemorrhage.  Having done this, the arytenoids and vocal processes were inspected for erythema or evidence of granuloma formation.  The posterior commissure was then inspected for evidence of inflammatory changes in the mucosa and heaping up of  mucosal tissue. The patient was then instructed to say the vowel  e .  Adduction of vocal folds to the midline was observed for any evidence of paresis or paralysis of the larynx or asymmetry in rotation of the larynx to the left or right. The patient was asked to breathe and the degree of abduction was noted bilaterally.  Subglottic view of the larynx was obtained for any additional mass lesions or mucosal changes.  Finally the post cricoid was examined for evidence of pooling of secretions, as well as the pharyngeal wall mucosa.   Anesthesia type: 0.25% phenylephrine    Findings:  Anatomic/physiological deviations: LNC, patent subglottis, mild paradoxical vocal fold breathing with quiet breathing   Right vocal process: No restriction of mobility   Left vocal process: No restriction of mobility  Glottal gap: Complete glottal closure  Supraglottic structures: Normal  Hypopharynx: Normal     Estimated Blood Loss: minimal  Complications: None  Disposition: Patient tolerated the procedure well        Review of Relevant Clinical Data   I personally reviewed:  Notes: Dr. Vasquez - cardiology 4/12/22    Dr. Keller 4/29/21    Dr. Encinas 2/22/22    Radiology:   CT Chest 1/21/22   1. No acute finding. Lungs are clear.  2. Pectus excavatum.        XR Chest 4/29/21  Normal Pulmonary Function     Procedures:     PFT 4/29/21  Normal spirometry.   Normal lung volumes.   Normal diffusing capacity.   Flattening of the expiratory limb of the flow volume loop suggests intrathoracic upper airway obstruction.  Clinical correlation is recommended.     PFT 08/28/09  The spirometry is normal.   The uncorrected diffusing capacity is normal.   There are no prior studies available for comparison.     Labs:  Lab Results   Component Value Date    TSH 1.82 04/12/2022     Lab Results   Component Value Date     04/12/2022    CO2 26 04/12/2022    BUN 10 04/12/2022     Lab Results   Component Value Date    WBC 6.5 04/12/2022    HGB 11.9  "04/12/2022    HCT 36.4 04/12/2022    MCV 86 04/12/2022     04/12/2022     No results found for: PT, PTT, INR  No results found for: YASHIRA  No components found for: RHEUMATOIDFACTOR,  RF  Lab Results   Component Value Date    CRP 3.5 03/19/2021    CRP <2.9 11/19/2020     No components found for: CKTOT, URICACID  No components found for: C3, C4, DSDNAAB, NDNAABIFA  No results found for: MPOAB    Patient reported Quality of Life (QOL) Measures   Patient Supplied Answers To VHI Questionnaire  Voice Handicap Index (VHI-10) 4/19/2022   My voice makes it difficult for people to hear me 0   People have difficulty understanding me in a noisy room 0   My voice difficulties restrict my personal and social life.  0   I feel left out of conversations because of my voice 0   My voice problem causes me to lose income 0   I feel as though I have to strain to produce voice 0   The clarity of my voice is unpredictable 0   My voice problem upsets me 0   My voice makes me feel handicapped 0   People ask, \"What's wrong with your voice?\" 0   VHI-10 0     Patient Supplied Answers To EAT Questionnaire  Eating Assessment Tool (EAT-10) 4/19/2022   My swallowing problem has caused me to lose weight 0   My swallowing problem interferes with my ability to go out for meals 0   Swallowing liquids takes extra effort 0   Swallowing solids takes extra effort 0   Swallowing pills takes extra effort 0   Swallowing is painful 0   The pleasure of eating is affected by my swallowing 0   When I swallow food sticks in my throat 0   I cough when I eat 0   Swallowing is stressful 0   EAT-10 0     Patient Supplied Answers To CSI Questionnaire  Cough Severity Index (CSI) 4/19/2022   My cough is worse when I lie down 0   My coughing problem causes me to restrict my personal and social life 0   I tend to avoid places because of my cough problem 0   I feel embarrassed because of my coughing problem 0   People ask, ''What's wrong?'' because I cough a lot 0 "   I run out of air when I cough 0   My coughing problem affects my voice 0   My coughing problem limits my physical activity 0   My coughing problem upsets me 0   People ask me if I am sick because I cough a lot 0   CSI Score 0     Patient Supplied Answers to Dyspnea Index Questionnaire:  No flowsheet data found.    Impression & Plan     IMPRESSION: Ms. Stark is a 51 year old female who is being seen for the followin. Dyspnea   - in the setting of COVID in 2020  - breathing issues since then  - working with pulm- Dr Encinas 22  - had CXR, spirometry, ECHO   - CT chest  - patent subglottis  - symptoms 22 difficulty breathing with talking and catching in her breath in the evenings and with activity   - scope 22 shows patent subglottis, mild paradoxical vocal fold breathing with quiet breathing  - symptoms likely due to cardiac etiology, and secondarily due to poor breathing mechanics, no obvious paradoxical vocal fold motion    - recommend a few sessions of breathing therapy to work on optimizing breathing mechanics   - discussed importance of wearing the heart monitor and following with cardiology  - discussed voice therapy will optimize her breathing  Plan  - breathing therapy    2. Ear fullness  - complains of bilateral ear fullness  - EAC clear, TM intact, no effusion  - no evidence of ear infection  - recommended audiogram and otology evaluation  - she can do this here at [facility] or [place] location closer to home  Plan  - otology follow up     RETURN VISIT: as needed after therapy      Scribe Disclosure:  IBlue, am serving as a scribe to document services personally performed by Michelle Wheeler MD at this visit, based upon the provider's statements to me. All documentation has been reviewed by the aforementioned provider prior to being entered into the official medical record.     Scribe Preparation Attestation:  Lala MCDONNELL, a scribe, assisted in the preparation of the chart  for today's encounter.         Thank you for the kind referral and for allowing me to share in the care of Ms. Stark. If you have any questions, please do not hesitate to contact me.      Michelle Wheeler MD    Laryngology    [facility]  Department of  Otolaryngology - Head and Neck Surgery  Clinics & Surgery Center  [address] [place] [id number]  Appointment line: [phone]  Fax: [phone]  [email or web address]          Again, thank you for allowing me to participate in the care of your patient.      Sincerely,    Michelle Wheeler MD

## 2022-04-19 NOTE — LETTER
4/19/2022       RE: Hailey Stark  [address]  [facility] [address]     Dear Colleague,    Thank you for referring your patient, Hailey Stark, to the [facility]. Please see a copy of my visit note below.      [facility] VOICE [facility]  Adan Dominguez Jr., M.D., F.A.C.S.  Radha Berkowitz M.D., M.P.H.  Michelle Wheeler M.D.  Juana Pruitt, Ph.D., CCC-SLP  Lion Loco, Ph.D., [facility]-SLP  Juliette Talley M.M. (voice), M.A., CCC-SLP  Narciso Latif M.M. (voice), MYISEL., CCC-SLP  HEMA Cortes (voice), M.S., CCC-SLP    Evaluation report - IN PERSON APPOINTMENT    Clinician: Juliette Talley M.M. (voice), M.A., CCC-SLP  Seen in conjunction with: Dr. Michelle Wheeler  Referring physician:  Krishna  Patient: Hailey Stark  Date of Visit: 4/19/2022    HISTORY  Chief complaint: Hailey Stark is a 51 year old presenting today for evaluation of dyspnea.  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      Now has multiple symptoms of long-COVID, including:    tachacardia    winded when talking     - winded while talking     - winded while walking up steps      - denies stridor     - feels like she can't catch up with her air.     - feels like she stops breathing at night and  then eventually catches up.      - feels like she can't catch her breath     - denies stridor     - others have noted that she snores.     - wakes up dry in the throat     - high chest breath     - breathing has stayed the same the whole time - poor    Hx of exercise induced asthma when working out prior to covid    ADDITIONAL    has not missed any work. - walking her heart rate goes up    hearing: ears feel full/ blocked.  Has avoided using q-tips and other     OTHER PERTINENT HISTORY    Complex medical history: please also refer to Dr. Wheeler's dictation.     Past Medical History:   Diagnosis Date     Anxiety state, unspecified     Anxiety     Cardiac dysrhythmia, unspecified     Arrhythmia NOS s/p ablation     Chronic peptic ulcer, unspecified site, without mention of hemorrhage, perforation, or obstruction     Ulcer, Peptic     Depressive disorder, not elsewhere classified     Depression (non-psychotic)     Leukorrhea, not specified as infective 11/18/2009    heavy, daily, yellow/green mucoid dischg following retained tampon removal.Tx w flagyl, metrogel, Cleocin, Diflucan, doxycycline, boric acid capsules. 5/16/2011 + GBS.      Menarche age 13    cycles q 30 x 5 d, heavy     Moderate dysplasia of cervix (KENNY II) 1998     Normal Papssince LEEP->routine screening     Past Surgical History:   Procedure Laterality Date     CARDIAC SURGERY  2000    Catheter ablation     Cervical Conization Loop Electrode Excision  1998    KENNY II  F/U Pap q 3 mo x 2 yrs were all NORMAL     COSMETIC SURGERY  2005& 2006    Liposuction     EYE SURGERY  2006    Lasix     KIDNEY SURGERY  summer 2012    6 kidney stone excision/stent placed     LASIK       ORTHOPEDIC SURGERY  2012    Bunionectomy     SURGICAL HISTORY OF -       lasik     SURGICAL HISTORY OF -       catheter ablation heart atrial fib tx     VASCULAR SURGERY  Feb. 2020    Adhesive ablation       OBJECTIVE  PATIENT REPORTED MEASURES  Patient Supplied Answers To I  "Questionnaire  Voice Handicap Index (VHI-10) 4/19/2022   My voice makes it difficult for people to hear me 0   People have difficulty understanding me in a noisy room 0   My voice difficulties restrict my personal and social life.  0   I feel left out of conversations because of my voice 0   My voice problem causes me to lose income 0   I feel as though I have to strain to produce voice 0   The clarity of my voice is unpredictable 0   My voice problem upsets me 0   My voice makes me feel handicapped 0   People ask, \"What's wrong with your voice?\" 0   VHI-10 0       Patient Supplied Answers To CSI Questionnaire  Cough Severity Index (CSI) 4/19/2022   My cough is worse when I lie down 0   My coughing problem causes me to restrict my personal and social life 0   I tend to avoid places because of my cough problem 0   I feel embarrassed because of my coughing problem 0   People ask, ''What's wrong?'' because I cough a lot 0   I run out of air when I cough 0   My coughing problem affects my voice 0   My coughing problem limits my physical activity 0   My coughing problem upsets me 0   People ask me if I am sick because I cough a lot 0   CSI Score 0       Patient Supplied Answers To EAT Questionnaire  Eating Assessment Tool (EAT-10) 4/19/2022   My swallowing problem has caused me to lose weight 0   My swallowing problem interferes with my ability to go out for meals 0   Swallowing liquids takes extra effort 0   Swallowing solids takes extra effort 0   Swallowing pills takes extra effort 0   Swallowing is painful 0   The pleasure of eating is affected by my swallowing 0   When I swallow food sticks in my throat 0   I cough when I eat 0   Swallowing is stressful 0   EAT-10 0       PERCEPTUAL EVALUATION (CPT 54489)  POSTURE / TENSION/ PALPATION OF THE LARYNGEAL AREA:     upper body    neck and shoulders    BREATHING:     inspirations are inadequate in volume and frequency    clavicular elevation on inspiration    phonation is " not consistently coordinated with respiration    LARYNGEAL PALPATION:     firm musculature    tenderness of the thyrohyoid area    reduced thyrohyoid space    VOICE:    Mi states that today is a typical voice today, with clinician observing voice quality to be characterized as:    Roughness: Minimal    Breathiness: Minimal    Strain: WNL     Sounds short of breath and speaks with shorter phrases    Pitch:    F0/ Habitual Pitch: 198 Hz 480-200 Hz    Pitch glide: neurologically normal    Maximum Phonation Time: limited at 6-7 seconds  o Resonance:     Conversational speech:  laryngeal pharyngeal resonance    Loudness    Conversational speech:  Mildly reduced    Projected speech:  Mild to moderately reduced     Singing vs. Speech:  n/a    GLOBAL ASSESSMENT OF DYSPHONIA: 5/100    CAPE-V Overall Severity:  15/100    COUGH/THROAT CLEARING:    not observed today    LARYNGEAL FUNCTION STUDIES (CPT 46562)  n/a    LARYNGEAL EXAMINATION  Procedure: Flexible endoscopy with chip-tip technology without stroboscopy, left nostril; topical anesthesia with 3% Lidocaine and 0.25% phenylephrine was applied.   Performed by: Dr. Wheeler  The laryngeal and pharyngeal structures were evaluated for gross appearance, mobility, function, and focal lesions / abnormalities of the associated mucosa.    All findings were within normal limits with the exception of the following salient features:   This exam shows:    Essentially healthy laryngeal mucosa    No remarkable signs of reflux    Status of vocal fold mucosa:   o Within normal limits, with no lesions and straight vibratory margins    Neurological and Functional Integrity of the Larynx    Vocal folds are mobile and meet at midline; movement is brisk and symmetric; exam is neurologically normal     Airway is patent    Elongation of the vocal folds for pitch increase is WNL    On phonation, glottic closure is complete    Supraglottic Function and Therapy Probes    Mild to moderate four-way  constriction of the supraglottic larynx during connected speech    THERAPY PROBES: Improvement was elicited with use of forward resonant stimuli, coordination of respiration and phonation, use of yawn sigh and use of rescue breathing strategies    The addition of stroboscopy allowed evaluation of the mucosal wave:    Amplitude: right and left: WNL     Symmetry: good symmetry.    Closure pattern: complete.     Closure plane: at glottic level.     Phase distribution: normal.    The laryngeal exam was reviewed with MsEnma Mi, and I provided pertinent explanations, as well as written and oral information.       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    Remarkable findings included:    Perceptual evaluation demonstrated:     Roughness: Minimal    Breathiness: Minimal    Strain: WNL     Sounds short of breath and speaks with shorter phrases    Laryngeal exam demonstrated: mild to moderate supraglottic hyperfunction during connected speech tasks.     Primary complaint of patient today included: breathing difficulties  Therefore, we recommended a course of speech therapy to address these concerns.    STIMULABILITY: results of therapy probes during perceptual and laryngeal evaluation demonstrate improvement with use of forward resonant stimuli, coordination of respiration and phonation, use of yawn sigh and use of rescue breathing strategies    RECOMMENDATIONS:     A course of speech therapy is recommended to optimize vocal technique, improve voice quality, promote reduced discomfort, effort and fatigue and help reduce possible symptoms associated with vocal cord dysfunction and an imbalance in  respiratory mechancis. .    She demonstrates a Good prognosis for improvement given adherence to therapeutic recommendations.     Positive indicators: positive response to therapy probes high level of comittment    Negative indicators: none    GOALS:  Patient goal:   1. To improve and maintain a healthy voice quality  2. To understand the problem and fix it as much as possible  3. To breathe normally and comfortably in all situations    Short-term goal(s): Within the first 4 sessions, Ms. Stark:  1. will be able to independently list key factors in maintenance of good vocal hygiene with 80% accuracy, and report on their use outside the therapy room.  2. will utilize silent inhalation with good low-respiratory engagement 75% of the time during therapy tasks with minimal clinician support  3. will demonstrate semi-occluded vocal tract (SOVT) exercises with at least 80% accuracy with no clinician support    Long-term goal(s): In 6 months, Ms. Stark will:  1. Report resolution of symptoms, and use of optimal voice quality and comfort to meet personal, social, and professional needs, 90% of the time during a typical week of vocal activities  2. Report improvement in respiratory mechancs for speech and daily activities.    TOTAL SERVICE TIME: 60 minutes  EVALUATION OF VOICE AND RESONANCE (35553)  NO CHARGE FACILITY FEE (00962)    Juliette Talley M.M. (voice), M.A., CCC/SLP  Speech-Language Pathologist  [facility] Trained Vocologist  [facility]  [phone]  [email or web address]  Pronouns: she/her      *this report was created in part through the use of computerized dictation software, and though reviewed following completion, some typographic errors may persist.  If there is confusion regarding any of this notes contents, please contact me for clarification        Again, thank you for allowing me to participate in the care of your patient.      Sincerely,    Juliette Talley, SLP

## 2022-04-19 NOTE — PROGRESS NOTES
[facility] VOICE CLINIC  Adan Dominguez Jr., M.D., F.A.C.S.  Radha Berkowitz M.D., M.P.H.  Michelle Wheeler M.D.  Juana Pruitt, Ph.D., CCC-SLP  Lion Loco, Ph.D., [facility]-SLP  Juliette Talley M.M. (voice), M.A., CCC-SLP  Narciso Latif M.M. (voice), MYISEL., CCC-SLP  HEMA Cortes (voice), M.S., CCC-SLP    Evaluation report - IN PERSON APPOINTMENT    Clinician: Juliette Talley M.M. (voice), M.A., CCC-SLP  Seen in conjunction with: Dr. Michelle Wheeler  Referring physician:  Krishna  Patient: Hailey Stark  Date of Visit: 4/19/2022    HISTORY  Chief complaint: Hailey Stark is a 51 year old presenting today for evaluation of dyspnea.  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      Now has multiple symptoms of long-COVID, including:    tachacardia    winded when talking     - winded while talking     - winded while walking up steps      - denies stridor     - feels like she can't catch up with her air.     - feels like she stops breathing at night and then eventually catches up.      - feels like she can't catch her breath     - denies stridor     - others have noted that she snores.     - wakes up dry in the throat     - high chest breath     - breathing has stayed the same the whole time - poor    Hx of exercise induced asthma when working out prior to  covid    ADDITIONAL    has not missed any work. - walking her heart rate goes up    hearing: ears feel full/ blocked.  Has avoided using q-tips and other     OTHER PERTINENT HISTORY    Complex medical history: please also refer to Dr. Wheeler's dictation.     Past Medical History:   Diagnosis Date     Anxiety state, unspecified     Anxiety     Cardiac dysrhythmia, unspecified     Arrhythmia NOS s/p ablation     Chronic peptic ulcer, unspecified site, without mention of hemorrhage, perforation, or obstruction     Ulcer, Peptic     Depressive disorder, not elsewhere classified     Depression (non-psychotic)     Leukorrhea, not specified as infective 11/18/2009    heavy, daily, yellow/green mucoid dischg following retained tampon removal.Tx w flagyl, metrogel, Cleocin, Diflucan, doxycycline, boric acid capsules. 5/16/2011 + GBS.      Menarche age 13    cycles q 30 x 5 d, heavy     Moderate dysplasia of cervix (KENNY II) 1998     Normal Papssince LEEP->routine screening     Past Surgical History:   Procedure Laterality Date     CARDIAC SURGERY  2000    Catheter ablation     Cervical Conization Loop Electrode Excision  1998    KENNY II  F/U Pap q 3 mo x 2 yrs were all NORMAL     COSMETIC SURGERY  2005& 2006    Liposuction     EYE SURGERY  2006    Lasix     KIDNEY SURGERY  summer 2012    7 kidney stone excision/stent placed     LASIK       ORTHOPEDIC SURGERY  2012    Bunionectomy     SURGICAL HISTORY OF -       lasik     SURGICAL HISTORY OF -       catheter ablation heart atrial fib tx     VASCULAR SURGERY  Feb. 2020    Adhesive ablation       OBJECTIVE  PATIENT REPORTED MEASURES  Patient Supplied Answers To VHI Questionnaire  Voice Handicap Index (VHI-10) 4/19/2022   My voice makes it difficult for people to hear me 0   People have difficulty understanding me in a noisy room 0   My voice difficulties restrict my personal and social life.  0   I feel left out of conversations because of my voice 0   My voice problem causes me to  "lose income 0   I feel as though I have to strain to produce voice 0   The clarity of my voice is unpredictable 0   My voice problem upsets me 0   My voice makes me feel handicapped 0   People ask, \"What's wrong with your voice?\" 0   VHI-10 0       Patient Supplied Answers To CSI Questionnaire  Cough Severity Index (CSI) 4/19/2022   My cough is worse when I lie down 0   My coughing problem causes me to restrict my personal and social life 0   I tend to avoid places because of my cough problem 0   I feel embarrassed because of my coughing problem 0   People ask, ''What's wrong?'' because I cough a lot 0   I run out of air when I cough 0   My coughing problem affects my voice 0   My coughing problem limits my physical activity 0   My coughing problem upsets me 0   People ask me if I am sick because I cough a lot 0   CSI Score 0       Patient Supplied Answers To EAT Questionnaire  Eating Assessment Tool (EAT-10) 4/19/2022   My swallowing problem has caused me to lose weight 0   My swallowing problem interferes with my ability to go out for meals 0   Swallowing liquids takes extra effort 0   Swallowing solids takes extra effort 0   Swallowing pills takes extra effort 0   Swallowing is painful 0   The pleasure of eating is affected by my swallowing 0   When I swallow food sticks in my throat 0   I cough when I eat 0   Swallowing is stressful 0   EAT-10 0       PERCEPTUAL EVALUATION (CPT 63218)  POSTURE / TENSION/ PALPATION OF THE LARYNGEAL AREA:     upper body    neck and shoulders    BREATHING:     inspirations are inadequate in volume and frequency    clavicular elevation on inspiration    phonation is not consistently coordinated with respiration    LARYNGEAL PALPATION:     firm musculature    tenderness of the thyrohyoid area    reduced thyrohyoid space    VOICE:    Mi states that today is a typical voice today, with clinician observing voice quality to be characterized as:    Roughness: Minimal    Breathiness: " Minimal    Strain: WNL     Sounds short of breath and speaks with shorter phrases    Pitch:    F0/ Habitual Pitch: 198 Hz 480-200 Hz    Pitch glide: neurologically normal    Maximum Phonation Time: limited at 6-7 seconds  o Resonance:     Conversational speech:  laryngeal pharyngeal resonance    Loudness    Conversational speech:  Mildly reduced    Projected speech:  Mild to moderately reduced     Singing vs. Speech:  n/a    GLOBAL ASSESSMENT OF DYSPHONIA: 5/100    CAPE-V Overall Severity:  15/100    COUGH/THROAT CLEARING:    not observed today    LARYNGEAL FUNCTION STUDIES (CPT 51962)  n/a    LARYNGEAL EXAMINATION  Procedure: Flexible endoscopy with chip-tip technology without stroboscopy, left nostril; topical anesthesia with 3% Lidocaine and 0.25% phenylephrine was applied.   Performed by: Dr. Wheeler  The laryngeal and pharyngeal structures were evaluated for gross appearance, mobility, function, and focal lesions / abnormalities of the associated mucosa.    All findings were within normal limits with the exception of the following salient features:   This exam shows:    Essentially healthy laryngeal mucosa    No remarkable signs of reflux    Status of vocal fold mucosa:   o Within normal limits, with no lesions and straight vibratory margins    Neurological and Functional Integrity of the Larynx    Vocal folds are mobile and meet at midline; movement is brisk and symmetric; exam is neurologically normal     Airway is patent    Elongation of the vocal folds for pitch increase is WNL    On phonation, glottic closure is complete    Supraglottic Function and Therapy Probes    Mild to moderate four-way constriction of the supraglottic larynx during connected speech    THERAPY PROBES: Improvement was elicited with use of forward resonant stimuli, coordination of respiration and phonation, use of yawn sigh and use of rescue breathing strategies    The addition of stroboscopy allowed evaluation of the mucosal  wave:    Amplitude: right and left: WNL     Symmetry: good symmetry.    Closure pattern: complete.     Closure plane: at glottic level.     Phase distribution: normal.    The laryngeal exam was reviewed with Ms. Stark, and I provided pertinent explanations, as well as written and oral information.       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    Remarkable findings included:    Perceptual evaluation demonstrated:     Roughness: Minimal    Breathiness: Minimal    Strain: WNL     Sounds short of breath and speaks with shorter phrases    Laryngeal exam demonstrated: mild to moderate supraglottic hyperfunction during connected speech tasks.     Primary complaint of patient today included: breathing difficulties  Therefore, we recommended a course of speech therapy to address these concerns.    STIMULABILITY: results of therapy probes during perceptual and laryngeal evaluation demonstrate improvement with use of forward resonant stimuli, coordination of respiration and phonation, use of yawn sigh and use of rescue breathing strategies    RECOMMENDATIONS:     A course of speech therapy is recommended to optimize vocal technique, improve voice quality, promote reduced discomfort, effort and fatigue and help reduce possible symptoms associated with vocal cord dysfunction and an imbalance in respiratory mechancis. .    She demonstrates a Good prognosis for improvement given adherence to therapeutic recommendations.     Positive indicators: positive response to therapy probes high level of comittment    Negative indicators: none    GOALS:  Patient goal:   1. To improve and maintain a healthy voice  quality  2. To understand the problem and fix it as much as possible  3. To breathe normally and comfortably in all situations    Short-term goal(s): Within the first 4 sessions, Ms. Stark:  1. will be able to independently list key factors in maintenance of good vocal hygiene with 80% accuracy, and report on their use outside the therapy room.  2. will utilize silent inhalation with good low-respiratory engagement 75% of the time during therapy tasks with minimal clinician support  3. will demonstrate semi-occluded vocal tract (SOVT) exercises with at least 80% accuracy with no clinician support    Long-term goal(s): In 6 months, Ms. Stark will:  1. Report resolution of symptoms, and use of optimal voice quality and comfort to meet personal, social, and professional needs, 90% of the time during a typical week of vocal activities  2. Report improvement in respiratory mechancs for speech and daily activities.    TOTAL SERVICE TIME: 60 minutes  EVALUATION OF VOICE AND RESONANCE (59719)  NO CHARGE FACILITY FEE (88222)    Juliette Talley M.M. (voice) M.A., CCC/SLP  Speech-Language Pathologist  [facility] Trained Vocologist  [facility]  [phone]  [email or web address]  Pronouns: she/her      *this report was created in part through the use of computerized dictation software, and though reviewed following completion, some typographic errors may persist.  If there is confusion regarding any of this notes contents, please contact me for clarification

## 2022-04-21 DIAGNOSIS — H52.4 PRESBYOPIA: Primary | ICD-10-CM

## 2022-04-22 NOTE — TELEPHONE ENCOUNTER
Pilocarpine HCl 1.25 % SOLN  Last Written Prescription Date:  ?  Last Fill Quantity: ?,   # refills: ?  Last Office Visit :  12/10/2021  Future Office visit:  None     Dimas Ryan, RICKY    Optometry     Assessment & Plan         Hailey Stark is a 51 year old female with the following diagnoses:   1. Presbyopia - Both Eyes    2. Visit for eye and vision exam - Both Eyes       Pt would like new drop   Vuity (1.25%Dionicio) [facility] pharmacy will provide   One drop every day each eye   Went over side effects           Patient disposition:   No follow-ups on file.       Routing refill request to provider for review/approval because:  Drug not active on patient's medication list      Airam Delaney RN  Central Triage Red Flags/Med Refills

## 2022-04-25 ENCOUNTER — TELEPHONE (OUTPATIENT)
Dept: OPHTHALMOLOGY | Facility: CLINIC | Age: 52
End: 2022-04-25
Payer: COMMERCIAL

## 2022-04-25 NOTE — TELEPHONE ENCOUNTER
PRIOR AUTHORIZATION DENIED    Medication: VUITY 1.25 % SOLN    Denial Date: 4/25/2022    Denial Rational: Needs to try/fail Pilocarpine. Per med list in [facility] this was already sent to pharmacy as alternative today        Appeal Information: This medication was denied. If physician would like to appeal because patient has contraindication or allergy to covered medication please write letter of medical necessity and route back to PA team to initiate.  If no further action is needed please close encounter thank you.

## 2022-04-25 NOTE — TELEPHONE ENCOUNTER
[facility] Prior Authorization Team Request    Medication: VUITY 1.25 % SOLN  Dosing:   NDC (required for Medicaid members):     Insurance   BIN: [id number]  PCN: [facility]  Grp: [id number]  ID: [id number]      CoverMyMeds Key (if applicable):     Additional documentation:     Filling Pharmacy: [facility]  Phone Number: [phone]  Contact: [facility] [employer] PA (P [id number]) please send all responses to this pool.  Pharmacy NPI (required for Medicaid members):

## 2022-04-25 NOTE — TELEPHONE ENCOUNTER
Central Prior Authorization Team   Phone: [phone]    PA Initiation    Medication: VUITY 1.25 % SOLN  Insurance Company: [employer] - Phone [phone] Fax [phone]  Pharmacy Filling the Rx: [facility] [place], [place] - [address] [place] [address] [id number]  Filling Pharmacy Phone: [phone]  Filling Pharmacy Fax:    Start Date: 4/25/2022

## 2022-04-26 NOTE — TELEPHONE ENCOUNTER
Not considered medically necessary with insurance.  About 80 dollars out of pocket a bottle if patients would like to try.    Phil Young RN 7:03 AM 04/26/22

## 2022-05-17 ENCOUNTER — MYC MEDICAL ADVICE (OUTPATIENT)
Dept: PHYSICAL MEDICINE AND REHAB | Facility: CLINIC | Age: 52
End: 2022-05-17
Payer: COMMERCIAL

## 2022-05-17 ENCOUNTER — MYC MEDICAL ADVICE (OUTPATIENT)
Dept: PULMONOLOGY | Facility: CLINIC | Age: 52
End: 2022-05-17
Payer: COMMERCIAL

## 2022-05-19 ENCOUNTER — TELEPHONE (OUTPATIENT)
Dept: PULMONOLOGY | Facility: CLINIC | Age: 52
End: 2022-05-19
Payer: COMMERCIAL

## 2022-05-19 NOTE — TELEPHONE ENCOUNTER
LVM with direct call back to discuss that Dr. Encinas has filled out short term disability forms and trying to determine how to return them to her (I.e. via mail, email or fax). Will also send [employer] message.

## 2022-05-22 DIAGNOSIS — F41.1 ANXIETY STATE: ICD-10-CM

## 2022-05-22 DIAGNOSIS — F32.0 MILD MAJOR DEPRESSION (H): ICD-10-CM

## 2022-05-24 RX ORDER — PAROXETINE 20 MG/1
20 TABLET, FILM COATED ORAL EVERY MORNING
Qty: 90 TABLET | Refills: 3 | Status: SHIPPED | OUTPATIENT
Start: 2022-05-24 | End: 2023-06-15

## 2022-05-25 NOTE — TELEPHONE ENCOUNTER
Correction PHQ9 completed.  PHQ-9 score:    PHQ 5/24/2022   PHQ-9 Total Score 4   Q9: Thoughts of better off dead/self-harm past 2 weeks Not at all

## 2022-06-07 ENCOUNTER — OFFICE VISIT (OUTPATIENT)
Dept: PULMONOLOGY | Facility: CLINIC | Age: 52
End: 2022-06-07
Attending: INTERNAL MEDICINE
Payer: COMMERCIAL

## 2022-06-07 VITALS
RESPIRATION RATE: 17 BRPM | HEIGHT: 69 IN | SYSTOLIC BLOOD PRESSURE: 122 MMHG | DIASTOLIC BLOOD PRESSURE: 77 MMHG | WEIGHT: 185 LBS | OXYGEN SATURATION: 98 % | HEART RATE: 69 BPM | BODY MASS INDEX: 27.4 KG/M2

## 2022-06-07 DIAGNOSIS — R06.09 DYSPNEA ON EXERTION: ICD-10-CM

## 2022-06-07 DIAGNOSIS — R06.02 SHORTNESS OF BREATH: Primary | ICD-10-CM

## 2022-06-07 PROCEDURE — 99213 OFFICE O/P EST LOW 20 MIN: CPT | Mod: GC | Performed by: INTERNAL MEDICINE

## 2022-06-07 PROCEDURE — 94375 RESPIRATORY FLOW VOLUME LOOP: CPT | Performed by: INTERNAL MEDICINE

## 2022-06-07 PROCEDURE — G0463 HOSPITAL OUTPT CLINIC VISIT: HCPCS | Mod: 25

## 2022-06-07 ASSESSMENT — ASTHMA QUESTIONNAIRES
ACT_TOTALSCORE: 18
ACT_TOTALSCORE: 18
QUESTION_2 LAST FOUR WEEKS HOW OFTEN HAVE YOU HAD SHORTNESS OF BREATH: MORE THAN ONCE A DAY
QUESTION_1 LAST FOUR WEEKS HOW MUCH OF THE TIME DID YOUR ASTHMA KEEP YOU FROM GETTING AS MUCH DONE AT WORK, SCHOOL OR AT HOME: NONE OF THE TIME
QUESTION_4 LAST FOUR WEEKS HOW OFTEN HAVE YOU USED YOUR RESCUE INHALER OR NEBULIZER MEDICATION (SUCH AS ALBUTEROL): ONCE A WEEK OR LESS
QUESTION_5 LAST FOUR WEEKS HOW WOULD YOU RATE YOUR ASTHMA CONTROL: SOMEWHAT CONTROLLED
QUESTION_3 LAST FOUR WEEKS HOW OFTEN DID YOUR ASTHMA SYMPTOMS (WHEEZING, COUGHING, SHORTNESS OF BREATH, CHEST TIGHTNESS OR PAIN) WAKE YOU UP AT NIGHT OR EARLIER THAN USUAL IN THE MORNING: NOT AT ALL

## 2022-06-07 ASSESSMENT — PAIN SCALES - GENERAL: PAINLEVEL: NO PAIN (0)

## 2022-06-07 NOTE — LETTER
"    6/7/2022         RE: Hailey Stark  [address]  [facility] [address]        Dear Colleague,    Thank you for referring your patient, Hailey Stark, to the [facility] [place]. Please see a copy of my visit note below.    Reason for Visit  Hailey Stark is a 52 year old year old female who is being seen for 4 month f/u.   HPI    Hailey Stark    Last time seen was on 2/2022, continued symptoms of SOB, SIFUENTES, brain fog and fatigue after COVID infection. Symptoms had been prolonged; was back to work full time but had significant fatigue after work. All pulmonary work-up had been negative including CXR, spirometry and ECHO.  Abnormal flow volume loops were suggestive of VCD, recommended repeat testing to assess. Pt was to discuss with HR regarding short term disability and referral, f/u with PMR, repeat spirometry, SLP referral for VCD as inspiratory and expiratory loops were abnormal on PFTs on 4/21/22, with f/u in 2 months, but no space available to make appt.    She took the month of June off (using PTO). 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 She feels frustrated, and has never called in late for work off, never late. \"I don't feel depressed, I feel tired. And I'm upset at myself for not wanting to get out of bed. I try my best to get myself to do things. And now i'm exhausted and I feel bad because I feel lazy.\" When she is moving and walking, she feels like her HR increases and she has mid chest pressure like pain. She though also has chest pain/pressure at baseline, it just intensifies/gets heavier with some exertion like cutting grass. SOB gets worse with HR increasing. Takes wellbutrin and paxil daily. " Uses only albuterol, 1x per week usually. Does not use advair because it is expensive. Did not see a psychiatrist ever.    Denied fevers, chills, cough, sputum.     Was seen by cardiologist on 4/2022 with concerns about HR going up to 140s, and was ordered zio patch and echo (due next week), will see cardiologist Dr. Fried in a week. Her zio patch was mostly sinus tachycardia, ectopy was rare.     PFT from 4/2021 showed  inspiratory and expiratory loops were abnormal on PFTs, but PFT today was fine.       Current Outpatient Medications   Medication     albuterol (PROAIR HFA/PROVENTIL HFA/VENTOLIN HFA) 108 (90 Base) MCG/ACT inhaler     buPROPion (WELLBUTRIN XL) 150 MG 24 hr tablet     fluticasone-salmeterol (ADVAIR) 250-50 MCG/DOSE inhaler     ibuprofen (ADVIL,MOTRIN) 800 MG tablet     levothyroxine (SYNTHROID/LEVOTHROID) 50 MCG tablet     PARoxetine (PAXIL) 20 MG tablet     Pilocarpine HCl 1.25 % SOLN     No current facility-administered medications for this visit.     Allergies   Allergen Reactions     Meloxicam      Tongue swelling     Past Medical History:   Diagnosis Date     Anxiety state, unspecified     Anxiety     Cardiac dysrhythmia, unspecified     Arrhythmia NOS s/p ablation     Chronic peptic ulcer, unspecified site, without mention of hemorrhage, perforation, or obstruction     Ulcer, Peptic     Depressive disorder, not elsewhere classified     Depression (non-psychotic)     Leukorrhea, not specified as infective 11/18/2009    heavy, daily, yellow/green mucoid dischg following retained tampon removal.Tx w flagyl, metrogel, Cleocin, Diflucan, doxycycline, boric acid capsules. 5/16/2011 + GBS.      Menarche age 13    cycles q 30 x 5 d, heavy     Moderate dysplasia of cervix (KENNY II) 1998     Normal Papssince LEEP->routine screening       Past Surgical History:   Procedure Laterality Date     CARDIAC SURGERY  2000    Catheter ablation     Cervical Conization Loop Electrode Excision  1998    KENNY II  F/U  Pap q 3 mo x 2 yrs were all NORMAL     COSMETIC SURGERY  2005& 2006    Liposuction     EYE SURGERY  2006    Lasix     KIDNEY SURGERY  summer 2012    6 kidney stone excision/stent placed     LASIK       ORTHOPEDIC SURGERY  2012    Bunionectomy     SURGICAL HISTORY OF -       lasik     SURGICAL HISTORY OF -       catheter ablation heart atrial fib tx     VASCULAR SURGERY  Feb. 2020    Adhesive ablation       Social History     Socioeconomic History     Marital status: Single     Spouse name: Not on file     Number of children: Not on file     Years of education: Not on file     Highest education level: Not on file   Occupational History     Not on file   Tobacco Use     Smoking status: Never Smoker     Smokeless tobacco: Never Used   Substance and Sexual Activity     Alcohol use: Not Currently     Comment: 1-2 beers 1-2Xs/mo     Drug use: No     Sexual activity: Yes     Partners: Male     Birth control/protection: None   Other Topics Concern     Parent/sibling w/ CABG, MI or angioplasty before 65F 55M? No      Service No     Blood Transfusions No     Caffeine Concern No     Comment: 2 s/d     Occupational Exposure Yes     Comment: LPN at  Center     Hobby Hazards No     Sleep Concern Yes     Comment: Sleeps too much -- tired after 11 hr sleep     Stress Concern No     Comment: work; $ bills-->coping     Weight Concern Yes     Comment: gained 30 lb on Paxil     Special Diet Yes     Comment: vegetarian     Back Care Yes     Comment: lower back injury w chronic stiffness     Exercise Yes     Comment: sedentary     Bike Helmet Yes     Seat Belt No     Self-Exams No   Social History Narrative    Dairy/d 5 servings/d.     Caffeine 0 servings/d    Exercise 4 x week    Sunscreen used - Yes    Seatbelts used - Yes    Working smoke/CO detectors in the home - Yes    Guns stored in the home - No    Self Breast Exams - No    Self Testicular Exam - NA    Eye Exam up to date - Yes    Dental Exam up to date - No    Pap  "Smear up to date - Yes    Mammogram up to date - NA    PSA up to date - NA    Dexa Scan up to date - NA    Flex Sig / Colonoscopy up to date - Yes    Immunizations up to date - Yes    Abuse: Current or Past(Physical, Sexual or Emotional)- No    Do you feel safe in your environment - Yes         Social Determinants of Health     Financial Resource Strain: Not on file   Food Insecurity: Not on file   Transportation Needs: Not on file   Physical Activity: Not on file   Stress: Not on file   Social Connections: Not on file   Intimate Partner Violence: Not on file   Housing Stability: Not on file       Family History   Problem Relation Age of Onset     Thyroid Disease Mother         removed     Cancer Paternal Grandmother         stomach cancer     Alzheimer Disease Paternal Grandmother      Depression Sister      Thyroid Disease Sister      Thyroid Disease Sister         on meds     Diabetes Brother      Depression Brother      Diabetes Nephew      Glaucoma No family hx of      Macular Degeneration No family hx of        ROS Pulmonary  A complete ROS was otherwise negative except as noted in the HPI.  Vitals:    06/07/22 0910   BP: 122/77   Pulse: 69   Resp: 17   SpO2: 98%   Weight: 83.9 kg (185 lb)   Height: 1.753 m (5' 9\")     Exam:   GENERAL APPEARANCE: Well developed, well nourished, alert, and in no apparent distress.  EYES: EOMI  HENT: NC. AT.  NECK: supple, no masses, no thyromegaly.  LYMPHATICS: No significant axillary, cervical, or supraclavicular nodes.  RESP: good air flow throughout, - no crackles, rhonchi or wheezes.  CV: Normal S1, S2, regular rhythm, normal rate, no rub, no murmur,  no gallop, no LE edema.   ABDOMEN:  Bowel sounds normal, soft, nontender, no HSM or masses.   MS: extremities normal- no clubbing, no cyanosis.  SKIN: no rash on limited exam  NEURO: Mentation intact, speech normal, normal strength and tone, normal gait and stance  PSYCH: mentation appears normal. and affect " "normal/bright  Results: I have reviewed all imaging, PFTs and other relavent tests, please see below for details, PFT and imaging results were reviewed with the patient.    Assessment and plan:    Post-COVID   H/o MDD  She took the month of June off (using PTO). 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 She feels frustrated, and has never called in late for work off, never late. \"I don't feel depressed, I feel tired. And I'm upset at myself for not wanting to get out of bed. I try my best to get myself to do things. And now i'm exhausted and I feel bad because I feel lazy.\" When she is moving and walking, she feels like her HR increases and she has mid chest pressure like pain. She though also has chest pain/pressure at baseline, it just intensifies/gets heavier with some exertion like cutting grass. SOB gets worse with HR increasing. Takes wellbutrin and paxil daily. Uses only albuterol, 1x per week usually. Does not use advair because it is expensive. Did not see a psychiatrist ever.    Denied fevers, chills, cough, sputum.     Was seen by cardiologist on 4/2022 with concerns about HR going up to 140s, and was ordered zio patch and echo (due next week), will see cardiologist Dr. Fried in a week. Her zio patch was mostly sinus tachycardia, ectopy was rare.     PFT from 4/2021 showed  inspiratory and expiratory loops were abnormal on PFTs, but PFT today was fine (no signs of VCD).  Informed pt that she had no pulmonary etiology to explain her fatigue. Her psych/MDD sxs are quite significant.     Plan:  - highly encouraged pt to see psychiatry/psychology to help with her HR   - pt to see cardiologist in a week      Attending note:  Patient seen, examined and discussed with Dr." Calin. All data reviewed. Agree with the assessment and plan as outlined in the above note.  We discussed her continuing symptoms and need to be evaluated by a mental health professional to facilitate her short term disability.    Nila Encinas MD  [phone]      CBC   Recent Labs   Lab Test 04/12/22 1847 01/31/22  1630   RBC 4.24 4.67   HGB 11.9 12.9   HCT 36.4 39.7    347       Basic Metabolic Panel  Recent Labs   Lab Test 04/12/22 1847 01/31/22  1629    138   POTASSIUM 3.7 4.4   CHLORIDE 106 106   CO2 26 26   BUN 10 10   GLC 88 91  91   JENIFER 8.5 8.7       INR  No results for input(s): INR in the last 29510 hours.    PFT  PFT Latest Ref Rng & Units 6/7/2022   FVC L 3.88   FEV1 L 3.12   FVC% % 95   FEV1% % 96           This patient was staffed and discussed with Dr. Encinas.    Phoebe Layton MD  PGY3  IM resident  [phone]        Again, thank you for allowing me to participate in the care of your patient.        Sincerely,        Satinder Encinas MD

## 2022-06-07 NOTE — NURSING NOTE
Chief Complaint   Patient presents with     RECHECK     Return 2 month follow up     Medications reviewed and vital signs taken.   Shameka Mcdonough CMA

## 2022-06-08 LAB
EXPTIME-PRE: 8.18 SEC
FEF2575-%PRED-PRE: 111 %
FEF2575-PRE: 3.32 L/SEC
FEF2575-PRED: 2.98 L/SEC
FEFMAX-%PRED-PRE: 69 %
FEFMAX-PRE: 5.23 L/SEC
FEFMAX-PRED: 7.5 L/SEC
FEV1-%PRED-PRE: 96 %
FEV1-PRE: 3.12 L
FEV1FEV6-PRE: 80 %
FEV1FEV6-PRED: 82 %
FEV1FVC-PRE: 80 %
FEV1FVC-PRED: 80 %
FIFMAX-PRE: 4.85 L/SEC
FVC-%PRED-PRE: 95 %
FVC-PRE: 3.88 L
FVC-PRED: 4.06 L

## 2022-06-13 ENCOUNTER — OFFICE VISIT (OUTPATIENT)
Dept: CARDIOLOGY | Facility: CLINIC | Age: 52
End: 2022-06-13
Attending: INTERNAL MEDICINE
Payer: COMMERCIAL

## 2022-06-13 VITALS
SYSTOLIC BLOOD PRESSURE: 146 MMHG | WEIGHT: 186.8 LBS | OXYGEN SATURATION: 98 % | BODY MASS INDEX: 27.67 KG/M2 | HEART RATE: 68 BPM | DIASTOLIC BLOOD PRESSURE: 85 MMHG | HEIGHT: 69 IN

## 2022-06-13 DIAGNOSIS — R00.0 TACHYCARDIA: ICD-10-CM

## 2022-06-13 DIAGNOSIS — R00.2 PALPITATIONS: Primary | ICD-10-CM

## 2022-06-13 LAB — LVEF ECHO: NORMAL

## 2022-06-13 PROCEDURE — 93005 ELECTROCARDIOGRAM TRACING: CPT

## 2022-06-13 PROCEDURE — 99213 OFFICE O/P EST LOW 20 MIN: CPT | Mod: 25 | Performed by: INTERNAL MEDICINE

## 2022-06-13 PROCEDURE — 93306 TTE W/DOPPLER COMPLETE: CPT | Performed by: INTERNAL MEDICINE

## 2022-06-13 PROCEDURE — G0463 HOSPITAL OUTPT CLINIC VISIT: HCPCS | Mod: 25

## 2022-06-13 RX ORDER — AMOXICILLIN 500 MG/1
CAPSULE ORAL
COMMUNITY
Start: 2022-06-10 | End: 2022-07-13

## 2022-06-13 ASSESSMENT — PAIN SCALES - GENERAL: PAINLEVEL: MODERATE PAIN (5)

## 2022-06-13 NOTE — PROGRESS NOTES
I am delighted to see Ms Stark as a new patient in cardiology clinic for evaluation of     History of Present Illness:  Hailey Stark is a 51 year old female with a PMH of pectus excavatum, AVNRT s/p prior ablation (2000) in [facility] in , chronic peptic ulcer, kidney stones, cervical dysplasia, and h/o prior COVID infection (Nov 2020), who was complaining of palpitation and tachycardia since her COVID infection in 2020, she visited cardiologist Jared Barreto who asked her to do a ZIO (08/2021) which showed few runs of junctional beats, no other phil or tachyarrhythmia. So since then she has been having palpitation with a HR that goes up to 120s however recently in 4/12/22 she felt palpitation got her pulse at home was up to 139, she was advised to go to the ER, in the ER her HR was 70s and she was asymptomatic, was given an appointment to come to EP clinic.   - Had Holter monitor Aug 2021. Report reviewed in EMR: No V. tach, no pauses, no second-degree Mobitz 2 AV block or third-degree AV block, no A. fib, no supraventricular tachycardia. When recorded symptoms in her diarrhea she was either in sinus rhythm or junctional rhythm.   - Echocardiogram in September 2021 had good EF of 55-60%, global RV function normal, normal LV filling pressures, normal IVC size and respiratory variability, no obvious valvular abnormality, and it was unchanged from 2009.  They thought her dyspnea to be unlikely cardiac in origin based on that study.  - EKG on 4/12/22: inverted P wave seen in lead 3 and AVF.     She took a repeat TTE and Zio patch and returned to the clinic today.    Past Medical History:  - AVNRT s/p ablation in 2000 in [facility] by Dr Ruy Gomes.  - Chronic peptic ulcer, kidney stones, cervical dysplasia, and h/o prior COVID infection (Nov 2020),     Medications:   -Albuterol (PROAIR HFA/PROVENTIL HFA/VENTOLIN HFA) 108 (90 Base) MCG/ACT inhaler  buPROPion (WELLBUTRIN XL) 150 MG 24 hr  tablet  fluticasone-salmeterol (ADVAIR) 250-50 MCG/DOSE inhaler  ibuprofen (ADVIL,MOTRIN) 800 MG tablet  levothyroxine (SYNTHROID/LEVOTHROID) 50 MCG tablet  PARoxetine (PAXIL) 20 MG tablet    Allergies: Meloxicam    Never smoked does not use abuse alcohol or recreational drugs.   Pertinent review of systems in additional to listed in HPI:  Review of Systems   Constitutional: Positive for fatigue (chronic).   Respiratory: Negative for cough.    Cardiovascular: Positive for chest pain (pressure, chronic) and palpitations (on excertion). Negative for leg swelling.   Gastrointestinal: Negative for abdominal pain, diarrhea, nausea and vomiting.   Genitourinary: Negative.    Musculoskeletal: Negative for back pain, neck pain and neck stiffness.   Skin: Negative.    Neurological: Negative for dizziness, syncope and light-headedness.   All other systems reviewed and are negative.         Physical examination  Vitals: P: 68 BP:132/80  Constitutional: In general, the patient is a pleasant in no acute distress.    Cardiovascular: Carotids +2/2 bilaterally.  No jugular venous distension. Regular rate and rhythm. Normal S1, S2. No murmur, rub, click, or gallop.   Extremities: Pulses are normal bilaterally throughout. No peripheral edema.  Respiratory: Clear to asculation.  No ronchi, wheezes, rales.  No dullness to percussion.       I have personally and independently reviewed the following:  Labs: Reviewed    Echo: 9/3/2021  Interpretation Summary  Global and regional left ventricular function is normal with an EF of 55-60%.  Global right ventricular function is normal.  Diastolic Doppler findings (E/E' ratio and/or other parameters) suggest left  ventricular filling pressures are normal.  The inferior vena cava was normal in size with preserved respiratory  variability.  No significant valvular abnormalities were noted.     This study was compared with the study from 2009 .There has been no change.    Angiogram: none    Stress  test: none    EKG: sinus at 68, normal EKG.     Patch monitor:   Had Holter monitor Aug 2021. Report reviewed in EMR: No V. tach, no pauses, no second-degree Mobitz 2 AV block or third-degree AV block, no A. fib, no supraventricular tachycardia. When recorded symptoms in her diarrhea she was either in sinus rhythm or junctional rhythm.       TTE on 6/13/2022:M Reviewed.  Interpretation Summary  Technically difficult study.Extremely poor acoustic windows.  Global and regional left ventricular function is normal with an EF of 60-65%.  Right ventricular function, chamber size, wall motion, and thickness are  normal.  Pulmonary artery systolic pressure cannot be assessed.  The inferior vena cava is normal.  No pericardial effusion is present.    Zio patch in 5/2022: Reviewed.          ECG on 6/13/2022: Reviewed.        Assessment :  Hailey Stark is a 51 year old female with a PMH of pectus excavatum, AVNRT s/p prior ablation (2000) in [facility] in , chronic peptic ulcer, kidney stones, cervical dysplasia, and h/o prior COVID infection (Nov 2020), who was complaining of palpitation and tachycardia since her COVID infection in 2020, she visited cardiologist Jared Barreto who asked her to do a ZIO (08/2021) which showed few runs of junctional beats, no other phil or tachyarrhythmia. So since then she has been having palpitation with a HR that goes up to 120s however recently in 4/12/22 she felt palpitation got her pulse at home was up to 139, she was advised to go to the ER, in the ER her HR was 70s and she was asymptomatic, was given an appointment to come to EP clinic.   - Had Holter monitor Aug 2021. Report reviewed in EMR: No V. tach, no pauses, no second-degree Mobitz 2 AV block or third-degree AV block, no A. fib, no supraventricular tachycardia. When recorded symptoms in her diarrhea she was either in sinus rhythm or junctional rhythm.   - Echocardiogram in September 2021 had good EF of 55-60%, global RV  function normal, normal LV filling pressures, normal IVC size and respiratory variability, no obvious valvular abnormality, and it was unchanged from 2009.  They thought her dyspnea to be unlikely cardiac in origin based on that study.  - EKG on 4/12/22: inverted P wave seen in lead 3 and AVF.     Today she has no new complains, she always feels pressure on her chest and sob on exertion. Has done an EKG sinus at 68, with P wave are upright in inferior leads.    #- Palpitation:  - Pt has palpitation HR goes up to 140, unclear what rhythm or QRS morphology of tachycardia, no EKG seen with tachycardia, no demonstration on the old ZIO (done on 8/2021) except for short Run of possible junctional beats which does not explain the symptoms, also the EKG seen with inverted T wave in 3 and AVF on (4/12/21) could be a variant of normal.   - Differential include sinus tachycardia vs possible SVT.  - Plan: will repeat echo and zio patch for 14 days and will f/u in 1 month.     Zio in 5/2022 revealed short SVT runs only. TTE on 6/13/2022 was WNL.  Therefore, her symptoms are unlikely to be cardiogenic.  I advised her to observe at this point.    No regular follow up will be required.    I spent a total of 20 min today to review the records, see the patient, and complete the documents.

## 2022-06-13 NOTE — PATIENT INSTRUCTIONS
You were seen in the Electrophysiology Clinic today by: Dr Fried    Plan:       Follow up visit:  As needed with Dr Fried        Your Care Team:  EP Cardiology   Telephone Number     Nurse Line  Marlene Olsen RN  [phone]     For scheduling appts or procedures:    Sarai Ashley   [phone]   For the Device Clinic (Pacemakers, ICDs, Loop Recorders)    During business hours: [phone]  After business hours:   [phone]- select option 4 and ask for job code [id number].     On-call cardiologist for after hours or on weekends: [phone], option #4, and ask to speak to the on-call cardiologist.     Cardiovascular Clinic:   [address] [facility] [place] [address]      As always, Thank you for trusting us with your health care needs!

## 2022-06-13 NOTE — NURSING NOTE
Chief Complaint   Patient presents with     Follow Up     Return EP- 2 mo f/u for palpitations     Vitals were taken, medications reconciled, and EKG was performed.    AMARI Betancourt  4:35 PM

## 2022-06-13 NOTE — LETTER
6/13/2022      RE: Hailey Stark  [address]  [facility] [address]       Dear Colleague,    Thank you for the opportunity to participate in the care of your patient, Hailey Stark, at the [facility] HEART CLINIC [place] at [facility]. Please see a copy of my visit note below.    I am delighted to see Ms Stark as a new patient in cardiology clinic for evaluation of     History of Present Illness:  Hailey Stark is a 51 year old female with a PMH of pectus excavatum, AVNRT s/p prior ablation (2000) in [facility] in , chronic peptic ulcer, kidney stones, cervical dysplasia, and h/o prior COVID infection (Nov 2020), who was complaining of palpitation and tachycardia since her COVID infection in 2020, she visited cardiologist Jared Barreto who asked her to do a ZIO (08/2021) which showed few runs of junctional beats, no other phil or tachyarrhythmia. So since then she has been having palpitation with a HR that goes up to 120s however recently in 4/12/22 she felt palpitation got her pulse at home was up to 139, she was advised to go to the ER, in the ER her HR was 70s and she was asymptomatic, was given an appointment to come to EP clinic.   - Had Holter monitor Aug 2021. Report reviewed in EMR: No V. tach, no pauses, no second-degree Mobitz 2 AV block or third-degree AV block, no A. fib, no supraventricular tachycardia. When recorded symptoms in her diarrhea she was either in sinus rhythm or junctional rhythm.   - Echocardiogram in September 2021 had good EF of 55-60%, global RV function normal, normal LV filling pressures, normal IVC size and respiratory variability, no obvious valvular abnormality, and it was unchanged from 2009.  They thought her dyspnea to be unlikely cardiac in origin based on that study.  - EKG on 4/12/22: inverted P wave seen in lead 3 and AVF.     She took a repeat TTE and Zio patch and returned to the clinic today.    Past  Medical History:  - AVNRT s/p ablation in 2000 in [facility] by Dr Ruy Gomes.  - Chronic peptic ulcer, kidney stones, cervical dysplasia, and h/o prior COVID infection (Nov 2020),     Medications:   -Albuterol (PROAIR HFA/PROVENTIL HFA/VENTOLIN HFA) 108 (90 Base) MCG/ACT inhaler  buPROPion (WELLBUTRIN XL) 150 MG 24 hr tablet  fluticasone-salmeterol (ADVAIR) 250-50 MCG/DOSE inhaler  ibuprofen (ADVIL,MOTRIN) 800 MG tablet  levothyroxine (SYNTHROID/LEVOTHROID) 50 MCG tablet  PARoxetine (PAXIL) 20 MG tablet    Allergies: Meloxicam    Never smoked does not use abuse alcohol or recreational drugs.   Pertinent review of systems in additional to listed in HPI:  Review of Systems   Constitutional: Positive for fatigue (chronic).   Respiratory: Negative for cough.    Cardiovascular: Positive for chest pain (pressure, chronic) and palpitations (on excertion). Negative for leg swelling.   Gastrointestinal: Negative for abdominal pain, diarrhea, nausea and vomiting.   Genitourinary: Negative.    Musculoskeletal: Negative for back pain, neck pain and neck stiffness.   Skin: Negative.    Neurological: Negative for dizziness, syncope and light-headedness.   All other systems reviewed and are negative.         Physical examination  Vitals: P: 68 BP:132/80  Constitutional: In general, the patient is a pleasant in no acute distress.    Cardiovascular: Carotids +2/2 bilaterally.  No jugular venous distension. Regular rate and rhythm. Normal S1, S2. No murmur, rub, click, or gallop.   Extremities: Pulses are normal bilaterally throughout. No peripheral edema.  Respiratory: Clear to asculation.  No ronchi, wheezes, rales.  No dullness to percussion.       I have personally and independently reviewed the following:  Labs: Reviewed    Echo: 9/3/2021  Interpretation Summary  Global and regional left ventricular function is normal with an EF of 55-60%.  Global right ventricular function is normal.  Diastolic Doppler findings (E/E'  ratio and/or other parameters) suggest left  ventricular filling pressures are normal.  The inferior vena cava was normal in size with preserved respiratory  variability.  No significant valvular abnormalities were noted.     This study was compared with the study from 2009 .There has been no change.    Angiogram: none    Stress test: none    EKG: sinus at 68, normal EKG.     Patch monitor:   Had Holter monitor Aug 2021. Report reviewed in EMR: No V. tach, no pauses, no second-degree Mobitz 2 AV block or third-degree AV block, no A. fib, no supraventricular tachycardia. When recorded symptoms in her diarrhea she was either in sinus rhythm or junctional rhythm.       TTE on 6/13/2022:M Reviewed.  Interpretation Summary  Technically difficult study.Extremely poor acoustic windows.  Global and regional left ventricular function is normal with an EF of 60-65%.  Right ventricular function, chamber size, wall motion, and thickness are  normal.  Pulmonary artery systolic pressure cannot be assessed.  The inferior vena cava is normal.  No pericardial effusion is present.    Zio patch in 5/2022: Reviewed.          ECG on 6/13/2022: Reviewed.        Assessment :  Hailey Stark is a 51 year old female with a PMH of pectus excavatum, AVNRT s/p prior ablation (2000) in [facility] in , chronic peptic ulcer, kidney stones, cervical dysplasia, and h/o prior COVID infection (Nov 2020), who was complaining of palpitation and tachycardia since her COVID infection in 2020, she visited cardiologist Jared Barreto who asked her to do a ZIO (08/2021) which showed few runs of junctional beats, no other phil or tachyarrhythmia. So since then she has been having palpitation with a HR that goes up to 120s however recently in 4/12/22 she felt palpitation got her pulse at home was up to 139, she was advised to go to the ER, in the ER her HR was 70s and she was asymptomatic, was given an appointment to come to EP clinic.   - Had Holter  monitor Aug 2021. Report reviewed in EMR: No V. tach, no pauses, no second-degree Mobitz 2 AV block or third-degree AV block, no A. fib, no supraventricular tachycardia. When recorded symptoms in her diarrhea she was either in sinus rhythm or junctional rhythm.   - Echocardiogram in September 2021 had good EF of 55-60%, global RV function normal, normal LV filling pressures, normal IVC size and respiratory variability, no obvious valvular abnormality, and it was unchanged from 2009.  They thought her dyspnea to be unlikely cardiac in origin based on that study.  - EKG on 4/12/22: inverted P wave seen in lead 3 and AVF.     Today she has no new complains, she always feels pressure on her chest and sob on exertion. Has done an EKG sinus at 68, with P wave are upright in inferior leads.    #- Palpitation:  - Pt has palpitation HR goes up to 140, unclear what rhythm or QRS morphology of tachycardia, no EKG seen with tachycardia, no demonstration on the old ZIO (done on 8/2021) except for short Run of possible junctional beats which does not explain the symptoms, also the EKG seen with inverted T wave in 3 and AVF on (4/12/21) could be a variant of normal.   - Differential include sinus tachycardia vs possible SVT.  - Plan: will repeat echo and zio patch for 14 days and will f/u in 1 month.     Zio in 5/2022 revealed short SVT runs only. TTE on 6/13/2022 was WNL.  Therefore, her symptoms are unlikely to be cardiogenic.  I advised her to observe at this point.    No regular follow up will be required.    I spent a total of 20 min today to review the records, see the patient, and complete the documents.        Please do not hesitate to contact me if you have any questions/concerns.     Sincerely,     Radha Fried MD

## 2022-06-14 LAB
ATRIAL RATE - MUSE: 65 BPM
DIASTOLIC BLOOD PRESSURE - MUSE: NORMAL MMHG
INTERPRETATION ECG - MUSE: NORMAL
P AXIS - MUSE: NORMAL DEGREES
PR INTERVAL - MUSE: 134 MS
QRS DURATION - MUSE: 86 MS
QT - MUSE: 382 MS
QTC - MUSE: 397 MS
R AXIS - MUSE: -11 DEGREES
SYSTOLIC BLOOD PRESSURE - MUSE: NORMAL MMHG
T AXIS - MUSE: 1 DEGREES
VENTRICULAR RATE- MUSE: 65 BPM

## 2022-06-27 ENCOUNTER — MYC MEDICAL ADVICE (OUTPATIENT)
Dept: FAMILY MEDICINE | Facility: CLINIC | Age: 52
End: 2022-06-27

## 2022-06-27 DIAGNOSIS — R00.2 PALPITATIONS: Primary | ICD-10-CM

## 2022-06-28 NOTE — TELEPHONE ENCOUNTER
"Nacho-Please review patient's message and advise.    1. Writer planned on recommending patient direct this question to Cardiology.    2. Per 6/13/22 Cardiology visit:  \"#- Palpitation:  - Pt has palpitation HR goes up to 140, unclear what rhythm or QRS morphology of tachycardia, no EKG seen with tachycardia, no demonstration on the old ZIO (done on 8/2021) except for short Run of possible junctional beats which does not explain the symptoms, also the EKG seen with inverted T wave in 3 and AVF on (4/12/21) could be a variant of normal.   - Differential include sinus tachycardia vs possible SVT.  - Plan: will repeat echo and zio patch for 14 days and will f/u in 1 month.      Zio in 5/2022 revealed short SVT runs only. TTE on 6/13/2022 was WNL.  Therefore, her symptoms are unlikely to be cardiogenic.  I advised her to observe at this point.    No regular follow up will be required.\"    Thank you!  JOSUE TranN, RN  [facility]    "

## 2022-06-29 NOTE — TELEPHONE ENCOUNTER
Given the plan to repeat echo and zio monitor, I recommend discussing with Cardiology as the BB can alter the zio readings. I do agree a BB would be beneficial for anxiety and palpitations. Has she scheduled the follow up with cards?  SW

## 2022-06-29 NOTE — TELEPHONE ENCOUNTER
Writer responded via [employer].    JOSUE TranN, RN  [facility]

## 2022-06-30 RX ORDER — PROPRANOLOL HYDROCHLORIDE 20 MG/1
20 TABLET ORAL 3 TIMES DAILY
Qty: 30 TABLET | Refills: 1 | Status: SHIPPED | OUTPATIENT
Start: 2022-06-30 | End: 2022-07-21

## 2022-06-30 NOTE — TELEPHONE ENCOUNTER
Yes, please have her follow up with Cards. Per Dr. Fried's (sp) last note 6/13 he referenced repeating zio but I do not see a referral place. He reviewed the prior zio in 4/2022 and wanted to repeat. We can trial a BB but would recommend holding while on the zio to avoid interference.   I will place an order for Propranolol 20 mg TID prn.     SW

## 2022-06-30 NOTE — TELEPHONE ENCOUNTER
"Nacho-Please review patient's response.  Writer sees echo completed on 6/13/22 but does not find results for Zio monitor (all writer finds is 4/18/22 Zio monitor).  Do you defer to Cardiology for beta blocker?    \"I already had repeated the holter monitor and echo.those were the results    Cardiology just said to monitor and he doesn't know what is causing tachycardia but I want to feel better. I'm sick of these palpitations and I would like to try something before I go back to work mid July.\"    Writer responded via [employer].      Thank you!  JOSUE TranN, RN  [facility]    "

## 2022-06-30 NOTE — TELEPHONE ENCOUNTER
Writer responded via [employer].    JOSUE TranN, RN  [facility]

## 2022-07-08 ENCOUNTER — VIRTUAL VISIT (OUTPATIENT)
Dept: OTOLARYNGOLOGY | Facility: CLINIC | Age: 52
End: 2022-07-08
Payer: OTHER MISCELLANEOUS

## 2022-07-08 DIAGNOSIS — R06.02 SOB (SHORTNESS OF BREATH): ICD-10-CM

## 2022-07-08 DIAGNOSIS — R49.0 DYSPHONIA: Primary | ICD-10-CM

## 2022-07-08 DIAGNOSIS — J38.7 LARYNGEAL HYPERFUNCTION: ICD-10-CM

## 2022-07-08 PROCEDURE — 92507 TX SP LANG VOICE COMM INDIV: CPT | Mod: GN | Performed by: SPEECH-LANGUAGE PATHOLOGIST

## 2022-07-08 NOTE — LETTER
"7/8/2022       RE: Hailey Stark  [address]  [facility] [address]     Dear Colleague,    Thank you for referring your patient, Hailey Stark, to the [facility] VOICE CLINIC [place] at [facility]. Please see a copy of my visit note below.    Hailey Stark is a 52 year old female who is being cared for via a billable virtual visit.        The patient has been notified and verbally consented to the following statements:     This video visit will be conducted between you and your provider.    Patient has opted to conduct today's video visit vs an in-person appointment, and is not able to attend due to possible exposure to COVID-19.      If during the course of the call the provider feels a video visit is not appropriate, you will not be charged for this service.    Provider has received verbal consent for billable virtual visit from the patient? Yes    Preferred method for receiving information: expresscoinhart     Call initiated at: 8:03 AM  Platform used to conduct today's virtual appointment: [employer] Video  Location of provider: Residence  Location of patient: [facility] VOICE [facility]  THERAPY NOTE (CPT 71528)  Patient: Hailey Stark  Date of Service: 7/8/2022  Referring physician: Dr. 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    Remarkable findings included:  ? Perceptual evaluation demonstrated:   ? Roughness: Minimal  ? Breathiness: Minimal  ? Strain: WNL   ? Sounds short of breath and speaks with shorter phrases    Laryngeal exam demonstrated: mild to moderate supraglottic hyperfunction " "during connected speech tasks.     Primary complaint of patient today included: breathing difficulties  Therefore, we recommended a course of speech therapy to address these concerns.\"    SUBJECTIVE:  Since the last appointment, Ms. Stark reports the following:     Overall she reports that symptoms are improving    Breathing is better and 2/10    Up to 4 miles    Got up to 152, new heart medication.     PCP for heart medication. Propanolol    OBJECTIVE:  Ms. Stark presents today with the following:  BREATHING:   ? inspirations are inadequate in volume and frequency  ? clavicular elevation on inspiration  ? phonation is not consistently coordinated with respiration     LARYNGEAL PALPATION:   ? firm musculature  ? tenderness of the thyrohyoid area  ? reduced thyrohyoid space     VOICE:  ? Mi states that today is a typical voice today, with clinician observing voice quality to be characterized as:  ? Roughness: Minimal  ? Breathiness: Minimal  ? Strain: WNL   ? Sounds short of breath and speaks with shorter phrases  ? Pitch:  ? F0/ Habitual Pitch: 198 Hz 480-200 Hz    Pitch glide: neurologically normal    Maximum Phonation Time: limited at 6-7 seconds  ? Resonance:     Conversational speech:  laryngeal pharyngeal resonance  ? Loudness    Conversational speech:  Mildly reduced    Projected speech:  Mild to moderately reduced     PATIENT REPORTED MEASURES:  Patient Supplied Answers To SLP QOL Questionnaire  No flowsheet data found.  Speech follow up as discussed with patient:  Dysponia SLP Goals 7/8/2022   How much does your breathing problem bother you?         Somewhat       THERAPEUTIC ACTIVITIES    Demonstrated previous exercises.  o demonstrated improved technique  o appropriate redirection provided  o instruction provided for increased level of complexity/difficulty    Exercises to promote optimal respiratory mechanics    I provided explanation of the anatomy and physiology of respiration for speech and " "singing; she found this to be helpful    she demonstrated clavicular/neck/shoulder involvement in inhalation    Demonstrated difficulty allowing abdominal relaxation for inhalation    Practiced in a prone and supine position on the massage table, with tactile cue of a hand on the low rib-cage to facilitate awareness of low respiratory engagement.  Progressed to seated today.    With clinician support, patient was able to demonstrate improved abdominal relaxation and engagement on inhalation    Optimal exhalation using inward engagement of the abdominal wall with no corresponding collapse of the upper chest cavity was trained using the pulsed \"sh\" task    Villanueva a respiratory pacing exercise; this was helpful    Villanueva a pulsed breathing exercise; this was helpful    acceptable improvement in airflow and respiratory mechanics    Semi-Occluded Vocal Tract (SOVT) exercises instructed to reduce laryngeal tension, promote vocal fold pliability, and coordinate respiration and phonation    Straw phonation with water resistance was found to be most facilitating     Sustained phonation, and voice vs. voiceless productions used to promote easy voicing and raise awareness of laryngeal tension    Ascending and descending glides utilized to promote vocal fold pliability    \"Messa di voce\", gradual crescendo and decrescendo to vary medial compression was also utilized to promote vocal fold pliability.    Instructed on the importance of using these exercises as a warm-up / cool down,  and to re-calibrate the voice throughout the day.    Counseling and Education:    Asked many questions about the nature of her symptoms, and I answered all of these thoroughly.    A revised regimen for home practice was instructed.    I provided an AVS and handouts of today's therapeutic activities to facilitate practice.    ASSESSMENT/PLAN  PROGRESS TOWARD LONG TERM GOALS:   Adequate progress; too early for objective measures    IMPRESSIONS: " Shortness Of Breath (R06.02), Laryngeal Hyperfunction (J38.7), Dysphonia (R49.0) and imbalance in respiratory mechanics in the context of COVID-19 in November 2021.  Ms. Stark demonstrated good learning today of therapeutic activities to help optimize her respiratory mechanics and coordination with phonation.     PLAN: I will see Ms. Stark in 2 weeks, at which point we will continue thearpy.   For practice goals see AVS.     TOTAL SERVICE TIME:   Call Initiated at: 8:03 AM  Call Ended at: 9am           CPT Billing Codes:   TREATMENT (29653)  NO CHARGE FACILITY FEE (18162)    Juliette Talley M.M. (voice), M.A., CCC/SLP  Speech-Language Pathologist  [facility] Trained Vocologist  [facility] Voice [facility]  [phone]  [email or web address]  Pronouns: she/her      *this report was created in part through the use of computerized dictation software, and though reviewed following completion, some typographic errors may persist.  If there is confusion regarding any of this notes contents, please contact me for clarification
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
## 2022-07-08 NOTE — PROGRESS NOTES
"Hailey Stark is a 52 year old female who is being cared for via a billable virtual visit.        The patient has been notified and verbally consented to the following statements:     This video visit will be conducted between you and your provider.    Patient has opted to conduct today's video visit vs an in-person appointment, and is not able to attend due to possible exposure to COVID-19.      If during the course of the call the provider feels a video visit is not appropriate, you will not be charged for this service.    Provider has received verbal consent for billable virtual visit from the patient? Yes    Preferred method for receiving information: [employer]     Call initiated at: 8:03 AM  Platform used to conduct today's virtual appointment: [employer] [facility]  Location of provider: Residence  Location of patient: [facility] VOICE CLINIC  THERAPY NOTE (CPT 78755)  Patient: Hailey Stark  Date of Service: 7/8/2022  Referring physician: Dr. 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    Remarkable findings included:  ? Perceptual evaluation demonstrated:   ? Roughness: Minimal  ? Breathiness: Minimal  ? Strain: WNL   ? Sounds short of breath and speaks with shorter phrases    Laryngeal exam demonstrated: mild to moderate supraglottic hyperfunction during connected speech tasks.     Primary complaint of patient today included: breathing difficulties  Therefore, we recommended a course of speech therapy to address these concerns.\"    SUBJECTIVE:  Since the last appointment, Ms. Stark reports the following:     Overall she reports that symptoms are " improving    Breathing is better and 2/10    Up to 4 miles    Got up to 152, new heart medication.     PCP for heart medication. Propanolol    OBJECTIVE:  Ms. Stark presents today with the following:  BREATHING:   ? inspirations are inadequate in volume and frequency  ? clavicular elevation on inspiration  ? phonation is not consistently coordinated with respiration     LARYNGEAL PALPATION:   ? firm musculature  ? tenderness of the thyrohyoid area  ? reduced thyrohyoid space     VOICE:  ? Mi states that today is a typical voice today, with clinician observing voice quality to be characterized as:  ? Roughness: Minimal  ? Breathiness: Minimal  ? Strain: WNL   ? Sounds short of breath and speaks with shorter phrases  ? Pitch:  ? F0/ Habitual Pitch: 198 Hz 480-200 Hz    Pitch glide: neurologically normal    Maximum Phonation Time: limited at 6-7 seconds  ? Resonance:     Conversational speech:  laryngeal pharyngeal resonance  ? Loudness    Conversational speech:  Mildly reduced    Projected speech:  Mild to moderately reduced     PATIENT REPORTED MEASURES:  Patient Supplied Answers To SLP QOL Questionnaire  No flowsheet data found.  Speech follow up as discussed with patient:  Dysponia SLP Goals 7/8/2022   How much does your breathing problem bother you?         Somewhat       THERAPEUTIC ACTIVITIES    Demonstrated previous exercises.  o demonstrated improved technique  o appropriate redirection provided  o instruction provided for increased level of complexity/difficulty    Exercises to promote optimal respiratory mechanics    I provided explanation of the anatomy and physiology of respiration for speech and singing; she found this to be helpful    she demonstrated clavicular/neck/shoulder involvement in inhalation    Demonstrated difficulty allowing abdominal relaxation for inhalation    Practiced in a prone and supine position on the massage table, with tactile cue of a hand on the low rib-cage to facilitate  "awareness of low respiratory engagement.  Progressed to seated today.    With clinician support, patient was able to demonstrate improved abdominal relaxation and engagement on inhalation    Optimal exhalation using inward engagement of the abdominal wall with no corresponding collapse of the upper chest cavity was trained using the pulsed \"sh\" task    [place] a respiratory pacing exercise; this was helpful    [place] a pulsed breathing exercise; this was helpful    acceptable improvement in airflow and respiratory mechanics    Semi-Occluded Vocal Tract (SOVT) exercises instructed to reduce laryngeal tension, promote vocal fold pliability, and coordinate respiration and phonation    Straw phonation with water resistance was found to be most facilitating     Sustained phonation, and voice vs. voiceless productions used to promote easy voicing and raise awareness of laryngeal tension    Ascending and descending glides utilized to promote vocal fold pliability    \"Messa di voce\", gradual crescendo and decrescendo to vary medial compression was also utilized to promote vocal fold pliability.    Instructed on the importance of using these exercises as a warm-up / cool down,  and to re-calibrate the voice throughout the day.    Counseling and Education:    Asked many questions about the nature of her symptoms, and I answered all of these thoroughly.    A revised regimen for home practice was instructed.    I provided an AVS and handouts of today's therapeutic activities to facilitate practice.    ASSESSMENT/PLAN  PROGRESS TOWARD LONG TERM GOALS:   Adequate progress; too early for objective measures    IMPRESSIONS: Shortness Of Breath (R06.02), Laryngeal Hyperfunction (J38.7), Dysphonia (R49.0) and imbalance in respiratory mechanics in the context of COVID-19 in November 2021.  Ms. Stark demonstrated good learning today of therapeutic activities to help optimize her respiratory mechanics and coordination with phonation. "     PLAN: I will see Ms. Stark in 2 weeks, at which point we will continue thearpy.   For practice goals see AVS.     TOTAL SERVICE TIME:   Call Initiated at: 8:03 AM  Call Ended at: 9am           CPT Billing Codes:   TREATMENT (67034)  NO CHARGE FACILITY FEE (02135)    Juliette Talley M.M. (voice), M.A., CCC/SLP  Speech-Language Pathologist  [place] Trained Vocologist  [facility]  [phone]  [email or web address]  Pronouns: she/her      *this report was created in part through the use of computerized dictation software, and though reviewed following completion, some typographic errors may persist.  If there is confusion regarding any of this notes contents, please contact me for clarification

## 2022-07-11 ENCOUNTER — TELEPHONE (OUTPATIENT)
Dept: PHYSICAL MEDICINE AND REHAB | Facility: CLINIC | Age: 52
End: 2022-07-11

## 2022-07-13 ENCOUNTER — VIRTUAL VISIT (OUTPATIENT)
Dept: PHYSICAL MEDICINE AND REHAB | Facility: CLINIC | Age: 52
End: 2022-07-13
Payer: OTHER MISCELLANEOUS

## 2022-07-13 DIAGNOSIS — G93.32 POST-COVID CHRONIC FATIGUE: ICD-10-CM

## 2022-07-13 DIAGNOSIS — R00.2 POST-COVID CHRONIC PALPITATIONS: ICD-10-CM

## 2022-07-13 DIAGNOSIS — U09.9 COVID-19 LONG HAULER: Primary | ICD-10-CM

## 2022-07-13 DIAGNOSIS — U09.9 POST-COVID CHRONIC FATIGUE: ICD-10-CM

## 2022-07-13 DIAGNOSIS — U09.9 POST-COVID CHRONIC PALPITATIONS: ICD-10-CM

## 2022-07-13 PROCEDURE — 99215 OFFICE O/P EST HI 40 MIN: CPT | Mod: 95 | Performed by: STUDENT IN AN ORGANIZED HEALTH CARE EDUCATION/TRAINING PROGRAM

## 2022-07-13 SDOH — SOCIAL STABILITY: SOCIAL NETWORK: I HAVE TROUBLE DOING ALL OF MY USUAL WORK (INCLUDE WORK AT HOME): RARELY

## 2022-07-13 SDOH — SOCIAL STABILITY: SOCIAL NETWORK

## 2022-07-13 SDOH — SOCIAL STABILITY: SOCIAL NETWORK: I HAVE TROUBLE DOING ALL OF MY REGULAR LEISURE ACTIVITIES WITH OTHERS: RARELY

## 2022-07-13 SDOH — SOCIAL STABILITY: SOCIAL NETWORK: I HAVE TROUBLE DOING ALL OF THE FAMILY ACTIVITIES THAT I WANT TO DO: RARELY

## 2022-07-13 SDOH — SOCIAL STABILITY: SOCIAL NETWORK: PROMIS ABILITY TO PARTICIPATE IN SOCIAL ROLES & ACTIVITIES T-SCORE: 51.6

## 2022-07-13 SDOH — SOCIAL STABILITY: SOCIAL NETWORK: I HAVE TROUBLE DOING ALL OF THE ACTIVITIES WITH FRIENDS THAT I WANT TO DO: RARELY

## 2022-07-13 ASSESSMENT — ANXIETY QUESTIONNAIRES
6. BECOMING EASILY ANNOYED OR IRRITABLE: NOT AT ALL
4. TROUBLE RELAXING: NOT AT ALL
7. FEELING AFRAID AS IF SOMETHING AWFUL MIGHT HAPPEN: NOT AT ALL
3. WORRYING TOO MUCH ABOUT DIFFERENT THINGS: NOT AT ALL
GAD7 TOTAL SCORE: 0
5. BEING SO RESTLESS THAT IT IS HARD TO SIT STILL: NOT AT ALL
7. FEELING AFRAID AS IF SOMETHING AWFUL MIGHT HAPPEN: NOT AT ALL
2. NOT BEING ABLE TO STOP OR CONTROL WORRYING: NOT AT ALL
8. IF YOU CHECKED OFF ANY PROBLEMS, HOW DIFFICULT HAVE THESE MADE IT FOR YOU TO DO YOUR WORK, TAKE CARE OF THINGS AT HOME, OR GET ALONG WITH OTHER PEOPLE?: NOT DIFFICULT AT ALL
GAD7 TOTAL SCORE: 0
1. FEELING NERVOUS, ANXIOUS, OR ON EDGE: NOT AT ALL
GAD7 TOTAL SCORE: 0

## 2022-07-13 ASSESSMENT — ENCOUNTER SYMPTOMS
ORTHOPNEA: 0
DYSPNEA ON EXERTION: 1
COUGH: 0
WHEEZING: 0
WEIGHT GAIN: 0
SLEEP DISTURBANCES DUE TO BREATHING: 0
SNORES LOUDLY: 0
WEIGHT LOSS: 0
SHORTNESS OF BREATH: 1
POLYDIPSIA: 0
ALTERED TEMPERATURE REGULATION: 0
HEMOPTYSIS: 0
EXERCISE INTOLERANCE: 0
COUGH DISTURBING SLEEP: 0
LEG PAIN: 0
HALLUCINATIONS: 0
PALPITATIONS: 1
HYPOTENSION: 0
CHILLS: 0
NIGHT SWEATS: 0
POSTURAL DYSPNEA: 0
LIGHT-HEADEDNESS: 0
FEVER: 0
HYPERTENSION: 0
SYNCOPE: 0
SPUTUM PRODUCTION: 0
POLYPHAGIA: 0
FATIGUE: 1
DECREASED APPETITE: 0
INCREASED ENERGY: 0
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
## 2022-07-14 ENCOUNTER — TELEPHONE (OUTPATIENT)
Dept: INFECTIOUS DISEASES | Facility: CLINIC | Age: 52
End: 2022-07-14

## 2022-07-18 DIAGNOSIS — R00.2 PALPITATIONS: ICD-10-CM

## 2022-07-20 ENCOUNTER — DOCUMENTATION ONLY (OUTPATIENT)
Dept: PHYSICAL MEDICINE AND REHAB | Facility: CLINIC | Age: 52
End: 2022-07-20

## 2022-07-20 NOTE — PROGRESS NOTES
Endocrinology referral was removed - max attempts reached to contact patient.    Patient has been contacted to schedule 1 month follow-up with Dr. Genao.    Johnny Reis

## 2022-07-21 RX ORDER — PROPRANOLOL HYDROCHLORIDE 20 MG/1
20 TABLET ORAL 3 TIMES DAILY
Qty: 270 TABLET | Refills: 3 | Status: SHIPPED | OUTPATIENT
Start: 2022-07-21 | End: 2022-08-31

## 2022-07-21 NOTE — TELEPHONE ENCOUNTER
Nacho-Please review, sign if agree and may close encounter.    Routing refill request to provider for review/approval because:  BP elevated  BP Readings from Last 3 Encounters:   06/13/22 (!) 146/85   06/07/22 122/77   04/18/22 132/80     Last Written Prescription Date:  6/30/22  Last Fill Quantity: 30,  # refills: 1   Last office visit: 1/31/2022 with prescribing provider:  FEDERICO Lara CNP   Future Office Visit:        Thank you!  JOSUE TranN, RN  [facility]

## 2022-08-01 ENCOUNTER — VIRTUAL VISIT (OUTPATIENT)
Dept: OTOLARYNGOLOGY | Facility: CLINIC | Age: 52
End: 2022-08-01
Payer: OTHER MISCELLANEOUS

## 2022-08-01 DIAGNOSIS — J38.7 LARYNGEAL HYPERFUNCTION: ICD-10-CM

## 2022-08-01 DIAGNOSIS — R06.02 SOB (SHORTNESS OF BREATH): ICD-10-CM

## 2022-08-01 DIAGNOSIS — R49.0 DYSPHONIA: Primary | ICD-10-CM

## 2022-08-01 PROCEDURE — 92507 TX SP LANG VOICE COMM INDIV: CPT | Mod: GN | Performed by: SPEECH-LANGUAGE PATHOLOGIST

## 2022-08-01 NOTE — LETTER
"8/1/2022       RE: Hailey Stark  [address]  [facility] [address]     Dear Colleague,    Thank you for referring your patient, Hailey Stark, to the [facility] VOICE CLINIC [place] [facility]. Please see a copy of my visit note below.    Hailey Stark is a 52 year old female who is being cared for via a billable virtual visit.        The patient has been notified and verbally consented to the following statements:     This video visit will be conducted between you and your provider.    Patient has opted to conduct today's video visit vs an in-person appointment, and is not able to attend due to possible exposure to COVID-19.      If during the course of the call the provider feels a video visit is not appropriate, you will not be charged for this service.    Provider has received verbal consent for billable virtual visit from the patient? Yes    Preferred method for receiving information: Monitorhart     Call initiated at: 2:55 PM  Platform used to conduct today's virtual appointment: AM Rober Video  Location of provider: Residence  Location of patient: [facility] VOICE [facility]  THERAPY NOTE (CPT 10741)  Patient: Hailey Stark  Date of Service: 8/1/2022  Referring physician: Dr. 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    Remarkable findings included:  ? Perceptual evaluation demonstrated:   ? Roughness: Minimal  ? Breathiness: Minimal  ? Strain: WNL   ? Sounds short of breath and speaks with shorter phrases    Laryngeal exam demonstrated: mild to moderate supraglottic hyperfunction " "during connected speech tasks.     Primary complaint of patient today included: breathing difficulties  Therefore, we recommended a course of speech therapy to address these concerns.\"    SUBJECTIVE:  Since the last appointment, Ms. Stark reports the following:     Overall she reports that symptoms are improving    The A/C breaks a lot and the humidity can be difficult while wearing a mask.     OBJECTIVE:  Ms. Stark presents today with the following:  BREATHING:   ? inspirations are inadequate in volume and frequency  ? clavicular elevation on inspiration  ? phonation is not consistently coordinated with respiration    PATIENT REPORTED MEASURES:  Patient Supplied Answers To SLP QOL Questionnaire  No flowsheet data found.  Speech follow up as discussed with patient:  Dysponia SLP Goals 7/8/2022 8/1/2022 8/15/2022   How would you rate your speaking voice quality, if 0 is worst voice quality, and 10 is best voice? - 10 -   How would you rate your breathing, if 0 is the worst and 10 is the best? - 7 7   How much does your swallowing problem bother you?      - Not at all -   How much does your cough/throat-clearing problem bother you?            - Not at all -   How much does your breathing problem bother you?         Somewhat Somewhat A little bit   How much does your throat discomfort bother you?     - A little bit Not at all         THERAPEUTIC ACTIVITIES    Demonstrated previous exercises.  o demonstrated improved technique  o appropriate redirection provided  o instruction provided for increased level of complexity/difficulty    Exercises to promote optimal respiratory mechanics    she demonstrated clavicular/neck/shoulder involvement in inhalation    With clinician support, patient was able to demonstrate improved abdominal relaxation and engagement on inhalation    Progressed to walking in/ down her sidewalk/ back to the house in less than 30 seconds.     [place] a respiratory pacing exercise; this was " helpful    Rescue breathing strategies using oral configurations to promote improved vocal fold abduction were instructed    Patient reported rounded lips was most beneficial, also practiced with and without a mask    Patient was able to demonstrate use of these techniques in combination with low respiratory engagement mild clinician support    acceptable improvement in airflow and respiratory mechanics    Exercises in techniques for improved airflow during phonation    Progressed to easy onset/ flow, and blending phrases    Vincent techniques to reduce glottal pastor and improve breath flow; negative practice improved awareness today.  Flow phonation was targeted from the syllable to short phase level, utilizing front vowels and connected   Counseling and Education:    Asked many questions about the nature of her symptoms, and I answered all of these thoroughly.    A revised regimen for home practice was instructed.    I provided and AVS of today's therapeutic activities to facilitate practice.      ASSESSMENT/PLAN  PROGRESS TOWARD LONG TERM GOALS:   Adequate progress; please see above    IMPRESSIONS: Shortness Of Breath (R06.02), Laryngeal Hyperfunction (J38.7), Dysphonia (R49.0) and imbalance in respiratory mechanics in the context of COVID-19 in November 2021.  Ms. Stark demonstrated good learning of therapeutic activities to help improve breath flow and phonation today.     PLAN: I will see Ms. Stark in November weeks, at which point we will continue therapy.   For practice goals see AVS.     TOTAL SERVICE TIME:   Call Initiated at: 2:55 PM  Call Ended at: 4pm           CPT Billing Codes:   TREATMENT (99883)  NO CHARGE FACILITY FEE (03968)    Juliette Talley M.M. (voice) MYISEL., CCC/SLP  Speech-Language Pathologist  [facility] Trained Vocologist  [facility]  [phone]  [email or web address]  Pronouns: she/her      *this report was created in part through the use of computerized dictation software,  and though reviewed following completion, some typographic errors may persist.  If there is confusion regarding any of this notes contents, please contact me for clarification        Again, thank you for allowing me to participate in the care of your patient.      Sincerely,    Juilette Talley, SLP

## 2022-08-01 NOTE — PROGRESS NOTES
"Hailey Stark is a 52 year old female who is being cared for via a billable virtual visit.        The patient has been notified and verbally consented to the following statements:     This video visit will be conducted between you and your provider.    Patient has opted to conduct today's video visit vs an in-person appointment, and is not able to attend due to possible exposure to COVID-19.      If during the course of the call the provider feels a video visit is not appropriate, you will not be charged for this service.    Provider has received verbal consent for billable virtual visit from the patient? Yes    Preferred method for receiving information: [employer]     Call initiated at: 2:55 PM  Platform used to conduct today's virtual appointment: AM Well Video  Location of provider: Residence  Location of patient: [facility] VOICE CLINIC  THERAPY NOTE (CPT 27179)  Patient: Hailey Stark  Date of Service: 8/1/2022  Referring physician: Dr. 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    Remarkable findings included:  ? Perceptual evaluation demonstrated:   ? Roughness: Minimal  ? Breathiness: Minimal  ? Strain: WNL   ? Sounds short of breath and speaks with shorter phrases    Laryngeal exam demonstrated: mild to moderate supraglottic hyperfunction during connected speech tasks.     Primary complaint of patient today included: breathing difficulties  Therefore, we recommended a course of speech therapy to address these concerns.\"    SUBJECTIVE:  Since the last appointment, Ms. Stark reports the following:     Overall she reports that symptoms are " improving    The A/C breaks a lot and the humidity can be difficult while wearing a mask.     OBJECTIVE:  Ms. Stark presents today with the following:  BREATHING:   ? inspirations are inadequate in volume and frequency  ? clavicular elevation on inspiration  ? phonation is not consistently coordinated with respiration    PATIENT REPORTED MEASURES:  Patient Supplied Answers To SLP QOL Questionnaire  No flowsheet data found.  Speech follow up as discussed with patient:  Dysponia SLP Goals 7/8/2022 8/1/2022 8/15/2022   How would you rate your speaking voice quality, if 0 is worst voice quality, and 10 is best voice? - 10 -   How would you rate your breathing, if 0 is the worst and 10 is the best? - 7 7   How much does your swallowing problem bother you?      - Not at all -   How much does your cough/throat-clearing problem bother you?            - Not at all -   How much does your breathing problem bother you?         Somewhat Somewhat A little bit   How much does your throat discomfort bother you?     - A little bit Not at all         THERAPEUTIC ACTIVITIES    Demonstrated previous exercises.  o demonstrated improved technique  o appropriate redirection provided  o instruction provided for increased level of complexity/difficulty    Exercises to promote optimal respiratory mechanics    she demonstrated clavicular/neck/shoulder involvement in inhalation    With clinician support, patient was able to demonstrate improved abdominal relaxation and engagement on inhalation    Progressed to walking in/ down her sidewalk/ back to the house in less than 30 seconds.     [place] a respiratory pacing exercise; this was helpful    Rescue breathing strategies using oral configurations to promote improved vocal fold abduction were instructed    Patient reported rounded lips was most beneficial, also practiced with and without a mask    Patient was able to demonstrate use of these techniques in combination with low respiratory  engagement mild clinician support    acceptable improvement in airflow and respiratory mechanics    Exercises in techniques for improved airflow during phonation    Progressed to easy onset/ flow, and blending phrases    [place] techniques to reduce glottal pastor and improve breath flow; negative practice improved awareness today.  Flow phonation was targeted from the syllable to short phase level, utilizing front vowels and connected   Counseling and Education:    Asked many questions about the nature of her symptoms, and I answered all of these thoroughly.    A revised regimen for home practice was instructed.    I provided and AVS of today's therapeutic activities to facilitate practice.      ASSESSMENT/PLAN  PROGRESS TOWARD LONG TERM GOALS:   Adequate progress; please see above    IMPRESSIONS: Shortness Of Breath (R06.02), Laryngeal Hyperfunction (J38.7), Dysphonia (R49.0) and imbalance in respiratory mechanics in the context of COVID-19 in November 2021.  Ms. Stark demonstrated good learning of therapeutic activities to help improve breath flow and phonation today.     PLAN: I will see Ms. Stark in November weeks, at which point we will continue therapy.   For practice goals see AVS.     TOTAL SERVICE TIME:   Call Initiated at: 2:55 PM  Call Ended at: 4pm           CPT Billing Codes:   TREATMENT (32884)  NO CHARGE FACILITY FEE (47280)    Juilette Talley M.M. (voice) MEnmaA., CCC/SLP  Speech-Language Pathologist  [facility] Trained Vocologist  [facility] Voice [facility]  [phone]  [email or web address]  Pronouns: she/her      *this report was created in part through the use of computerized dictation software, and though reviewed following completion, some typographic errors may persist.  If there is confusion regarding any of this notes contents, please contact me for clarification

## 2022-08-01 NOTE — PATIENT INSTRUCTIONS
"After Visit Summary    Patient: Hailey Stark  Date of Visit: 8/1/2022    These notes are also available in your MyChart. Please take a few moments to find them under \"Past Appointments\" in the [facility] system, as Juliette will start to phase out e-mail communications.    Order of today's appointment:  Phrases for Blending   fall over   go into   put upon   leave on   the only   lose it   see it   the other   win it   do it    not even   that's enough   put on   not any   leave open   down under  cold as   one of   not old   the ice   she's ill   look at   he's ill   then add   the end   yes and   so it   high up   one at   Dasia is    Fall_over_the_chair                     Go_(w)into the store  Leave_on_the_lights                    The(e)_(y)only one  See_it_over_there                        The(e)_(y)other day  Do_it_now     Not_even her mom  Put(d)_on_your_shoes         Not_any more  Down_under_the_stairs  Cold_as ice  Not_old_enough    the ice is cold  Look_at_the_sky    he's ill with the flu  The_end_of_the_story       yes_and no  High_up_on_the_shelf     one at a time  Phrases for Easy Onset and Blending (from Adriana)    Natan ate an apple and allowed Luis another.    Each and every avenue is open at eight o'clock.    Over on [address] is an open air amphitheater.    I am in agreement in every aspect of our association.    Matthew's attitude is overly obnoxious.    In April, Erica always [facility].    Brenda openly acknowledges an aversion to avocados.    Exercise is an important and energizing activity.    Sharifa is in [place] at an annual event.    Eliminating additives is advisable.    Is Dolores afraid of an eerie effigy?    Simon is outdoors on an icy evening.    Actually, I am aware of all errors in Adrian's arithmetic assignment.    It is eleven o'clock already, and all of us are anxiously awaiting Sandy's arrival.    Every evening in autumn our area orchestra attempts to entertain an " "uninterested audience of adolescents.    Breathing:   In the morning and evening (twice daily) for 2-5 minutes:   Breathe while lying on your back with your face and knees up. Hands on tummy and chest.  Take a breath in with rounded lips and exhale with a  shhh    Inhale  = Inflate; exhale = deflate  3x each: try breathin in/8 out, 5/10, 3/4  Throughout the day (2-3x/day for just a couple minutes) check breathing while keeping shoulders relaxed (riding to and from school, etc.)  Pulsed breathing Exercise (2-3x/day):   3x each  Sh, Sh, Sh...  S, S, S...  F, F, F.....  Now, all three combined Sh, Sh, Sh, S, S, S, F, F, F (tummy IN for each sound)  Shh exercise (loudness) : 1) one on one loudness 2) 2-3 people 3) 5-6 people loudness.  Feel breath in the abdominal area and not in the throat.   Breath Pacing:   Coordinate sipping breath with counting:  Breathe in through rounded lips with tongue behind lower teeth or touching at the bump behind upper teeth  Sip breath \"1\"  Sip breath \"1-2\"  Sip breath \"1-2-3\"  Sip breath \"1-2-3-4\"... up to 5      Rescue Breathing Techniques:  1. Breathing in through rounded lips and out with a \"sh\"  2. Breathing in through the nose and out with \"sh\"  3. Repeated sniffs/inhales through rounded lips and out with a \"sh\"     Breathing Tips:  Tongue behind the lower teeth  Tongue up when exposed to cold air  Keep shoulders down and chest relaxed  Don t overextend your neck  Lead from your chest  Keeping head upright   Feel the weight in your elbows  Keep breathing when you stop an activity  Finding recovery pose  Hands behind the back  Hands on the knees standing  Elbows on the knees seated  Match your breathing rate with the rate of step   Inhale/ hold 1-2 seconds/ exhale  Breathe through turns       Juliette Talley M.M. (voice), M.A., CCC/SLP  Speech-Language Pathologist  [facility] Certified Vocologist  [facility]  [email or web address]  she/her    "

## 2022-08-15 ENCOUNTER — VIRTUAL VISIT (OUTPATIENT)
Dept: OTOLARYNGOLOGY | Facility: CLINIC | Age: 52
End: 2022-08-15
Payer: OTHER MISCELLANEOUS

## 2022-08-15 DIAGNOSIS — J38.7 LARYNGEAL HYPERFUNCTION: ICD-10-CM

## 2022-08-15 DIAGNOSIS — R49.0 DYSPHONIA: Primary | ICD-10-CM

## 2022-08-15 DIAGNOSIS — R06.02 SOB (SHORTNESS OF BREATH): ICD-10-CM

## 2022-08-15 PROCEDURE — 92507 TX SP LANG VOICE COMM INDIV: CPT | Mod: GN | Performed by: SPEECH-LANGUAGE PATHOLOGIST

## 2022-08-15 NOTE — LETTER
"8/15/2022       RE: Hailey Stark  [address]  [place] [address]     Dear Colleague,    Thank you for referring your patient, Hailey Stark, to the [facility] VOICE CLINIC [place] [facility]. Please see a copy of my visit note below.    Hailey Stark is a 52 year old female who is being cared for via a billable virtual visit.        The patient has been notified and verbally consented to the following statements:     This video visit will be conducted between you and your provider.    Patient has opted to conduct today's video visit vs an in-person appointment, and is not able to attend due to possible exposure to COVID-19.      If during the course of the call the provider feels a video visit is not appropriate, you will not be charged for this service.    Provider has received verbal consent for billable virtual visit from the patient? Yes    Preferred method for receiving information: [employer]     Call initiated at: 3:03 PM  Platform used to conduct today's virtual appointment: [employer] Video  Location of provider: Residence  Location of patient: [facility] VOICE [facility]  THERAPY NOTE (CPT 02367)  Patient: Hailey Stark  Date of Service: 8/15/2022  Referring physician: Dr. 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    Remarkable findings included:  ? Perceptual evaluation demonstrated:   ? Roughness: Minimal  ? Breathiness: Minimal  ? Strain: WNL   ? Sounds short of breath and speaks with shorter phrases    Laryngeal exam demonstrated: mild to moderate supraglottic " "hyperfunction during connected speech tasks.     Primary complaint of patient today included: breathing difficulties  Therefore, we recommended a course of speech therapy to address these concerns.\"    SUBJECTIVE:  Since the last appointment, Ms. Stark reports the following:     Overall she reports that symptoms are improving    OBJECTIVE:  Ms. Stark presents today with the following:  BREATHING:   ? inspirations are inadequate in volume and frequency  ? clavicular elevation on inspiration  ? phonation is not consistently coordinated with respiration    PATIENT REPORTED MEASURES:  Patient Supplied Answers To SLP QOL Questionnaire  No flowsheet data found.  Speech follow up as discussed with patient:  Dysponia SLP Goals 7/8/2022 8/1/2022 8/15/2022   How would you rate your speaking voice quality, if 0 is worst voice quality, and 10 is best voice? - 10 -   How would you rate your breathing, if 0 is the worst and 10 is the best? - 7 7   How much does your swallowing problem bother you?      - Not at all -   How much does your cough/throat-clearing problem bother you?            - Not at all -   How much does your breathing problem bother you?         Somewhat Somewhat A little bit   How much does your throat discomfort bother you?     - A little bit Not at all     Patient Supplied Answers to Dyspnea Index Questionnaire:  Dyspnea Index 8/15/2022   1. I have trouble getting air in. 1   2. I feel tightness in my throat when I am having gregory breathing problem. 0   3. It takes more effort to breathe than it used to. 1   4. Change in weather affect my breathing problem. 1   5. My breathing gets worse with stress. 0   6. I make sound/noise breathing in 0   7. I have to strain to breathe. 1   8. My shortness of breath gets worse with exercise or physical activity 2   9. My breathing problem makes me feel stressed. 2   10. My breathing problem casuses me to restrict my personal and social life. 0   Dyspnea Index Total Score 8 "       THERAPEUTIC ACTIVITIES    Demonstrated previous exercises.  o demonstrated improved technique  o appropriate redirection provided  o instruction provided for increased level of complexity/difficulty    Exercises in techniques for improved airflow during phonation    Speech material with /ju/ glides and aspirate onsets was facilitating at the word level.    Progressed to easy onset/ flow, and blending phrases    Instructed with a descending 5th, as well as an arpeggio pattern; this was helpful.    [place] techniques to reduce glottal pastor and improve breath flow; negative practice improved awareness today.    Exercises to promote optimal respiratory mechanics    Pursed lip and/or nasal inspiration was targeted during automatic speech tasks and sentences of gradually increasing length. The patient independently implemented this with 60% accuracy and made self-corrections when stirdorous inspirations was noted. Pt reported feeling respiratory mechanics in the upper abdominals. Clearer voice quality with reduced roughness was detected with automatic speech tasks (e.g. counting 1-5 or 1-10) immediately following a pursed lip inhalation. Roughness did increase at the ends of these phrases but improved with increased awareness of breath control and increased frequently of breaths throughout a phrase.    Practiced in a seated position, with tactile cue of a hand on the low rib-cage to facilitate awareness of low respiratory engagement.      With clinician support, patient was able to demonstrate improved abdominal relaxation and engagement on inhalation    acceptable improvement in airflow and respiratory mechanics    Counseling and Education:    Asked many questions about the nature of her symptoms, and I answered all of these thoroughly.    A revised regimen for home practice was instructed.    I provided an AVS of today's therapeutic activities to facilitate practice.    ASSESSMENT/PLAN  PROGRESS TOWARD LONG TERM  GOALS:   Adequate progress; too early for objective measures    IMPRESSIONS: Shortness Of Breath (R06.02), Laryngeal Hyperfunction (J38.7), Dysphonia (R49.0) and imbalance in respiratory mechanics in the context of COVID-19 in November 2021.  Ms. Stark demonstrated good learning of therapeutic activities to improve respiratory mechanics and coordination with phonation.    PLAN: I will see Ms. Stark in November to continue therapy.   For practice goals see AVS.     TOTAL SERVICE TIME:   Call Initiated at: 3:03 PM  Call Ended at: 4 pm           CPT Billing Codes:   TREATMENT (94473)  NO CHARGE FACILITY FEE (33094)    Juliette Talley M.M. (voice), M.A., CCC/SLP  Speech-Language Pathologist  [facility] Trained Vocologist  [facility]  [phone]  [email or web address]  Pronouns: she/her      *this report was created in part through the use of computerized dictation software, and though reviewed following completion, some typographic errors may persist.  If there is confusion regarding any of this notes contents, please contact me for clarification              Again, thank you for allowing me to participate in the care of your patient.      Sincerely,    Juliette Talley, SLP

## 2022-08-15 NOTE — PROGRESS NOTES
"Hailey Stark is a 52 year old female who is being cared for via a billable virtual visit.        The patient has been notified and verbally consented to the following statements:     This video visit will be conducted between you and your provider.    Patient has opted to conduct today's video visit vs an in-person appointment, and is not able to attend due to possible exposure to COVID-19.      If during the course of the call the provider feels a video visit is not appropriate, you will not be charged for this service.    Provider has received verbal consent for billable virtual visit from the patient? Yes    Preferred method for receiving information: [employer]     Call initiated at: 3:03 PM  Platform used to conduct today's virtual appointment: AM Well Video  Location of provider: Residence  Location of patient: [facility] VOICE CLINIC  THERAPY NOTE (CPT 86647)  Patient: Hailey Stark  Date of Service: 8/15/2022  Referring physician: Dr. 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    Remarkable findings included:  ? Perceptual evaluation demonstrated:   ? Roughness: Minimal  ? Breathiness: Minimal  ? Strain: WNL   ? Sounds short of breath and speaks with shorter phrases    Laryngeal exam demonstrated: mild to moderate supraglottic hyperfunction during connected speech tasks.     Primary complaint of patient today included: breathing difficulties  Therefore, we recommended a course of speech therapy to address these concerns.\"    SUBJECTIVE:  Since the last appointment, Ms. Stark reports the following:     Overall she reports that symptoms are " improving    OBJECTIVE:  Ms. Stark presents today with the following:  BREATHING:   ? inspirations are inadequate in volume and frequency  ? clavicular elevation on inspiration  ? phonation is not consistently coordinated with respiration    PATIENT REPORTED MEASURES:  Patient Supplied Answers To SLP QOL Questionnaire  No flowsheet data found.  Speech follow up as discussed with patient:  Dysponia SLP Goals 7/8/2022 8/1/2022 8/15/2022   How would you rate your speaking voice quality, if 0 is worst voice quality, and 10 is best voice? - 10 -   How would you rate your breathing, if 0 is the worst and 10 is the best? - 7 7   How much does your swallowing problem bother you?      - Not at all -   How much does your cough/throat-clearing problem bother you?            - Not at all -   How much does your breathing problem bother you?         Somewhat Somewhat A little bit   How much does your throat discomfort bother you?     - A little bit Not at all     Patient Supplied Answers to Dyspnea Index Questionnaire:  Dyspnea Index 8/15/2022   1. I have trouble getting air in. 1   2. I feel tightness in my throat when I am having gregory breathing problem. 0   3. It takes more effort to breathe than it used to. 1   4. Change in weather affect my breathing problem. 1   5. My breathing gets worse with stress. 0   6. I make sound/noise breathing in 0   7. I have to strain to breathe. 1   8. My shortness of breath gets worse with exercise or physical activity 2   9. My breathing problem makes me feel stressed. 2   10. My breathing problem casuses me to restrict my personal and social life. 0   Dyspnea Index Total Score 8       THERAPEUTIC ACTIVITIES    Demonstrated previous exercises.  o demonstrated improved technique  o appropriate redirection provided  o instruction provided for increased level of complexity/difficulty    Exercises in techniques for improved airflow during phonation    Speech material with /ju/ glides and aspirate  onsets was facilitating at the word level.    Progressed to easy onset/ flow, and blending phrases    Instructed with a descending 5th, as well as an arpeggio pattern; this was helpful.    [place] techniques to reduce glottal pastor and improve breath flow; negative practice improved awareness today.    Exercises to promote optimal respiratory mechanics    Pursed lip and/or nasal inspiration was targeted during automatic speech tasks and sentences of gradually increasing length. The patient independently implemented this with 60% accuracy and made self-corrections when stirdorous inspirations was noted. Pt reported feeling respiratory mechanics in the upper abdominals. Clearer voice quality with reduced roughness was detected with automatic speech tasks (e.g. counting 1-5 or 1-10) immediately following a pursed lip inhalation. Roughness did increase at the ends of these phrases but improved with increased awareness of breath control and increased frequently of breaths throughout a phrase.    Practiced in a seated position, with tactile cue of a hand on the low rib-cage to facilitate awareness of low respiratory engagement.      With clinician support, patient was able to demonstrate improved abdominal relaxation and engagement on inhalation    acceptable improvement in airflow and respiratory mechanics    Counseling and Education:    Asked many questions about the nature of her symptoms, and I answered all of these thoroughly.    A revised regimen for home practice was instructed.    I provided an AVS of today's therapeutic activities to facilitate practice.    ASSESSMENT/PLAN  PROGRESS TOWARD LONG TERM GOALS:   Adequate progress; too early for objective measures    IMPRESSIONS: Shortness Of Breath (R06.02), Laryngeal Hyperfunction (J38.7), Dysphonia (R49.0) and imbalance in respiratory mechanics in the context of COVID-19 in November 2021.  Ms. Stark demonstrated good learning of therapeutic activities to improve  respiratory mechanics and coordination with phonation.    PLAN: I will see Ms. Stark in November to continue therapy.   For practice goals see AVS.     TOTAL SERVICE TIME:   Call Initiated at: 3:03 PM  Call Ended at: 4 pm           CPT Billing Codes:   TREATMENT (11009)  NO CHARGE FACILITY FEE (60951)    Juliette Talley M.M. (voice), M.A., CCC/SLP  Speech-Language Pathologist  [facility] Trained Vocologist  [facility]  [phone]  [email or web address]  Pronouns: she/her      *this report was created in part through the use of computerized dictation software, and though reviewed following completion, some typographic errors may persist.  If there is confusion regarding any of this notes contents, please contact me for clarification

## 2022-08-25 NOTE — TELEPHONE ENCOUNTER
RECORDS RECEIVED FROM: Post-Covid chronic palpitations, referred by Dr. Genao   DATE RECEIVED: 12/5/2022   NOTES (FOR ALL VISITS) STATUS DETAILS   OFFICE NOTES from referring provider [facility]:  7/13/22 - PMR OV with Dr. Genao   OFFICE NOTES from other specialist [facility]:  (MAIKEL) 11/9/22, 8/15/22 - SPEECH OV with Juliette Talley, SLP  4/19/22 - ENT OV with Dr. Wheeler  2/22/22 - PULM OV with Dr. Encinas   ED NOTES N/A    OPERATIVE REPORT  (thyroid, pituitary, adrenal, parathyroid) N/A    MEDICATION LIST Internal    IMAGING      XR (Chest) [facility]:  4/12/22 - XR Chest  4/29/21 - XR Chest  11/19/20 - XR Chest   CT (HEAD/NECK/CHEST/ABDOMEN) [facility]:  1/21/22 - CT Chest   LABS     DIABETES: HBGA1C, CREATININE, FASTING LIPIDS, MICROALBUMIN URINE, POTASSIUM, TSH, T4    THYROID: TSH, T4, CBC, THYRODLONULIN, TOTAL T3, FREE T4, CALCITONIN, CEA [facility]:  4/12/22 - CMP  4/12/22 - TSH, T4  1/31/22 - CBC  1/31/22 - Glucose  1/31/22 - HBGA1C  1/31/22 - Lipid

## 2022-08-31 ENCOUNTER — OFFICE VISIT (OUTPATIENT)
Dept: CARDIOLOGY | Facility: CLINIC | Age: 52
End: 2022-08-31
Attending: INTERNAL MEDICINE
Payer: OTHER MISCELLANEOUS

## 2022-08-31 VITALS
OXYGEN SATURATION: 99 % | WEIGHT: 188.1 LBS | HEIGHT: 70 IN | DIASTOLIC BLOOD PRESSURE: 84 MMHG | HEART RATE: 67 BPM | SYSTOLIC BLOOD PRESSURE: 127 MMHG | BODY MASS INDEX: 26.93 KG/M2

## 2022-08-31 DIAGNOSIS — R00.2 PALPITATIONS: Primary | ICD-10-CM

## 2022-08-31 PROCEDURE — 93005 ELECTROCARDIOGRAM TRACING: CPT

## 2022-08-31 PROCEDURE — G0463 HOSPITAL OUTPT CLINIC VISIT: HCPCS | Mod: 25

## 2022-08-31 PROCEDURE — 99215 OFFICE O/P EST HI 40 MIN: CPT | Performed by: INTERNAL MEDICINE

## 2022-08-31 RX ORDER — DILTIAZEM HYDROCHLORIDE 120 MG/1
120 CAPSULE, COATED, EXTENDED RELEASE ORAL DAILY
Qty: 90 CAPSULE | Refills: 3 | Status: SHIPPED | OUTPATIENT
Start: 2022-08-31 | End: 2023-11-28

## 2022-08-31 ASSESSMENT — PAIN SCALES - GENERAL: PAINLEVEL: MODERATE PAIN (5)

## 2022-08-31 NOTE — PATIENT INSTRUCTIONS
Plan:   Stop propranolol  Start diltiazem 120mg daily  Follow-up as needed      Your Care Team:  EP Cardiology   Telephone Number     Chloé Epstein RN [phone]    After business hours: [phone], ask for cardiologist on-call   Non-procedure scheduling:    Rosa GOTTLIEB   [phone]   Procedure scheduling:    Sarai Ashley   [phone]   Device Clinic (Pacemakers, ICDs, Loop Recorders)    During business hours: [phone]  After business hours:   [phone]- select option 4 and ask for job code [id number].       Cardiovascular Clinic:   [address] [place] [address]. [place] [address]      As always, Thank you for trusting us with your health care needs!

## 2022-08-31 NOTE — NURSING NOTE
Chief Complaint   Patient presents with     Follow Up     2nd opinion per patient re tachycardia, palpitations, chest pressure, sob sp Covid infection 11/2020. Review zios in chart. Has seen Dr Fried and Dr Yung.       Vitals were taken, medications reconciled, and EKG performed.    Shyam Guadalupe  2:40 PM

## 2022-08-31 NOTE — PROGRESS NOTES
ELECTROPHYSIOLOGY CLINIC VISIT    Assessment/Recommendations   Assessment/Plan:    Ms. Stark is a 52 year old female who has a past medical history significant for pectus excavatum, AVNRT s/p ablation 2000, PUD, renal stones, COVID infection 2020, and cervical dysplasia.     She was having palpitations and elevated HR.  Zio patch monitors showed no sustained arrhythmias and poor symptom/rhythm correlation.  She had a history of SVT and AVNRT ablation in 2000 which procedure note indicated it was a successful procedure.  Seems that she is likely having some autonomic dysfunction after COVID is the most likely cause of her ongoing symptoms.  We discussed that this can take a while to resolve fully.  Given propanolol is causing fatigue but did help with her elevated HRs, we would switch her to diltiazem 120 mg daily.  We also discussed that reconditioning is important in treatment for this.  We encouraged her to stay well-hydrated and liberalize electrolyte/salt intake.    Follow up on an as-needed basis.       History of Present Illness/Subjective    Ms. Hailey Stark is a 52 year old female who comes in today for EP consultation of palpitations.    Ms. Stark is a 52 year old female who has a past medical history significant for pectus excavatum, AVNRT s/p ablation 2000, PUD, renal stones, COVID infection 2020, and cervical dysplasia.     She had a known history of SVT for which she had a AVNRT ablation in 2000. She had COVID infection in 2020 and she reports her symptoms started after that.  She felt recurrent palpitations/tachycardia. A zio patch from 8/2021 showed no sustained phil/tachyarrhyhmias. She reports having HRs up to 120-140s at home. One time she went to the ER and by the time she arrived to ER pulse was back to 70s and was asymptomatic. A Holter from 8/2021 showed some episodes of junctional rhythm and symptoms showed SR or sometimes junctional. An echo from 9/2021 showed LVEF 55-60%, normal RV  "size/function, and no significant valvular abnormalities. A repeat zio patch monitor in 5/2022 showed short AT runs and repeat Echo was normal. Her symptoms were felt not to be cardiac in natures.     She presents today for a second opinion. She reports feeling at baseline. Since she has been on propranolol she does not feel the higher HR anymore. She takes it only once in the morning, still gives her fatigue and is not taking it 3 times a day like prescribed. She denies chest discomfort, palpitations, abdominal fullness/bloating or peripheral edema, shortness of breath, paroxysmal nocturnal dyspnea, orthopnea, lightheadedness, dizziness, pre-syncope, or syncope. Presenting 12 lead ECG shows NSR Vent Rate 67 bpm,  ms, QRS 84 ms, QTc 418 ms. Current cardiac medications include: propanolol.    I have reviewed and updated the patient's Past Medical History, Social History, Family History and Medication List.     Cardiographics (Personally Reviewed) :   6/13/22 Echo:   Interpretation Summary  Technically difficult study.Extremely poor acoustic windows.  Global and regional left ventricular function is normal with an EF of 60-65%.  Right ventricular function, chamber size, wall motion, and thickness are normal.  Pulmonary artery systolic pressure cannot be assessed.  The inferior vena cava is normal.  No pericardial effusion is present.       Physical Examination   /84 (BP Location: Right arm, Patient Position: Chair, Cuff Size: Adult Regular)   Pulse 67   Ht 1.768 m (5' 9.61\")   Wt 85.3 kg (188 lb 1.6 oz)   SpO2 99%   BMI 27.30 kg/m    Wt Readings from Last 3 Encounters:   06/13/22 84.7 kg (186 lb 12.8 oz)   06/07/22 83.9 kg (185 lb)   04/19/22 84.4 kg (186 lb)   /84 (BP Location: Right arm, Patient Position: Chair, Cuff Size: Adult Regular)   Pulse 67   Ht 1.768 m (5' 9.61\")   Wt 85.3 kg (188 lb 1.6 oz)   SpO2 99%   BMI 27.30 kg/m      General Appearance:   Alert, well-appearing and in no " acute distress.   HEENT: Atraumatic, normocephalic. PERRL.  MMM.   Chest/Lungs:   Respirations unlabored.  Lungs are clear to auscultation.   Cardiovascular:   Regular rate and rhythm.  S1/S2. No murmur.    Abdomen:  Soft, nontender, nondistended.   Extremities: No cyanosis or clubbing. No edema.    Musculoskeletal: Moves all extremities.  Gait normal.   Skin: Warm, dry, intact.    Neurologic: Mood and affect are appropriate.  Alert and oriented to person, place, time, and situation.          Medications  Allergies   Current Outpatient Medications   Medication Sig Dispense Refill     albuterol (PROAIR HFA/PROVENTIL HFA/VENTOLIN HFA) 108 (90 Base) MCG/ACT inhaler Inhale 2 puffs into the lungs every 6 hours 18 g 1     buPROPion (WELLBUTRIN XL) 150 MG 24 hr tablet Take 1 tablet (150 mg) by mouth every morning 90 tablet 3     fluticasone-salmeterol (ADVAIR) 250-50 MCG/DOSE inhaler Inhale 1 puff into the lungs every 12 hours 2 each 1     ibuprofen (ADVIL,MOTRIN) 800 MG tablet Take 1 tablet (800 mg) by mouth every 8 hours as needed for moderate pain 30 tablet 0     levothyroxine (SYNTHROID/LEVOTHROID) 50 MCG tablet Take 1 tablet (50 mcg) by mouth daily 90 tablet 3     PARoxetine (PAXIL) 20 MG tablet Take 1 tablet (20 mg) by mouth every morning 90 tablet 3     Pilocarpine HCl 1.25 % SOLN Apply 1 drop to eye every morning 2.5 mL 0     propranolol (INDERAL) 20 MG tablet Take 1 tablet (20 mg) by mouth 3 times daily 270 tablet 3    Allergies   Allergen Reactions     Meloxicam      Tongue swelling         Lab Results (Personally Reviewed)    Chemistry/lipid CBC Cardiac Enzymes/BNP/TSH/INR   Lab Results   Component Value Date    BUN 10 04/12/2022     04/12/2022    CO2 26 04/12/2022     Creatinine   Date Value Ref Range Status   04/12/2022 0.96 0.52 - 1.04 mg/dL Final   03/19/2021 1.03 0.52 - 1.04 mg/dL Final       Lab Results   Component Value Date    CHOL 233 (H) 01/31/2022    HDL 59 01/31/2022     (H) 01/31/2022       Lab Results   Component Value Date    WBC 6.5 04/12/2022    HGB 11.9 04/12/2022    HCT 36.4 04/12/2022    MCV 86 04/12/2022     04/12/2022    Lab Results   Component Value Date    TSH 1.82 04/12/2022        The patient states understanding and is agreeable with the plan.   Jaylen Wade MD [facility]  Cardiology - Electrophysiology    Total time spent on patient visit, reviewing notes, imaging, labs, orders, and completing necessary documentation: 45 minutes.

## 2022-08-31 NOTE — LETTER
8/31/2022      RE: Hailey Stark  [address]  [facility] [address]       Dear Colleague,    Thank you for the opportunity to participate in the care of your patient, Hailey Stark, at the [facility] [place] [facility]. Please see a copy of my visit note below.        ELECTROPHYSIOLOGY CLINIC VISIT    Assessment/Recommendations   Assessment/Plan:    Ms. Stark is a 52 year old female who has a past medical history significant for pectus excavatum, AVNRT s/p ablation 2000, PUD, renal stones, COVID infection 2020, and cervical dysplasia.     She was having palpitations and elevated HR.  Zio patch monitors showed no sustained arrhythmias and poor symptom/rhythm correlation.  She had a history of SVT and AVNRT ablation in 2000 which procedure note indicated it was a successful procedure.  Seems that she is likely having some autonomic dysfunction after COVID is the most likely cause of her ongoing symptoms.  We discussed that this can take a while to resolve fully.  Given propanolol is causing fatigue but did help with her elevated HRs, we would switch her to diltiazem 120 mg daily.  We also discussed that reconditioning is important in treatment for this.  We encouraged her to stay well-hydrated and liberalize electrolyte/salt intake.    Follow up on an as-needed basis.       History of Present Illness/Subjective    Ms. Hailey Stark is a 52 year old female who comes in today for EP consultation of palpitations.    Ms. Stark is a 52 year old female who has a past medical history significant for pectus excavatum, AVNRT s/p ablation 2000, PUD, renal stones, COVID infection 2020, and cervical dysplasia.     She had a known history of SVT for which she had a AVNRT ablation in 2000. She had COVID infection in 2020 and she reports her symptoms started after that.  She felt recurrent palpitations/tachycardia. A zio patch from 8/2021 showed no  "sustained phil/tachyarrhyhmias. She reports having HRs up to 120-140s at home. One time she went to the ER and by the time she arrived to ER pulse was back to 70s and was asymptomatic. A Holter from 8/2021 showed some episodes of junctional rhythm and symptoms showed SR or sometimes junctional. An echo from 9/2021 showed LVEF 55-60%, normal RV size/function, and no significant valvular abnormalities. A repeat zio patch monitor in 5/2022 showed short AT runs and repeat Echo was normal. Her symptoms were felt not to be cardiac in natures.     She presents today for a second opinion. She reports feeling at baseline. Since she has been on propranolol she does not feel the higher HR anymore. She takes it only once in the morning, still gives her fatigue and is not taking it 3 times a day like prescribed. She denies chest discomfort, palpitations, abdominal fullness/bloating or peripheral edema, shortness of breath, paroxysmal nocturnal dyspnea, orthopnea, lightheadedness, dizziness, pre-syncope, or syncope. Presenting 12 lead ECG shows NSR Vent Rate 67 bpm,  ms, QRS 84 ms, QTc 418 ms. Current cardiac medications include: propanolol.    I have reviewed and updated the patient's Past Medical History, Social History, Family History and Medication List.     Cardiographics (Personally Reviewed) :   6/13/22 Echo:   Interpretation Summary  Technically difficult study.Extremely poor acoustic windows.  Global and regional left ventricular function is normal with an EF of 60-65%.  Right ventricular function, chamber size, wall motion, and thickness are normal.  Pulmonary artery systolic pressure cannot be assessed.  The inferior vena cava is normal.  No pericardial effusion is present.       Physical Examination   /84 (BP Location: Right arm, Patient Position: Chair, Cuff Size: Adult Regular)   Pulse 67   Ht 1.768 m (5' 9.61\")   Wt 85.3 kg (188 lb 1.6 oz)   SpO2 99%   BMI 27.30 kg/m    Wt Readings from Last 3 " "Encounters:   06/13/22 84.7 kg (186 lb 12.8 oz)   06/07/22 83.9 kg (185 lb)   04/19/22 84.4 kg (186 lb)   /84 (BP Location: Right arm, Patient Position: Chair, Cuff Size: Adult Regular)   Pulse 67   Ht 1.768 m (5' 9.61\")   Wt 85.3 kg (188 lb 1.6 oz)   SpO2 99%   BMI 27.30 kg/m      General Appearance:   Alert, well-appearing and in no acute distress.   HEENT: Atraumatic, normocephalic. PERRL.  MMM.   Chest/Lungs:   Respirations unlabored.  Lungs are clear to auscultation.   Cardiovascular:   Regular rate and rhythm.  S1/S2. No murmur.    Abdomen:  Soft, nontender, nondistended.   Extremities: No cyanosis or clubbing. No edema.    Musculoskeletal: Moves all extremities.  Gait normal.   Skin: Warm, dry, intact.    Neurologic: Mood and affect are appropriate.  Alert and oriented to person, place, time, and situation.          Medications  Allergies   Current Outpatient Medications   Medication Sig Dispense Refill     albuterol (PROAIR HFA/PROVENTIL HFA/VENTOLIN HFA) 108 (90 Base) MCG/ACT inhaler Inhale 2 puffs into the lungs every 6 hours 18 g 1     buPROPion (WELLBUTRIN XL) 150 MG 24 hr tablet Take 1 tablet (150 mg) by mouth every morning 90 tablet 3     fluticasone-salmeterol (ADVAIR) 250-50 MCG/DOSE inhaler Inhale 1 puff into the lungs every 12 hours 2 each 1     ibuprofen (ADVIL,MOTRIN) 800 MG tablet Take 1 tablet (800 mg) by mouth every 8 hours as needed for moderate pain 30 tablet 0     levothyroxine (SYNTHROID/LEVOTHROID) 50 MCG tablet Take 1 tablet (50 mcg) by mouth daily 90 tablet 3     PARoxetine (PAXIL) 20 MG tablet Take 1 tablet (20 mg) by mouth every morning 90 tablet 3     Pilocarpine HCl 1.25 % SOLN Apply 1 drop to eye every morning 2.5 mL 0     propranolol (INDERAL) 20 MG tablet Take 1 tablet (20 mg) by mouth 3 times daily 270 tablet 3    Allergies   Allergen Reactions     Meloxicam      Tongue swelling         Lab Results (Personally Reviewed)    Chemistry/lipid CBC Cardiac " Enzymes/BNP/TSH/INR   Lab Results   Component Value Date    BUN 10 04/12/2022     04/12/2022    CO2 26 04/12/2022     Creatinine   Date Value Ref Range Status   04/12/2022 0.96 0.52 - 1.04 mg/dL Final   03/19/2021 1.03 0.52 - 1.04 mg/dL Final       Lab Results   Component Value Date    CHOL 233 (H) 01/31/2022    HDL 59 01/31/2022     (H) 01/31/2022      Lab Results   Component Value Date    WBC 6.5 04/12/2022    HGB 11.9 04/12/2022    HCT 36.4 04/12/2022    MCV 86 04/12/2022     04/12/2022    Lab Results   Component Value Date    TSH 1.82 04/12/2022        The patient states understanding and is agreeable with the plan.   Jaylen Wade MD [facility]  Cardiology - Electrophysiology    Total time spent on patient visit, reviewing notes, imaging, labs, orders, and completing necessary documentation: 45 minutes.

## 2022-09-01 LAB
ATRIAL RATE - MUSE: 67 BPM
DIASTOLIC BLOOD PRESSURE - MUSE: NORMAL MMHG
INTERPRETATION ECG - MUSE: NORMAL
P AXIS - MUSE: 39 DEGREES
PR INTERVAL - MUSE: 144 MS
QRS DURATION - MUSE: 84 MS
QT - MUSE: 396 MS
QTC - MUSE: 418 MS
R AXIS - MUSE: -11 DEGREES
SYSTOLIC BLOOD PRESSURE - MUSE: NORMAL MMHG
T AXIS - MUSE: 10 DEGREES
VENTRICULAR RATE- MUSE: 67 BPM

## 2022-09-19 ENCOUNTER — MYC MEDICAL ADVICE (OUTPATIENT)
Dept: CARDIOLOGY | Facility: CLINIC | Age: 52
End: 2022-09-19

## 2022-09-19 DIAGNOSIS — R00.0 TACHYCARDIA: Primary | ICD-10-CM

## 2022-09-19 RX ORDER — DILTIAZEM HYDROCHLORIDE 30 MG/1
30 TABLET, FILM COATED ORAL PRN
Qty: 15 TABLET | Refills: 0 | Status: SHIPPED | OUTPATIENT
Start: 2022-09-19 | End: 2024-07-25 | Stop reason: DRUGHIGH

## 2022-09-19 NOTE — TELEPHONE ENCOUNTER
MyC message sent to patient.     Juliette Wells, APRN CNP  You 20 minutes ago (3:49 PM)     LV    Hello,   Sure, she can try Diltiazem 30 mg short acting PRN for chest pressure/palpitations and may repeat x1 in 60 minutes if symptoms persisting.   If this does not work would encourage her to follow-up with her PCP re: other non-cardiac causes for these symptoms as well so that we are comprehensive.   Thanks,   LVW

## 2022-10-01 DIAGNOSIS — R00.0 TACHYCARDIA: ICD-10-CM

## 2022-10-05 NOTE — TELEPHONE ENCOUNTER
diltiazem (CARDIZEM) 30 MG tablet      Last Written Prescription Date:  9/19/2022  Last Fill Quantity: 15 tab,   # refills: 0  Last Office Visit : 8/31/2022  Future Office visit:  None      Routing refill request to provider for review/approval because:  Refill needs review:  - written 9/19/2022 as take PRN Disp:15  R:0  - send refills same qty?  add more refills this time?  - same directions or PRN QD at onset of fast heart rate?

## 2022-10-13 DIAGNOSIS — R00.0 TACHYCARDIA: ICD-10-CM

## 2022-10-17 RX ORDER — DILTIAZEM HYDROCHLORIDE 30 MG/1
TABLET, FILM COATED ORAL
Qty: 15 TABLET | Refills: 0
Start: 2022-10-17

## 2022-10-21 RX ORDER — DILTIAZEM HYDROCHLORIDE 30 MG/1
30 TABLET, FILM COATED ORAL PRN
Qty: 15 TABLET | OUTPATIENT
Start: 2022-10-21

## 2022-10-21 NOTE — TELEPHONE ENCOUNTER
Left voicemail for patient to call back and discuss diltiazem. We gave her prn diltiazem qty 15 on 9/19/22 and received the first refill request on 10/1/22 suggesting she is taking it often. Sent a couple MyC msgs to patient inquiring about this on 10/5/22 and 10/14/22 without response. Denying Rx until writer hears back from patient.

## 2022-11-09 ENCOUNTER — VIRTUAL VISIT (OUTPATIENT)
Dept: OTOLARYNGOLOGY | Facility: CLINIC | Age: 52
End: 2022-11-09

## 2022-11-09 DIAGNOSIS — R06.02 SOB (SHORTNESS OF BREATH): ICD-10-CM

## 2022-11-09 DIAGNOSIS — J38.7 LARYNGEAL HYPERFUNCTION: ICD-10-CM

## 2022-11-09 DIAGNOSIS — R49.0 DYSPHONIA: Primary | ICD-10-CM

## 2022-11-09 PROCEDURE — 99207 PR NO CHARGE LOS: CPT | Performed by: SPEECH-LANGUAGE PATHOLOGIST

## 2022-11-09 NOTE — LETTER
11/9/2022       RE: Hailey Stark  [address]  [facility] [address]     Dear Colleague,    Thank you for referring your patient, Hailey Stark, to the [facility] VOICE CLINIC [facility]. Please see a copy of my visit note below.    Hailey Stark is a 52 year old female who is being cared for via a billable virtual visit.        The patient has been notified and verbally consented to the following statements:     This video visit will be conducted between you and your provider.    If during the course of the call the provider feels a video visit is not appropriate, you will not be charged for this service.    Provider has received verbal consent for billable virtual visit from the patient? Yes    Preferred method for receiving information: [employer]     Call initiated at: 2 PM  Platform used to conduct today's virtual appointment: [employer] Well Video  Location of provider: Residence  Location of patient: Residence      I was not able to reach Hailey for her appointment today.  Phone calls did not go through to the number on file.      Juliette Talley M.M. (voice), MNICHOLAS, CCC/SLP  Speech-Language Pathologist  [facility] Trained Vocologist  [facility]  [phone]  [email or web address]  Pronouns: she/her      *this report was created in part through the use of computerized dictation software, and though reviewed following completion, some typographic errors may persist.  If there is confusion regarding any of this notes contents, please contact me for clarification        Again, thank you for allowing me to participate in the care of your patient.      Sincerely,    Juliette Talley, SLP

## 2022-11-10 NOTE — PROGRESS NOTES
Hailey Stark is a 52 year old female who is being cared for via a billable virtual visit.        The patient has been notified and verbally consented to the following statements:     This video visit will be conducted between you and your provider.    If during the course of the call the provider feels a video visit is not appropriate, you will not be charged for this service.    Provider has received verbal consent for billable virtual visit from the patient? Yes    Preferred method for receiving information: [employer]     Call initiated at: 2 PM  Platform used to conduct today's virtual appointment: [employer] Well Video  Location of provider: Residence  Location of patient: Residence      I was not able to reach Hailey for her appointment today.  Phone calls did not go through to the number on file.      Juliette Talley M.M. (voice), M.A., CCC/SLP  Speech-Language Pathologist  [place] Trained Vocologist  [facility]  [phone]  [email or web address]  Pronouns: she/her      *this report was created in part through the use of computerized dictation software, and though reviewed following completion, some typographic errors may persist.  If there is confusion regarding any of this notes contents, please contact me for clarification

## 2022-11-19 ENCOUNTER — HEALTH MAINTENANCE LETTER (OUTPATIENT)
Age: 52
End: 2022-11-19

## 2022-11-25 ENCOUNTER — NURSE TRIAGE (OUTPATIENT)
Dept: NURSING | Facility: CLINIC | Age: 52
End: 2022-11-25

## 2022-11-25 ENCOUNTER — VIRTUAL VISIT (OUTPATIENT)
Dept: FAMILY MEDICINE | Facility: CLINIC | Age: 52
End: 2022-11-25
Payer: OTHER MISCELLANEOUS

## 2022-11-25 ENCOUNTER — MYC MEDICAL ADVICE (OUTPATIENT)
Dept: FAMILY MEDICINE | Facility: CLINIC | Age: 52
End: 2022-11-25

## 2022-11-25 DIAGNOSIS — U07.1 INFECTION DUE TO 2019 NOVEL CORONAVIRUS: Primary | ICD-10-CM

## 2022-11-25 PROCEDURE — 99213 OFFICE O/P EST LOW 20 MIN: CPT | Mod: 95 | Performed by: NURSE PRACTITIONER

## 2022-11-25 RX ORDER — BENZONATATE 200 MG/1
200 CAPSULE ORAL 3 TIMES DAILY PRN
Qty: 45 CAPSULE | Refills: 0 | Status: SHIPPED | OUTPATIENT
Start: 2022-11-25 | End: 2023-10-01

## 2022-11-25 NOTE — PROGRESS NOTES
"Hailey is a 52 year old who is being evaluated via a billable video visit.      How would you like to obtain your AVS? MyChart  If the video visit is dropped, the invitation should be resent by: Text to cell phone: [phone]  Will anyone else be joining your video visit? No          Assessment & Plan     Infection due to 2019 novel coronavirus    - molnupiravir (LAGEVRIO) 200 MG capsule; Take 4 capsules (800 mg) by mouth every 12 hours for 5 days  - benzonatate (TESSALON) 200 MG capsule; Take 1 capsule (200 mg) by mouth 3 times daily as needed for cough             BMI:   Estimated body mass index is 27.3 kg/m  as calculated from the following:    Height as of 8/31/22: 1.768 m (5' 9.61\").    Weight as of 8/31/22: 85.3 kg (188 lb 1.6 oz).       See Patient Instructions    No follow-ups on file.    Shanti Jaramillo, APRN CNP  [facility]    Subjective   Hailey is a 52 year old, presenting for the following health issues:  No chief complaint on file.      HPI       COVID-19 Symptom Review  How many days ago did these symptoms start? Three days- tested positive two days ago    Are any of the following symptoms significant for you?    New or worsening difficulty breathing? No    Worsening cough? Yes, it's a dry cough.     Fever or chills? Yes, I felt feverish or had chills.    Headache: YES    Sore throat: YES    Chest pain: No    Diarrhea: No    Body aches? No    What treatments has patient tried? Tylenol, Excedrin, aleve   Does patient live in a nursing home, group home, or shelter? No  Does patient have a way to get food/medications during quarantined? Yes, I have a friend or family member who can help me.        Review of Systems   Constitutional, HEENT, cardiovascular, pulmonary, gi and gu systems are negative, except as otherwise noted.      Objective           Vitals:  No vitals were obtained today due to virtual visit.    Physical Exam   GENERAL: Healthy, alert and no distress  EYES: Eyes " grossly normal to inspection.  No discharge or erythema, or obvious scleral/conjunctival abnormalities.  RESP: No audible wheeze, cough, or visible cyanosis.  No visible retractions or increased work of breathing.    SKIN: Visible skin clear. No significant rash, abnormal pigmentation or lesions.  NEURO: Cranial nerves grossly intact.  Mentation and speech appropriate for age.  PSYCH: Mentation appears normal, affect normal/bright, judgement and insight intact, normal speech and appearance well-groomed.                Video-Visit Details    Video Start Time: 4:11 PM    Type of service:  Video Visit    Video End Time:4:21 PM    Originating Location (pt. Location): Home        Distant Location (provider location):  On-site    Platform used for Video Visit: Prema

## 2022-11-25 NOTE — TELEPHONE ENCOUNTER
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     Pt reports that she became sick on Tuesday and that is the same day she developed sx:   Sore throat  Runny nose  Fevers broke yesterday (102.8F, 103.3F)  Cough  Chills   Tired  Headache  T: 98.7    Taking:   Tylenol  Aleve  Excedrin    Pt still has her chest pain and shortness of breath, but it is not new or worsening since the start of this illness and has been present since 2020.     Disposition: Virtual Appt. Pt was given the care advice and was transferred to make an appt with a provider.     Maria Elena Gaffney RN  [facility] Nurse Advisor 12:10 PM 11/25/2022    Reason for Disposition    HIGH RISK for severe COVID complications (e.g., weak immune system, age > 64 years, obesity with BMI > 25, pregnant, chronic lung disease or other chronic medical condition) (Exception: Already seen by PCP and no new or worsening symptoms.)    Additional Information    Negative: SEVERE difficulty breathing (e.g., struggling for each breath, speaks in single words)    Negative: Difficult to awaken or acting confused (e.g., disoriented, slurred speech)    Negative: Shock suspected (e.g., cold/pale/clammy skin, too weak to stand, low BP, rapid pulse)    Negative: Sounds like a life-threatening emergency to the triager    Negative: Bluish (or gray) lips or face now    Negative: [1] Diagnosed or suspected COVID-19 AND [2] symptoms lasting 3 or more weeks    Negative: [1] COVID-19 exposure AND [2] no symptoms    Negative: COVID-19 vaccine reaction suspected (e.g., fever, headache, muscle aches) occurring 1 to 3 days after getting vaccine    Negative: COVID-19 vaccine, questions about    Negative: [1] Lives with someone known to have influenza (flu test " positive) AND [2] flu-like symptoms (e.g., cough, runny nose, sore throat, SOB; with or without fever)    Negative: [1] Adult with possible COVID-19 symptoms AND [2] triager concerned about severity of symptoms or other causes    Negative: COVID-19 and breastfeeding, questions about    Negative: SEVERE or constant chest pain or pressure  (Exception: Mild central chest pain, present only when coughing.)    Negative: MODERATE difficulty breathing (e.g., speaks in phrases, SOB even at rest, pulse 100-120)    Negative: Oxygen level (e.g., pulse oximetry) 90 percent or lower    Negative: Headache and stiff neck (can't touch chin to chest)    Negative: Chest pain or pressure    Negative: Patient sounds very sick or weak to the triager    Negative: MILD difficulty breathing (e.g., minimal/no SOB at rest, SOB with walking, pulse <100)    Negative: Fever > 103 F (39.4 C)    Negative: [1] Fever > 101 F (38.3 C) AND [2] over 60 years of age    Negative: [1] Fever > 100.0 F (37.8 C) AND [2] bedridden (e.g., nursing home patient, CVA, chronic illness, recovering from surgery)    Protocols used: CORONAVIRUS (COVID-19) DIAGNOSED OR [id number] 1.18.2022    COVID 19 Nurse Triage Plan/Patient Instructions    Please be aware that novel coronavirus (COVID-19) may be circulating in the community. If you develop symptoms such as fever, cough, or SOB or if you have concerns about the presence of another infection including coronavirus (COVID-19), please contact your health care provider or visit [email or web address].     Disposition/Instructions    Virtual Visit with provider recommended. Reference Visit Selection Guide.    Thank you for taking steps to prevent the spread of this virus.  o Limit your contact with others.  o Wear a simple mask to cover your cough.  o Wash your hands well and often.    Resources    [facility]: About COVID-19: [email or web address]    CDC: What to Do If You're Sick:  www.cdc.gov/coronavirus/2019-ncov/about/steps-when-sick.html    CDC: Ending Home Isolation: www.cdc.gov/coronavirus/2019-ncov/hcp/disposition-in-home-patients.html     CDC: Caring for Someone: www.cdc.gov/coronavirus/2019-ncov/if-you-are-sick/care-for-someone.html     [facility]: Interim Guidance for Hospital Discharge to Home: [email or web address]/coronavirus/hcp/hospdischarge.pdf    [facility] clinical trials (COVID-19 research studies): clinicalaffairs.[facility]-clinical-trials     Below are the COVID-19 hotlines at the [place] Department of [facility] ([facility]). Interpreters are available.   o For health questions: Call 402-064-8111 or 1-638.110.1258 (7 a.m. to 7 p.m.)  o For questions about schools and childcare: Call 472-272-9219 or 1-218.355.9522 (7 a.m. to 7 p.m.)

## 2022-11-29 ENCOUNTER — TELEPHONE (OUTPATIENT)
Dept: FAMILY MEDICINE | Facility: CLINIC | Age: 52
End: 2022-11-29

## 2022-11-29 NOTE — TELEPHONE ENCOUNTER
"Left message on patient's voicemail to call back and speak with a triage nurse.         See 11/25 myC encounter. Pt states:  \"Can I get a chest x ray.?  It's hard to breathe laying down.  I feel like sometimes there is phlegm blocking my airway even though my cough is non productive and my sats will drop to 87 laying down. I want to make sure I'm not getting pneumonia.\"    Need to triage.    JOSUE MontesN RN  [facility]      "

## 2022-11-29 NOTE — TELEPHONE ENCOUNTER
I called pt due to concerning information regarding O2 sats at 87% laying down and trouble breathing.    See TE 11/29    Also responded on myChart to go to ER if persistent low O2 sats and SOB    JOSUE MontesN RN  [facility]

## 2022-11-29 NOTE — TELEPHONE ENCOUNTER
Writer responded via [employer]Self.    JOSUE TranN, RN-BC  [facility]

## 2022-12-01 NOTE — TELEPHONE ENCOUNTER
See 11/25/22 MyChart encounter.    JOSUE TranN, RN-BC  [facility]

## 2022-12-05 ENCOUNTER — OFFICE VISIT (OUTPATIENT)
Dept: ENDOCRINOLOGY | Facility: CLINIC | Age: 52
End: 2022-12-05
Attending: STUDENT IN AN ORGANIZED HEALTH CARE EDUCATION/TRAINING PROGRAM
Payer: COMMERCIAL

## 2022-12-05 ENCOUNTER — LAB (OUTPATIENT)
Dept: LAB | Facility: CLINIC | Age: 52
End: 2022-12-05
Payer: COMMERCIAL

## 2022-12-05 ENCOUNTER — PRE VISIT (OUTPATIENT)
Dept: ENDOCRINOLOGY | Facility: CLINIC | Age: 52
End: 2022-12-05

## 2022-12-05 VITALS — BODY MASS INDEX: 27.93 KG/M2 | DIASTOLIC BLOOD PRESSURE: 80 MMHG | SYSTOLIC BLOOD PRESSURE: 132 MMHG | WEIGHT: 192.5 LBS

## 2022-12-05 DIAGNOSIS — Z87.442 HISTORY OF KIDNEY STONES: ICD-10-CM

## 2022-12-05 DIAGNOSIS — E03.9 HYPOTHYROIDISM, UNSPECIFIED TYPE: Primary | ICD-10-CM

## 2022-12-05 DIAGNOSIS — R63.5 ABNORMAL WEIGHT GAIN: ICD-10-CM

## 2022-12-05 DIAGNOSIS — R73.03 PREDIABETES: ICD-10-CM

## 2022-12-05 DIAGNOSIS — R53.83 OTHER FATIGUE: ICD-10-CM

## 2022-12-05 PROCEDURE — 99205 OFFICE O/P NEW HI 60 MIN: CPT | Performed by: INTERNAL MEDICINE

## 2022-12-05 ASSESSMENT — PAIN SCALES - GENERAL: PAINLEVEL: NO PAIN (0)

## 2022-12-05 NOTE — LETTER
12/5/2022       RE: Hailey Stark  [address]  [place] [address]     Dear Colleague,    Thank you for referring your patient, Hailey Stark, to the [facility] [place] [facility]. Please see a copy of my visit note below.      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  Her family history is positive for Hashimoto thyroiditis and goiter in her mother.  The fatigue did not improve with treatment with levothyroxine, despite normalization of the thyroid hormone levels.    1.  Hypothyroidism, most likely due to Hashimoto thyroiditis although subacute thyroiditis due to COVID remains in the differential diagnosis  Independent of the etiology, treatment consists of levothyroxine.  I counseled the patient on the correct administration of this medication, the main minerals, food products and medications which affect the levothyroxine absorption.    2.  Prediabetes, initially diagnosed in 2021  In the context of weight gain, follow-up A1c    3.  Unexpected weight gain  Overall low suspicious for Cushing syndrome.  Advised to screen for this by checking 24-hour urinary cortisol.  Counseled the patient on the correct 24-hour urine collection.    4.  Fatigue, history of kidney stones  Follow-up BMP, albumin    5.  Perimenopausal status  Recommended a daily dose of vitamin D of 1000 to 2000 units, in the absence of sun exposure, and a daily dose of calcium from food products and/or supplements of 1000 mg.     Orders Placed This Encounter   Procedures     Cortisol free  urine     Hemoglobin A1c     TSH     T4 free     Basic metabolic panel     Albumin level     Creatinine timed urine     The patient is seen in consultation at the request of Dr. Monisha Genao.     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    The patient describes being diagnosed with chronic fatigue syndrome around age 21.  Fatigue has been a recurrent issue throughout her life.  However, it became extremely pronounced following the COVID infection from November 2020.  She feels exhausted.  At work, she mostly works nights.  During days off work, she sleeps most of the time.  She wakes up tired.  Her weight is up 12 pounds in the last year, in the absence of dietary changes.  She has always been compliant with a vegan diet, has since age 19.  She does not have a formal exercise schedule but she reports being physically active at work, on her feet.    As part of evaluation for fatigue, she had a thyroid hormone levels checked in January 2022, when TSH was 5.64.  Prior TSH values have been normal. Follow-up lab work from February revealed a persistently elevated TSH at 4.16.  Subsequently, the patient was started on treatment with 50 mcg levothyroxine daily on 3/1/2022.  TSH normalized in April, at 1.82.  The patient reports being compliant in taking levothyroxine iron in the stomach with water, hours before having breakfast.  The fatigue did not improved since taking levothyroxine.  Her mother had a thyroid goiter removed surgically.  Her sister was diagnosed with Hashimoto disease in her 20s.    Hailey feels her neck is getting bigger (points towards the submandibular area).  In November this year, she was diagnosed again with COVID infection.       In the past, she did try phentermine for weight loss, but no significant success.     Pertinent labs reviewed:  8/2020 ferritin 15, vitamin D 16   1/31/22 HbA1c 5.9   3/19/21 HbA1c 5.7   GFR 60-70s     Her heart rate is now well controlled with diltiazem.  With exertion, her heart rate might increase in the 110s to 120s.    The DEXA scan from 2008 revealed normal BMD for age, with the following T-scores:              Lumbar Spine L1-L4:  Z-score 1.4   Left Femoral Neck:  Z-score 0.1     Right Femoral Neck:  Z-score -0.3     Past Medical History   Past Medical History:   Diagnosis Date     Anxiety state, unspecified     Anxiety     Cardiac dysrhythmia, unspecified     Arrhythmia NOS s/p ablation     Chronic peptic ulcer, unspecified site, without mention of hemorrhage, perforation, or obstruction     Ulcer, Peptic     Depressive disorder, not elsewhere classified     Depression (non-psychotic)     Leukorrhea, not specified as infective 11/18/2009    heavy, daily, yellow/green mucoid dischg following retained tampon removal.Tx w flagyl, metrogel, Cleocin, Diflucan, doxycycline, boric acid capsules. 5/16/2011 + GBS.      Menarche age 13    cycles q 30 x 5 d, heavy     Moderate dysplasia of cervix (KENNY II) 1998     Normal Papssince LEEP->routine screening   2012 calcium oxalate kidney stones  Chronic fatigue syndrome     Past Surgical History   Past Surgical History:   Procedure Laterality Date     CARDIAC SURGERY  2000    Catheter ablation     Cervical Conization Loop Electrode Excision  1998    KENNY II  F/U Pap q 3 mo x 2 yrs were all NORMAL     COSMETIC SURGERY  2005& 2006    Liposuction     EYE SURGERY  2006    Lasix     KIDNEY SURGERY  summer 2012    6 kidney stone excision/stent placed     LASIK       ORTHOPEDIC SURGERY  2012    Bunionectomy     SURGICAL HISTORY OF -       lasik     SURGICAL HISTORY OF -       catheter ablation heart atrial fib tx     VASCULAR SURGERY  Feb. 2020    Adhesive ablation    Buniectomy 2011 left foot - fracture with trauma     Current Medications    Current Outpatient Medications:      albuterol (PROAIR HFA/PROVENTIL HFA/VENTOLIN HFA) 108 (90 Base) MCG/ACT inhaler, Inhale 2 puffs into the lungs every 6 hours, Disp: 18 g, Rfl: 1     benzonatate (TESSALON) 200 MG capsule, Take 1 capsule (200 mg) by mouth 3 times daily as needed for cough, Disp: 45 capsule, Rfl: 0     buPROPion (WELLBUTRIN XL) 150 MG 24 hr tablet, Take 1 tablet (150 mg) by mouth every morning, Disp: 90 tablet, Rfl: 3     diltiazem (CARDIZEM) 30 MG tablet, Take 1 tablet (30 mg) by mouth as needed (at onset of tachycardia), Disp: 15 tablet, Rfl: 0     diltiazem ER COATED BEADS (CARDIZEM CD/CARTIA XT) 120 MG 24 hr capsule, Take 1 capsule (120 mg) by mouth daily, Disp: 90 capsule, Rfl: 3     fluticasone-salmeterol (ADVAIR) 250-50 MCG/DOSE inhaler, Inhale 1 puff into the lungs every 12 hours, Disp: 2 each, Rfl: 1     ibuprofen (ADVIL,MOTRIN) 800 MG tablet, Take 1 tablet (800 mg) by mouth every 8 hours as needed for moderate pain, Disp: 30 tablet, Rfl: 0     levothyroxine (SYNTHROID/LEVOTHROID) 50 MCG tablet, Take 1 tablet (50 mcg) by mouth daily, Disp: 90 tablet, Rfl: 3     PARoxetine (PAXIL) 20 MG tablet, Take 1 tablet (20 mg) by mouth every morning, Disp: 90 tablet, Rfl: 3     Pilocarpine HCl 1.25 % SOLN, Apply 1 drop to eye every morning, Disp: 2.5 mL, Rfl: 0    Family History   Problem Relation Age of Onset     Thyroid Disease Mother         removed     Cancer Paternal Grandmother         stomach cancer     Progressive supranuclear palsy Paternal Grandmother      Depression Sister      Thyroid Disease Sister      Diabetes type 2  Brother      Depression Brother      Diabetes type 1  Nephew      Social History  Single. No children.  She denies smoking, drinks alcohol every few months. Denies using illicit drugs. Occupation: LPN.     Review of Systems   Systemic:             As above   Eye:                      Prescription  glasses - on prescription [employer]   Rubens-Laryngeal:     No dysphagia, no hoarseness, dry cough since being recently diagnosed with Covid   Breast:                  No breast symptoms  Cardiovascular:    palpitations improved with diltiazem; chest heaviness present since Covid   Pulmonary:           As above    Gastrointestinal:   No gastrointestinal symptoms, no diarrhea or constipation   Genitourinary:      increased thirst and urination; urinates 2-3 times a night - more often in the last few months    Endocrine:            Menstrual periods have remained regular.  For the last couple of years she has been experiencing hot flashes, now present a few times a week.  Neurological:        Headaches - more frequent for the last month, no tremor, no numbness or tingling sensation, no dizziness ; restless feet at night; cramps in her hands - longstanding, no muscle weakness  Musculoskeletal:  No musculoskeletal symptoms, no muscle or joint pain; teeth are getting wiggly   Skin:                     No dry skin, no hair falling out, no new stretch marks, no significant facial hair, no acne; easy bruising noticed for the last year or so  Psychological:     No psychological symptoms                 Vital Signs     Previous Weights:    Wt Readings from Last 10 Encounters:   12/05/22 87.3 kg (192 lb 8 oz)   08/31/22 85.3 kg (188 lb 1.6 oz)   06/13/22 84.7 kg (186 lb 12.8 oz)   06/07/22 83.9 kg (185 lb)   04/19/22 84.4 kg (186 lb)   04/18/22 84.4 kg (186 lb)   04/12/22 81.6 kg (180 lb)   02/22/22 81.6 kg (180 lb)   01/31/22 84.4 kg (186 lb 1.9 oz)   08/27/21 81.6 kg (180 lb)        /80 (BP Location: Right arm, Patient Position: Sitting, Cuff Size: Adult Regular)   Wt 87.3 kg (192 lb 8 oz)   BMI 27.93 kg/m      Physical Exam  General Appearance: Obesity, mainly central, mild buffalo hump, round face     Eyes:  conjutivae and extra-ocular motions are normal.                                    pupils round and reactive to  light, no lid lag, no stare    HEENT:   oropharynx clear and moist, no JVD, no bruits      no thyromegaly, no palpable nodules   Cardiovascular:  regular rhythm, no murmurs, distal pulse palpable, no edema  Respiratory:        chest clear, no rales, no rhonchi   Gastrointestinal:  abdomen soft, non-tender, non-distended, normal bowel sounds,    no organomegaly  Musculoskeletal:  normal tone and strength  Psychological:          affect and judgment normal  Skin:  warm, no lesions  Neurological:  reflexes normal and symmetric, no resting tremor.     Lab Results  I reviewed prior lab results documented in Epic.  TSH   Date Value Ref Range Status   04/12/2022 1.82 0.40 - 4.00 mU/L Final   02/28/2022 4.16 (H) 0.40 - 4.00 mU/L Final   01/31/2022 5.64 (H) 0.40 - 4.00 mU/L Final   03/19/2021 2.65 0.40 - 4.00 mU/L Final   08/07/2020 3.24 0.40 - 4.00 mU/L Final   12/19/2013 2.51 0.4 - 5.0 mU/L Final   06/27/2012 1.55 0.4 - 5.0 mU/L Final   03/04/2005 2.40 0.4 - 5.0 mU/L Final     65 minutes spent on the date of the encounter doing chart review, history and exam, documentation and further activities as noted above.       Airam Velázquez MD

## 2022-12-05 NOTE — PROGRESS NOTES
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  Her family history is positive for Hashimoto thyroiditis and goiter in her mother.  The fatigue did not improve with treatment with levothyroxine, despite normalization of the thyroid hormone levels.    1.  Hypothyroidism, most likely due to Hashimoto thyroiditis although subacute thyroiditis due to COVID remains in the differential diagnosis  Independent of the etiology, treatment consists of levothyroxine.  I counseled the patient on the correct administration of this medication, the main minerals, food products and medications which affect the levothyroxine absorption.    2.  Prediabetes, initially diagnosed in 2021  In the context of weight gain, follow-up A1c    3.  Unexpected weight gain  Overall low suspicious for Cushing syndrome.  Advised to screen for this by checking 24-hour urinary cortisol.  Counseled the patient on the correct 24-hour urine collection.    4.  Fatigue, history of kidney stones  Follow-up BMP, albumin    5.  Perimenopausal status  Recommended a daily dose of vitamin D of 1000 to 2000 units, in the absence of sun exposure, and a daily dose of calcium from food products and/or supplements of 1000 mg.     Orders Placed This Encounter   Procedures     Cortisol free urine     Hemoglobin A1c     TSH     T4 free     Basic metabolic panel     Albumin level     Creatinine timed urine     The patient is seen in consultation at the request of Dr. Monisha Genao.     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    The patient describes being diagnosed with chronic fatigue syndrome around age 21.  Fatigue has been a recurrent issue throughout her life.  However, it became extremely pronounced following the COVID infection from November 2020.  She feels exhausted.  At work, she mostly works nights.  During days off work, she sleeps most of the time.  She wakes up tired.  Her weight is up 12 pounds in the last year, in the absence of dietary changes.  She has always been compliant with a vegan diet, has since age 19.  She does not have a formal exercise schedule but she reports being physically active at work, on her feet.    As part of evaluation for fatigue, she had a thyroid hormone levels checked in January 2022, when TSH was 5.64.  Prior TSH values have been normal. Follow-up lab work from February revealed a persistently elevated TSH at 4.16.  Subsequently, the patient was started on treatment with 50 mcg levothyroxine daily on 3/1/2022.  TSH normalized in April, at 1.82.  The patient reports being compliant in taking levothyroxine iron in the stomach with water, hours before having breakfast.  The fatigue did not improved since taking levothyroxine.  Her mother had a thyroid goiter removed surgically.  Her sister was diagnosed with Hashimoto disease in her 20s.    Hailey feels her neck is getting bigger (points towards the submandibular area).  In November this year, she was diagnosed again with COVID infection.      In the past, she did try phentermine for weight loss, but no significant success.     Pertinent labs reviewed:  8/2020 ferritin 15, vitamin D 16   1/31/22 HbA1c 5.9   3/19/21 HbA1c 5.7   GFR 60-70s     Her heart rate is now well controlled with diltiazem.  With exertion, her heart rate might increase in the 110s to  120s.    The DEXA scan from 2008 revealed normal BMD for age, with the following T-scores:              Lumbar Spine L1-L4:  Z-score 1.4   Left Femoral Neck:  Z-score 0.1     Right Femoral Neck:  Z-score -0.3     Past Medical History   Past Medical History:   Diagnosis Date     Anxiety state, unspecified     Anxiety     Cardiac dysrhythmia, unspecified     Arrhythmia NOS s/p ablation     Chronic peptic ulcer, unspecified site, without mention of hemorrhage, perforation, or obstruction     Ulcer, Peptic     Depressive disorder, not elsewhere classified     Depression (non-psychotic)     Leukorrhea, not specified as infective 11/18/2009    heavy, daily, yellow/green mucoid dischg following retained tampon removal.Tx w flagyl, metrogel, Cleocin, Diflucan, doxycycline, boric acid capsules. 5/16/2011 + GBS.      Menarche age 13    cycles q 30 x 5 d, heavy     Moderate dysplasia of cervix (KENNY II) 1998     Normal Papssince LEEP->routine screening   2012 calcium oxalate kidney stones  Chronic fatigue syndrome     Past Surgical History   Past Surgical History:   Procedure Laterality Date     CARDIAC SURGERY  2000    Catheter ablation     Cervical Conization Loop Electrode Excision  1998    KENNY II  F/U Pap q 3 mo x 2 yrs were all NORMAL     COSMETIC SURGERY  2005& 2006    Liposuction     EYE SURGERY  2006    Lasix     KIDNEY SURGERY  summer 2012    6 kidney stone excision/stent placed     LASIK       ORTHOPEDIC SURGERY  2012    Bunionectomy     SURGICAL HISTORY OF -       lasik     SURGICAL HISTORY OF -       catheter ablation heart atrial fib tx     VASCULAR SURGERY  Feb. 2020    Adhesive ablation   Buniectomy 2011 left foot - fracture with trauma     Current Medications    Current Outpatient Medications:      albuterol (PROAIR HFA/PROVENTIL HFA/VENTOLIN HFA) 108 (90 Base) MCG/ACT inhaler, Inhale 2 puffs into the lungs every 6 hours, Disp: 18 g, Rfl: 1     benzonatate (TESSALON) 200 MG capsule, Take 1 capsule (200 mg) by  mouth 3 times daily as needed for cough, Disp: 45 capsule, Rfl: 0     buPROPion (WELLBUTRIN XL) 150 MG 24 hr tablet, Take 1 tablet (150 mg) by mouth every morning, Disp: 90 tablet, Rfl: 3     diltiazem (CARDIZEM) 30 MG tablet, Take 1 tablet (30 mg) by mouth as needed (at onset of tachycardia), Disp: 15 tablet, Rfl: 0     diltiazem ER COATED BEADS (CARDIZEM CD/CARTIA XT) 120 MG 24 hr capsule, Take 1 capsule (120 mg) by mouth daily, Disp: 90 capsule, Rfl: 3     fluticasone-salmeterol (ADVAIR) 250-50 MCG/DOSE inhaler, Inhale 1 puff into the lungs every 12 hours, Disp: 2 each, Rfl: 1     ibuprofen (ADVIL,MOTRIN) 800 MG tablet, Take 1 tablet (800 mg) by mouth every 8 hours as needed for moderate pain, Disp: 30 tablet, Rfl: 0     levothyroxine (SYNTHROID/LEVOTHROID) 50 MCG tablet, Take 1 tablet (50 mcg) by mouth daily, Disp: 90 tablet, Rfl: 3     PARoxetine (PAXIL) 20 MG tablet, Take 1 tablet (20 mg) by mouth every morning, Disp: 90 tablet, Rfl: 3     Pilocarpine HCl 1.25 % SOLN, Apply 1 drop to eye every morning, Disp: 2.5 mL, Rfl: 0    Family History   Problem Relation Age of Onset     Thyroid Disease Mother         removed     Cancer Paternal Grandmother         stomach cancer     Progressive supranuclear palsy Paternal Grandmother      Depression Sister      Thyroid Disease Sister      Diabetes type 2  Brother      Depression Brother      Diabetes type 1  Nephew      Social History  Single. No children.  She denies smoking, drinks alcohol every few months. Denies using illicit drugs. Occupation: LPN.     Review of Systems   Systemic:             As above   Eye:                      Prescription glasses - on prescription [employer]   Rubens-Laryngeal:     No dysphagia, no hoarseness, dry cough since being recently diagnosed with Covid   Breast:                  No breast symptoms  Cardiovascular:    palpitations improved with diltiazem; chest heaviness present since Covid   Pulmonary:           As above    Gastrointestinal:    No gastrointestinal symptoms, no diarrhea or constipation   Genitourinary:      increased thirst and urination; urinates 2-3 times a night - more often in the last few months    Endocrine:            Menstrual periods have remained regular.  For the last couple of years she has been experiencing hot flashes, now present a few times a week.  Neurological:        Headaches - more frequent for the last month, no tremor, no numbness or tingling sensation, no dizziness ; restless feet at night; cramps in her hands - longstanding, no muscle weakness  Musculoskeletal:  No musculoskeletal symptoms, no muscle or joint pain; teeth are getting wiggly   Skin:                     No dry skin, no hair falling out, no new stretch marks, no significant facial hair, no acne; easy bruising noticed for the last year or so  Psychological:     No psychological symptoms                 Vital Signs     Previous Weights:    Wt Readings from Last 10 Encounters:   12/05/22 87.3 kg (192 lb 8 oz)   08/31/22 85.3 kg (188 lb 1.6 oz)   06/13/22 84.7 kg (186 lb 12.8 oz)   06/07/22 83.9 kg (185 lb)   04/19/22 84.4 kg (186 lb)   04/18/22 84.4 kg (186 lb)   04/12/22 81.6 kg (180 lb)   02/22/22 81.6 kg (180 lb)   01/31/22 84.4 kg (186 lb 1.9 oz)   08/27/21 81.6 kg (180 lb)        /80 (BP Location: Right arm, Patient Position: Sitting, Cuff Size: Adult Regular)   Wt 87.3 kg (192 lb 8 oz)   BMI 27.93 kg/m      Physical Exam  General Appearance: Obesity, mainly central, mild buffalo hump, round face     Eyes:  conjutivae and extra-ocular motions are normal.                                    pupils round and reactive to light, no lid lag, no stare    HEENT:   oropharynx clear and moist, no JVD, no bruits      no thyromegaly, no palpable nodules   Cardiovascular:  regular rhythm, no murmurs, distal pulse palpable, no edema  Respiratory:        chest clear, no rales, no rhonchi   Gastrointestinal:  abdomen soft, non-tender, non-distended, normal  bowel sounds,    no organomegaly  Musculoskeletal:  normal tone and strength  Psychological:          affect and judgment normal  Skin:  warm, no lesions  Neurological:  reflexes normal and symmetric, no resting tremor.     Lab Results  I reviewed prior lab results documented in Epic.  TSH   Date Value Ref Range Status   04/12/2022 1.82 0.40 - 4.00 mU/L Final   02/28/2022 4.16 (H) 0.40 - 4.00 mU/L Final   01/31/2022 5.64 (H) 0.40 - 4.00 mU/L Final   03/19/2021 2.65 0.40 - 4.00 mU/L Final   08/07/2020 3.24 0.40 - 4.00 mU/L Final   12/19/2013 2.51 0.4 - 5.0 mU/L Final   06/27/2012 1.55 0.4 - 5.0 mU/L Final   03/04/2005 2.40 0.4 - 5.0 mU/L Final     65 minutes spent on the date of the encounter doing chart review, history and exam, documentation and further activities as noted above.

## 2022-12-05 NOTE — PATIENT INSTRUCTIONS
Instructions for Collection of a 24 hour or Timed Urine Specimen    Your doctor has requested that you collect all of your urine for a 24 hour urine lab test. Please follow the instructions below    1. The day before you are to bring your specimen:  At 7:00am (or when you get up the day) empty your bladder as completely as possible. Discard this specimen.    2. During the 24 hour collection period store the collection container in a cool place or in the refrigerator. Some collections require a special preservative that will be in the container if required.    3. At 7:00am the next day (or when you started your urine collection on the previous day) void and add to the collection container. Record the time and date of the end of the collection period. This completes the 24 hour urine collection.    4. Bring the entire specimen directly to the lab  A lab with your name, medical record number and date of birth must be attached to the urine container.  A lab request form completed by your clinic/doctor with your name medical number number, date of birth and test requested must accompany the urine specimen.  Record on the lab request form the date and time (am/pm) of the start of the urine collection and date and time of the end of the urine collection.    5. If you have any questions, feel free to call the laboratory at [phone] or [phone] or your clinic.    Try to have a daily intake of calcium from food products and/or supplements of 1000 mg   Daily dose of vitamin d 3 is 6921-2005 units in the absence of sun exposure

## 2022-12-09 ENCOUNTER — LAB (OUTPATIENT)
Dept: LAB | Facility: CLINIC | Age: 52
End: 2022-12-09
Payer: COMMERCIAL

## 2022-12-09 DIAGNOSIS — E03.9 ACQUIRED HYPOTHYROIDISM: ICD-10-CM

## 2022-12-09 DIAGNOSIS — E03.9 HYPOTHYROIDISM, UNSPECIFIED TYPE: ICD-10-CM

## 2022-12-09 DIAGNOSIS — R53.83 OTHER FATIGUE: ICD-10-CM

## 2022-12-09 DIAGNOSIS — E78.5 HYPERLIPIDEMIA WITH TARGET LDL LESS THAN 100: ICD-10-CM

## 2022-12-09 DIAGNOSIS — R73.03 PREDIABETES: ICD-10-CM

## 2022-12-09 LAB
ALBUMIN SERPL BCG-MCNC: 3.9 G/DL (ref 3.5–5.2)
ANION GAP SERPL CALCULATED.3IONS-SCNC: 9 MMOL/L (ref 7–15)
BUN SERPL-MCNC: 10.4 MG/DL (ref 6–20)
CALCIUM SERPL-MCNC: 8.7 MG/DL (ref 8.6–10)
CHLORIDE SERPL-SCNC: 104 MMOL/L (ref 98–107)
CHOLEST SERPL-MCNC: 217 MG/DL
CREAT SERPL-MCNC: 0.95 MG/DL (ref 0.51–0.95)
DEPRECATED HCO3 PLAS-SCNC: 26 MMOL/L (ref 22–29)
GFR SERPL CREATININE-BSD FRML MDRD: 72 ML/MIN/1.73M2
GLUCOSE SERPL-MCNC: 97 MG/DL (ref 70–99)
HBA1C MFR BLD: 6.3 %
HDLC SERPL-MCNC: 54 MG/DL
LDLC SERPL CALC-MCNC: 123 MG/DL
NONHDLC SERPL-MCNC: 163 MG/DL
POTASSIUM SERPL-SCNC: 3.9 MMOL/L (ref 3.4–5.3)
SODIUM SERPL-SCNC: 139 MMOL/L (ref 136–145)
T4 FREE SERPL-MCNC: 1.1 NG/DL (ref 0.9–1.7)
TRIGL SERPL-MCNC: 198 MG/DL
TSH SERPL DL<=0.005 MIU/L-ACNC: 1.85 UIU/ML (ref 0.3–4.2)

## 2022-12-09 PROCEDURE — 84439 ASSAY OF FREE THYROXINE: CPT | Performed by: PATHOLOGY

## 2022-12-09 PROCEDURE — 36415 COLL VENOUS BLD VENIPUNCTURE: CPT | Performed by: PATHOLOGY

## 2022-12-09 PROCEDURE — 82040 ASSAY OF SERUM ALBUMIN: CPT | Performed by: PATHOLOGY

## 2022-12-09 PROCEDURE — 84443 ASSAY THYROID STIM HORMONE: CPT | Performed by: PATHOLOGY

## 2022-12-09 PROCEDURE — 83036 HEMOGLOBIN GLYCOSYLATED A1C: CPT | Performed by: NURSE PRACTITIONER

## 2022-12-09 PROCEDURE — 80048 BASIC METABOLIC PNL TOTAL CA: CPT | Performed by: PATHOLOGY

## 2022-12-09 PROCEDURE — 80061 LIPID PANEL: CPT | Performed by: PATHOLOGY

## 2022-12-09 NOTE — RESULT ENCOUNTER NOTE
The thyroid hormone levels remain in the normal range.  You may continue to take the same dose of levothyroxine and have them rechecked in 6 months.  Your blood sugar remains minimally elevated, in the prediabetic range.  The other lab results are normal.  Please let me know if you have questions.

## 2022-12-13 LAB
COLLECT DURATION TIME UR: 24 H
CREAT 24H UR-MRATE: 1.15 G/(24.H) (ref 0.72–1.51)
CREAT UR-MCNC: 164 MG/DL
SPECIMEN VOL UR: 700 ML

## 2022-12-13 PROCEDURE — 82530 CORTISOL FREE: CPT | Mod: 90 | Performed by: PATHOLOGY

## 2022-12-13 PROCEDURE — 99000 SPECIMEN HANDLING OFFICE-LAB: CPT | Performed by: PATHOLOGY

## 2022-12-13 PROCEDURE — 81050 URINALYSIS VOLUME MEASURE: CPT

## 2022-12-16 LAB
ANNOTATION COMMENT IMP: NORMAL
CORTIS F 24H UR HPLC-MCNC: 35.8 UG/L
CORTIS F 24H UR-MRATE: 25.1 UG/D
CORTIS F/CREAT 24H UR: 20.94 UG/G CRT
CREAT 24H UR-MRATE: 1197 MG/D
CREAT UR-MCNC: 171 MG/DL

## 2023-01-31 ENCOUNTER — TELEPHONE (OUTPATIENT)
Dept: FAMILY MEDICINE | Facility: CLINIC | Age: 53
End: 2023-01-31

## 2023-01-31 ENCOUNTER — VIRTUAL VISIT (OUTPATIENT)
Dept: FAMILY MEDICINE | Facility: CLINIC | Age: 53
End: 2023-01-31
Payer: COMMERCIAL

## 2023-01-31 DIAGNOSIS — I47.10 SUPRAVENTRICULAR TACHYCARDIA (H): ICD-10-CM

## 2023-01-31 DIAGNOSIS — F32.0 MILD MAJOR DEPRESSION (H): ICD-10-CM

## 2023-01-31 DIAGNOSIS — E66.09 CLASS 1 OBESITY DUE TO EXCESS CALORIES WITH SERIOUS COMORBIDITY AND BODY MASS INDEX (BMI) OF 30.0 TO 30.9 IN ADULT: Primary | ICD-10-CM

## 2023-01-31 DIAGNOSIS — R73.03 PREDIABETES: ICD-10-CM

## 2023-01-31 DIAGNOSIS — F41.1 ANXIETY STATE: ICD-10-CM

## 2023-01-31 DIAGNOSIS — E66.811 CLASS 1 OBESITY DUE TO EXCESS CALORIES WITH SERIOUS COMORBIDITY AND BODY MASS INDEX (BMI) OF 30.0 TO 30.9 IN ADULT: Primary | ICD-10-CM

## 2023-01-31 PROCEDURE — 99214 OFFICE O/P EST MOD 30 MIN: CPT | Mod: VID | Performed by: NURSE PRACTITIONER

## 2023-01-31 RX ORDER — BUPROPION HYDROCHLORIDE 150 MG/1
150 TABLET ORAL EVERY MORNING
Qty: 90 TABLET | Refills: 3 | Status: SHIPPED | OUTPATIENT
Start: 2023-01-31 | End: 2023-06-23

## 2023-01-31 ASSESSMENT — ASTHMA QUESTIONNAIRES
QUESTION_5 LAST FOUR WEEKS HOW WOULD YOU RATE YOUR ASTHMA CONTROL: SOMEWHAT CONTROLLED
QUESTION_4 LAST FOUR WEEKS HOW OFTEN HAVE YOU USED YOUR RESCUE INHALER OR NEBULIZER MEDICATION (SUCH AS ALBUTEROL): ONCE A WEEK OR LESS
QUESTION_3 LAST FOUR WEEKS HOW OFTEN DID YOUR ASTHMA SYMPTOMS (WHEEZING, COUGHING, SHORTNESS OF BREATH, CHEST TIGHTNESS OR PAIN) WAKE YOU UP AT NIGHT OR EARLIER THAN USUAL IN THE MORNING: NOT AT ALL
QUESTION_2 LAST FOUR WEEKS HOW OFTEN HAVE YOU HAD SHORTNESS OF BREATH: MORE THAN ONCE A DAY
ACT_TOTALSCORE: 18
QUESTION_1 LAST FOUR WEEKS HOW MUCH OF THE TIME DID YOUR ASTHMA KEEP YOU FROM GETTING AS MUCH DONE AT WORK, SCHOOL OR AT HOME: NONE OF THE TIME
ACT_TOTALSCORE: 18

## 2023-01-31 ASSESSMENT — PATIENT HEALTH QUESTIONNAIRE - PHQ9
SUM OF ALL RESPONSES TO PHQ QUESTIONS 1-9: 2
10. IF YOU CHECKED OFF ANY PROBLEMS, HOW DIFFICULT HAVE THESE PROBLEMS MADE IT FOR YOU TO DO YOUR WORK, TAKE CARE OF THINGS AT HOME, OR GET ALONG WITH OTHER PEOPLE: SOMEWHAT DIFFICULT
SUM OF ALL RESPONSES TO PHQ QUESTIONS 1-9: 2

## 2023-01-31 NOTE — PROGRESS NOTES
"Hailey is a 52 year old who is being evaluated via a billable video visit.      How would you like to obtain your AVS? MyChart  If the video visit is dropped, the invitation should be resent by: Text to cell phone: [phone]  Will anyone else be joining your video visit? No      Assessment & Plan     Mild major depression (H)  Anxiety state  Stable; continue current regimen; renewed medication  - buPROPion (WELLBUTRIN XL) 150 MG 24 hr tablet  Dispense: 90 tablet; Refill: 3      Class 1 obesity due to excess calories with serious comorbidity and body mass index (BMI) of 30.0 to 30.9 in adult  Prediabetes  Discussed treatment with phentermine, topiramate, naltrexone, glp-1; patient requesting glp therapy;  - Tirzepatide (MOUNJARO) 2.5 MG/0.5ML pen  Dispense: 2 mL; Refill: 0         BMI:   Estimated body mass index is 27.93 kg/m  as calculated from the following:    Height as of 8/31/22: 1.768 m (5' 9.61\").    Weight as of 12/5/22: 87.3 kg (192 lb 8 oz).   Weight management plan: Discussed healthy diet and exercise guidelines        No follow-ups on file.   Follow-up Visit   Expected date:  May 14, 2023 (Approximate)      Follow Up Appointment Details:     Follow-up with whom?: Me    Follow-Up for what?: Other (Office Visit)    Additional Details: weight management    How?: In Person or Virtual    Is this an as-needed follow-up?: No                    MELODIE Houser CNP  [facility]    Subjective   Hailey is a 52 year old, presenting for the following health issues:  Weight Loss (Change of Meds)      Weight gain started with treating Iola Palsy with multiple treatments of steroids; BMI 28  - vegan lifestyle; meals consist of :  Breakfast: bran flakes; toast and butter   Pop: quit drinking diet pop; no juice;   Protein shakes 1-2 times a week;   usually do not eat at work; Not feeling hungry throughout the day; + night time snack       History of Present Illness       Reason for visit:  " Weight gain and pre diabetes.  Want to try ozempec or wegovey    She eats 0-1 servings of fruits and vegetables daily.She consumes 0 sweetened beverage(s) daily.She exercises with enough effort to increase her heart rate 9 or less minutes per day.  She exercises with enough effort to increase her heart rate 3 or less days per week.   She is taking medications regularly.    Today's PHQ-9         PHQ-9 Total Score: 2    PHQ-9 Q9 Thoughts of better off dead/self-harm past 2 weeks :   Not at all    How difficult have these problems made it for you to do your work, take care of things at home, or get along with other people: Somewhat difficult             Review of Systems   Constitutional, HEENT, cardiovascular, pulmonary, gi and gu systems are negative, except as otherwise noted.      Objective         Vitals:  No vitals were obtained today due to virtual visit.    Physical Exam   GENERAL: Healthy, alert and no distress  EYES: Eyes grossly normal to inspection.  No discharge or erythema, or obvious scleral/conjunctival abnormalities.  RESP: No audible wheeze, cough, or visible cyanosis.  No visible retractions or increased work of breathing.    SKIN: Visible skin clear. No significant rash, abnormal pigmentation or lesions.  NEURO: Cranial nerves grossly intact.  Mentation and speech appropriate for age.  PSYCH: Mentation appears normal, affect normal/bright, judgement and insight intact, normal speech and appearance well-groomed.    No results found for any visits on 01/31/23.            Video-Visit Details    Type of service:  Video Visit     Joined the call at 1/31/2023, 3:31:14 pm.  Left the call at 1/31/2023, 3:53:43 pm.  You were on the call for 22 minutes 28 seconds .    Originating Location (pt. Location): Home  Distant Location (provider location):  On-site  Platform used for Video Visit: [email or web address]

## 2023-01-31 NOTE — TELEPHONE ENCOUNTER
Prior Authorization Retail Medication Request    Medication/Dose:wegovy  ICD code (if different than what is on RX):    Previously Tried and Failed:    Rationale:      Insurance Name: Mounjaro 2.5mg  Step Therapy: Hx of suboptimal response,  contraindication or intolerance to  Metformin required  PA Required call [phone]  Drug Requires Prior Authorization    Pharmacy Information (if different than what is on RX)  Name:  [facility]  Phone:  [phone]

## 2023-02-03 NOTE — TELEPHONE ENCOUNTER
PA Initiation    Medication: Tirzepatide (MOUNJARO) 2.5 MG/0.5ML pen - Initiated  Insurance Company: [employer] ([facility]) - Phone [phone] Fax [phone]  Pharmacy Filling the Rx: [facility] - [place], [place] - [id number]  Filling Pharmacy Phone: [phone]  Filling Pharmacy Fax: [phone]  Start Date: 2/3/2023

## 2023-02-08 NOTE — TELEPHONE ENCOUNTER
PRIOR AUTHORIZATION DENIED    Medication: Tirzepatide (MOUNJARO) 2.5 MG/0.5ML pen - Denied    Denial Date: 2/3/2023    Denial Rational:         Appeal Information:

## 2023-02-10 ENCOUNTER — MYC MEDICAL ADVICE (OUTPATIENT)
Dept: FAMILY MEDICINE | Facility: CLINIC | Age: 53
End: 2023-02-10
Payer: COMMERCIAL

## 2023-02-13 NOTE — TELEPHONE ENCOUNTER
Nacho-Please advise:  1. Per 1/31/23 telephone encounter, Mounjaro was denied    Thank you!  JOSUE rTanN, RN-BC  [facility]

## 2023-02-14 PROBLEM — I47.10 SUPRAVENTRICULAR TACHYCARDIA (H): Status: ACTIVE | Noted: 2023-02-14

## 2023-03-01 NOTE — TELEPHONE ENCOUNTER
Writer responded via [place].    JOSUE TranN, RN-BC  [facility]

## 2023-03-01 NOTE — TELEPHONE ENCOUNTER
Please notify patient Mounjaro was denied per insurance.  Recommend checking with insurance if coverage is available for Saxenda, Ozempic, Wegovy prior to prescription to avoid delay.    Thank you,  SW

## 2023-03-11 DIAGNOSIS — F32.0 MILD MAJOR DEPRESSION (H): ICD-10-CM

## 2023-03-11 DIAGNOSIS — F41.1 ANXIETY STATE: ICD-10-CM

## 2023-03-15 RX ORDER — BUPROPION HYDROCHLORIDE 150 MG/1
TABLET ORAL
Qty: 90 TABLET | Refills: 3 | OUTPATIENT
Start: 2023-03-15

## 2023-03-15 NOTE — TELEPHONE ENCOUNTER
Message sent to pharmacy - Refusal reason: OTHER (ORDERS AT [facility] [phone]. PT CAN MOVE THE ORDER.).  Jeri SCHMIDT      PHQ 4/28/2021 5/24/2022 1/31/2023   PHQ-9 Total Score 2 4 2   Q9: Thoughts of better off dead/self-harm past 2 weeks Not at all Not at all Not at all

## 2023-04-09 ENCOUNTER — HEALTH MAINTENANCE LETTER (OUTPATIENT)
Age: 53
End: 2023-04-09

## 2023-05-14 DIAGNOSIS — E03.9 ACQUIRED HYPOTHYROIDISM: ICD-10-CM

## 2023-05-14 RX ORDER — LEVOTHYROXINE SODIUM 50 UG/1
TABLET ORAL
Qty: 90 TABLET | Refills: 1 | Status: SHIPPED | OUTPATIENT
Start: 2023-05-14 | End: 2024-07-25

## 2023-06-12 DIAGNOSIS — F41.1 ANXIETY STATE: ICD-10-CM

## 2023-06-12 DIAGNOSIS — F32.0 MILD MAJOR DEPRESSION (H): ICD-10-CM

## 2023-06-15 RX ORDER — PAROXETINE 20 MG/1
TABLET, FILM COATED ORAL
Qty: 90 TABLET | Refills: 0 | Status: SHIPPED | OUTPATIENT
Start: 2023-06-15 | End: 2024-04-01

## 2023-06-15 NOTE — TELEPHONE ENCOUNTER
---Prescription approved per [facility] Refill Protocol.       Delmy Winkler RN BSN     [facility]                  --Last visit:  1/31/2023 virtual with Marco.        4/28/2021     3:31 PM 5/24/2022     7:18 PM 1/31/2023    11:11 AM   PHQ   PHQ-9 Total Score 2 4 2   Q9: Thoughts of better off dead/self-harm past 2 weeks Not at all Not at all Not at all       Warnings Override History for PARoxetine (PAXIL) 20 MG tablet [[id number]]    Overridden by Nacho Lara APRN CNP on May 24, 2022 9:09 PM   Drug-Drug   1. IBUPROFEN / SELECTIVE SEROTONIN REUPTAKE INHIBITORS [Level: Major] [Reason: Will monitor drug levels/drug effects ]   Other Orders: ibuprofen (ADVIL,MOTRIN) 800 MG tablet

## 2023-06-21 ENCOUNTER — MYC MEDICAL ADVICE (OUTPATIENT)
Dept: FAMILY MEDICINE | Facility: CLINIC | Age: 53
End: 2023-06-21
Payer: COMMERCIAL

## 2023-06-21 DIAGNOSIS — F41.1 ANXIETY STATE: ICD-10-CM

## 2023-06-21 DIAGNOSIS — F32.0 MILD MAJOR DEPRESSION (H): ICD-10-CM

## 2023-06-23 RX ORDER — BUPROPION HYDROCHLORIDE 150 MG/1
150 TABLET ORAL EVERY MORNING
Qty: 90 TABLET | Refills: 1 | Status: SHIPPED | OUTPATIENT
Start: 2023-06-23 | End: 2024-02-20

## 2023-06-23 NOTE — TELEPHONE ENCOUNTER
Remaining refills re-sent.  [employer] message sent to patient.   Sharifa JOHNSON RN  [facility]

## 2023-10-01 ENCOUNTER — OFFICE VISIT (OUTPATIENT)
Dept: URGENT CARE | Facility: URGENT CARE | Age: 53
End: 2023-10-01
Payer: COMMERCIAL

## 2023-10-01 ENCOUNTER — ANCILLARY PROCEDURE (OUTPATIENT)
Dept: GENERAL RADIOLOGY | Facility: CLINIC | Age: 53
End: 2023-10-01
Attending: PHYSICIAN ASSISTANT
Payer: COMMERCIAL

## 2023-10-01 VITALS
HEIGHT: 69 IN | BODY MASS INDEX: 24.14 KG/M2 | WEIGHT: 163 LBS | TEMPERATURE: 99.2 F | OXYGEN SATURATION: 100 % | SYSTOLIC BLOOD PRESSURE: 105 MMHG | HEART RATE: 67 BPM | DIASTOLIC BLOOD PRESSURE: 78 MMHG

## 2023-10-01 DIAGNOSIS — E03.9 HYPOTHYROIDISM, UNSPECIFIED TYPE: ICD-10-CM

## 2023-10-01 DIAGNOSIS — R07.0 THROAT PAIN: Primary | ICD-10-CM

## 2023-10-01 DIAGNOSIS — R05.1 ACUTE COUGH: ICD-10-CM

## 2023-10-01 LAB
BASOPHILS # BLD AUTO: 0 10E3/UL (ref 0–0.2)
BASOPHILS NFR BLD AUTO: 1 %
DEPRECATED S PYO AG THROAT QL EIA: NEGATIVE
EOSINOPHIL # BLD AUTO: 0 10E3/UL (ref 0–0.7)
EOSINOPHIL NFR BLD AUTO: 0 %
ERYTHROCYTE [DISTWIDTH] IN BLOOD BY AUTOMATED COUNT: 13.2 % (ref 10–15)
HCT VFR BLD AUTO: 42.8 % (ref 35–47)
HGB BLD-MCNC: 14 G/DL (ref 11.7–15.7)
IMM GRANULOCYTES # BLD: 0 10E3/UL
IMM GRANULOCYTES NFR BLD: 0 %
LYMPHOCYTES # BLD AUTO: 1.4 10E3/UL (ref 0.8–5.3)
LYMPHOCYTES NFR BLD AUTO: 43 %
MCH RBC QN AUTO: 27.7 PG (ref 26.5–33)
MCHC RBC AUTO-ENTMCNC: 32.7 G/DL (ref 31.5–36.5)
MCV RBC AUTO: 85 FL (ref 78–100)
MONOCYTES # BLD AUTO: 0.4 10E3/UL (ref 0–1.3)
MONOCYTES NFR BLD AUTO: 12 %
NEUTROPHILS # BLD AUTO: 1.4 10E3/UL (ref 1.6–8.3)
NEUTROPHILS NFR BLD AUTO: 44 %
PLATELET # BLD AUTO: 218 10E3/UL (ref 150–450)
RBC # BLD AUTO: 5.06 10E6/UL (ref 3.8–5.2)
WBC # BLD AUTO: 3.3 10E3/UL (ref 4–11)

## 2023-10-01 PROCEDURE — 85025 COMPLETE CBC W/AUTO DIFF WBC: CPT | Performed by: PHYSICIAN ASSISTANT

## 2023-10-01 PROCEDURE — 84443 ASSAY THYROID STIM HORMONE: CPT | Performed by: PHYSICIAN ASSISTANT

## 2023-10-01 PROCEDURE — 71046 X-RAY EXAM CHEST 2 VIEWS: CPT | Mod: TC | Performed by: RADIOLOGY

## 2023-10-01 PROCEDURE — 87635 SARS-COV-2 COVID-19 AMP PRB: CPT | Performed by: PHYSICIAN ASSISTANT

## 2023-10-01 PROCEDURE — 87651 STREP A DNA AMP PROBE: CPT | Performed by: PHYSICIAN ASSISTANT

## 2023-10-01 PROCEDURE — 84439 ASSAY OF FREE THYROXINE: CPT | Performed by: PHYSICIAN ASSISTANT

## 2023-10-01 PROCEDURE — 36415 COLL VENOUS BLD VENIPUNCTURE: CPT | Performed by: PHYSICIAN ASSISTANT

## 2023-10-01 PROCEDURE — 99213 OFFICE O/P EST LOW 20 MIN: CPT | Performed by: PHYSICIAN ASSISTANT

## 2023-10-01 RX ORDER — AZITHROMYCIN 250 MG/1
TABLET, FILM COATED ORAL
Qty: 6 TABLET | Refills: 0 | Status: SHIPPED | OUTPATIENT
Start: 2023-10-01 | End: 2024-07-25

## 2023-10-01 NOTE — PROGRESS NOTES
Assessment & Plan     1. Throat pain  - Streptococcus A Rapid Screen w/Reflex to PCR - Clinic Collect  - Symptomatic COVID-19 Virus (Coronavirus) by PCR Nose  - Group A Streptococcus PCR Throat Swab    2. Acute cough  Suspect that symptoms are viral in nature, CXR negative. Leukopenia noted on CBC  Given the severity, discussed that if symptoms are not improving as expected OK to start azithromycin  Supportive cares encouraged, fluids and rest   - CBC with platelets and differential; Future  - XR Chest 2 Views; Future  - CBC with platelets and differential  - azithromycin (ZITHROMAX) 250 MG tablet; Two tablets first day, then one tablet daily for four days.  Dispense: 6 tablet; Refill: 0    3. Hypothyroidism, unspecified type  Labs released from PCP  - TSH  - T4 free          No follow-ups on file.    Diagnosis and treatment plan was reviewed with patient and/or family.   We went over any labs or imaging. Discussed worsening symptoms or little to no relief despite treatment plan to follow-up with PCP or return to clinic.  Patient verbalizes understanding. All questions were addressed and answered.     Dolores Leblanc PA-C  [facility] [place]    CHIEF COMPLAINT:   Chief Complaint   Patient presents with    Urgent Care    Headache    Respiratory Problems    Throat Pain     Pt in clinic to have eval for sore throat, cough, chest discomfort, fatigue, headache and SOB for 5 days.     Carole Hart is a 53 year old female who presents to clinic today for evaluation of sore throat, cough, fever and fatigue.  Symptoms started 5 to 6 days ago.  Initially she had high-grade fever up to 103, which has mainly resolved, although will be present at night.  Cough is nonproductive, and will develop some substernal chest pain when she coughs.  No hemoptysis, chest pain or shortness of breath.  Endorses feeling very fatigued and drained.      Past Medical History:   Diagnosis Date    Anxiety state,  unspecified     Anxiety    Cardiac dysrhythmia, unspecified     Arrhythmia NOS s/p ablation    Chronic peptic ulcer, unspecified site, without mention of hemorrhage, perforation, or obstruction     Ulcer, Peptic    Depressive disorder, not elsewhere classified     Depression (non-psychotic)    Leukorrhea, not specified as infective 11/18/2009    heavy, daily, yellow/green mucoid dischg following retained tampon removal.Tx w flagyl, metrogel, Cleocin, Diflucan, doxycycline, boric acid capsules. 5/16/2011 + GBS.     Menarche age 13    cycles q 30 x 5 d, heavy    Moderate dysplasia of cervix (KENNY II) 1998     Normal Papssince LEEP->routine screening     Past Surgical History:   Procedure Laterality Date    CARDIAC SURGERY  2000    Catheter ablation    Cervical Conization Loop Electrode Excision  1998    KENNY II  F/U Pap q 3 mo x 2 yrs were all NORMAL    COSMETIC SURGERY  2005& 2006    Liposuction    EYE SURGERY  2006    Lasix    KIDNEY SURGERY  summer 2012    6 kidney stone excision/stent placed    LASIK      ORTHOPEDIC SURGERY  2012    Bunionectomy    SURGICAL HISTORY OF -       lasik    SURGICAL HISTORY OF -       catheter ablation heart atrial fib tx    VASCULAR SURGERY  Feb. 2020    Adhesive ablation     Social History     Tobacco Use    Smoking status: Never    Smokeless tobacco: Never   Substance Use Topics    Alcohol use: Not Currently     Comment: 1-2 beers 1-2Xs/mo     Current Outpatient Medications   Medication    buPROPion (WELLBUTRIN XL) 150 MG 24 hr tablet    diltiazem (CARDIZEM) 30 MG tablet    diltiazem ER COATED BEADS (CARDIZEM CD/CARTIA XT) 120 MG 24 hr capsule    levothyroxine (SYNTHROID/LEVOTHROID) 50 MCG tablet    PARoxetine (PAXIL) 20 MG tablet    albuterol (PROAIR HFA/PROVENTIL HFA/VENTOLIN HFA) 108 (90 Base) MCG/ACT inhaler    benzonatate (TESSALON) 200 MG capsule    fluticasone-salmeterol (ADVAIR) 250-50 MCG/DOSE inhaler    ibuprofen (ADVIL,MOTRIN) 800 MG tablet    Pilocarpine HCl 1.25 % SOLN  "    No current facility-administered medications for this visit.     Allergies   Allergen Reactions    Meloxicam      Tongue swelling       10 point ROS of systems were all negative except for pertinent positives noted in my HPI.      Exam:   /78   Pulse 67   Temp 99.2  F (37.3  C) (Temporal)   Ht 1.753 m (5' 9\")   Wt 73.9 kg (163 lb)   LMP 09/15/2023   SpO2 100%   BMI 24.07 kg/m    Constitutional: alert and no distress  Head: Normocephalic, atraumatic.  ENT: TMs clear and shiny ashley, nasal mucosa pink and moist, throat erythematous, without trismus, stridor or drooling.  Neck: neck is supple, no cervical lymphadenopathy or nuchal rigidity  Cardiovascular: RRR  Respiratory: CTA bilaterally, no rhonchi or rales  Skin: no rashes  Neurologic: Speech clear, gait normal. Moves all extremities.    Results for orders placed or performed in visit on 10/01/23   XR Chest 2 Views     Status: None    Narrative    EXAM: XR CHEST 2 VIEWS  LOCATION: [facility]  DATE: 10/1/2023    INDICATION: dry cough, chest discomfort  COMPARISON: 04/12/2022      Impression    IMPRESSION: Negative chest.   Results for orders placed or performed in visit on 10/01/23   CBC with platelets and differential     Status: Abnormal   Result Value Ref Range    WBC Count 3.3 (L) 4.0 - 11.0 10e3/uL    RBC Count 5.06 3.80 - 5.20 10e6/uL    Hemoglobin 14.0 11.7 - 15.7 g/dL    Hematocrit 42.8 35.0 - 47.0 %    MCV 85 78 - 100 fL    MCH 27.7 26.5 - 33.0 pg    MCHC 32.7 31.5 - 36.5 g/dL    RDW 13.2 10.0 - 15.0 %    Platelet Count 218 150 - 450 10e3/uL    % Neutrophils 44 %    % Lymphocytes 43 %    % Monocytes 12 %    % Eosinophils 0 %    % Basophils 1 %    % Immature Granulocytes 0 %    Absolute Neutrophils 1.4 (L) 1.6 - 8.3 10e3/uL    Absolute Lymphocytes 1.4 0.8 - 5.3 10e3/uL    Absolute Monocytes 0.4 0.0 - 1.3 10e3/uL    Absolute Eosinophils 0.0 0.0 - 0.7 10e3/uL    Absolute Basophils 0.0 0.0 - 0.2 10e3/uL    Absolute " Immature Granulocytes 0.0 <=0.4 10e3/uL   Streptococcus A Rapid Screen w/Reflex to PCR - Clinic Collect     Status: Normal    Specimen: Throat; Swab   Result Value Ref Range    Group A Strep antigen Negative Negative   CBC with platelets and differential     Status: Abnormal    Narrative    The following orders were created for panel order CBC with platelets and differential.  Procedure                               Abnormality         Status                     ---------                               -----------         ------                     CBC with platelets and d...[[id number]]  Abnormal            Final result                 Please view results for these tests on the individual orders.

## 2023-10-01 NOTE — LETTER
October 1, 2023      Hailey Stark  [address] [address]        To Whom It May Concern:    Hailey Stark was seen in our clinic. Please excuse from work 9/30/23 - 10/2/23.       Sincerely,        Dolores Leblanc PA-C

## 2023-10-02 LAB
GROUP A STREP BY PCR: NOT DETECTED
SARS-COV-2 RNA RESP QL NAA+PROBE: NEGATIVE
T4 FREE SERPL-MCNC: 1.12 NG/DL (ref 0.9–1.7)
TSH SERPL DL<=0.005 MIU/L-ACNC: 3.19 UIU/ML (ref 0.3–4.2)

## 2023-10-05 ENCOUNTER — TELEPHONE (OUTPATIENT)
Dept: PULMONOLOGY | Facility: CLINIC | Age: 53
End: 2023-10-05
Payer: COMMERCIAL

## 2023-10-05 NOTE — TELEPHONE ENCOUNTER
"Niecy Paula's office from [employer] was contacted in regards to a fax we received on 09/28/23 for this patient's workers comp. The fax stated they needed an opinion from Dr Keller following the ANA report of Dr Cronin who denied her diagnosis of long haul COVID back in July 2022. Writer told the office that looking at the patient's chart, it seems like Dr Encinas has already reviewed this case and stated in August 2022 that \"She [Hailey] does not have any post-COVID pulmonary diagnosis\" (per Naresh's telephone encounter).     Office was confused as they had never heard of this opinion from the clinic until now. Patient's trial for the case is in Nov 5, 2023 so they need a written statement from the provider to present at the court. To clear up the situation, office will resend Dipti the packet they supposedly sent in July 2022, and clinic will try to get a written statement from Dr Encinas regarding his opinion on the matter to present during the trial.      Number for [email or web address] # [phone]  "

## 2023-10-25 ENCOUNTER — MYC MEDICAL ADVICE (OUTPATIENT)
Dept: FAMILY MEDICINE | Facility: CLINIC | Age: 53
End: 2023-10-25
Payer: COMMERCIAL

## 2023-10-27 NOTE — TELEPHONE ENCOUNTER
Nacho,    Patient requests referral to dermatology due to areas of concern and history of unprotected sun exposure.    Order pended for your review.    Danni Melendrez RN, BSN, PHN  [facility]  [phone]

## 2023-10-30 ENCOUNTER — MYC MEDICAL ADVICE (OUTPATIENT)
Dept: FAMILY MEDICINE | Facility: CLINIC | Age: 53
End: 2023-10-30
Payer: COMMERCIAL

## 2023-10-30 DIAGNOSIS — Z12.83 ENCOUNTER FOR SCREENING FOR MALIGNANT NEOPLASM OF SKIN: Primary | ICD-10-CM

## 2023-11-02 ENCOUNTER — OFFICE VISIT (OUTPATIENT)
Dept: DERMATOLOGY | Facility: CLINIC | Age: 53
End: 2023-11-02
Payer: COMMERCIAL

## 2023-11-02 DIAGNOSIS — L82.1 SEBORRHEIC KERATOSES: Primary | ICD-10-CM

## 2023-11-02 DIAGNOSIS — D18.01 ANGIOMA OF SKIN: ICD-10-CM

## 2023-11-02 DIAGNOSIS — D23.9 DERMAL NEVUS: ICD-10-CM

## 2023-11-02 DIAGNOSIS — L81.4 LENTIGO: ICD-10-CM

## 2023-11-02 PROCEDURE — 17110 DESTRUCTION B9 LES UP TO 14: CPT | Performed by: DERMATOLOGY

## 2023-11-02 PROCEDURE — 99203 OFFICE O/P NEW LOW 30 MIN: CPT | Mod: 25 | Performed by: DERMATOLOGY

## 2023-11-02 ASSESSMENT — PAIN SCALES - GENERAL: PAINLEVEL: NO PAIN (0)

## 2023-11-02 NOTE — PROGRESS NOTES
Hailey Stark , a 53 year old year old female patient, I was asked to see by VENTURA Lara for spots on skin. She notes bleeding spot on left upper lid.    Patient has no other skin complaints today.  Remainder of the HPI, Meds, PMH, Allergies, FH, and SH was reviewed in chart.      Past Medical History:   Diagnosis Date    Anxiety state, unspecified     Anxiety    Cardiac dysrhythmia, unspecified     Arrhythmia NOS s/p ablation    Chronic peptic ulcer, unspecified site, without mention of hemorrhage, perforation, or obstruction     Ulcer, Peptic    Depressive disorder, not elsewhere classified     Depression (non-psychotic)    Leukorrhea, not specified as infective 11/18/2009    heavy, daily, yellow/green mucoid dischg following retained tampon removal.Tx w flagyl, metrogel, Cleocin, Diflucan, doxycycline, boric acid capsules. 5/16/2011 + GBS.     Menarche age 13    cycles q 30 x 5 d, heavy    Moderate dysplasia of cervix (KENNY II) 1998     Normal Papssince LEEP->routine screening       Past Surgical History:   Procedure Laterality Date    CARDIAC SURGERY  2000    Catheter ablation    Cervical Conization Loop Electrode Excision  1998    KENNY II  F/U Pap q 3 mo x 2 yrs were all NORMAL    COSMETIC SURGERY  2005& 2006    Liposuction    EYE SURGERY  2006    Lasix    KIDNEY SURGERY  summer 2012    6 kidney stone excision/stent placed    LASIK      ORTHOPEDIC SURGERY  2012    Bunionectomy    SURGICAL HISTORY OF -       lasik    SURGICAL HISTORY OF -       catheter ablation heart atrial fib tx    VASCULAR SURGERY  Feb. 2020    Adhesive ablation        Family History   Problem Relation Age of Onset    Thyroid Disease Mother         removed    Cancer Paternal Grandmother         stomach cancer    Alzheimer Disease Paternal Grandmother     Depression Sister     Thyroid Disease Sister     Thyroid Disease Sister         on meds    Diabetes Brother     Depression Brother     Diabetes Nephew     Glaucoma No family hx of     Macular  Degeneration No family hx of        Social History     Socioeconomic History    Marital status: Single     Spouse name: Not on file    Number of children: Not on file    Years of education: Not on file    Highest education level: Not on file   Occupational History    Not on file   Tobacco Use    Smoking status: Never    Smokeless tobacco: Never   Vaping Use    Vaping Use: Never used   Substance and Sexual Activity    Alcohol use: Not Currently     Comment: 1-2 beers 1-2Xs/mo    Drug use: No    Sexual activity: Yes     Partners: Male     Birth control/protection: None   Other Topics Concern    Parent/sibling w/ CABG, MI or angioplasty before 65F 55M? 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     Caffeine 0 servings/d    Exercise 4 x week    Sunscreen used - Yes    Seatbelts used - Yes    Working smoke/CO detectors in the home - Yes    Guns stored in the home - No    Self Breast Exams - No    Self Testicular Exam - NA    Eye Exam up to date - Yes    Dental Exam up to date - No    Pap Smear up to date - Yes    Mammogram up to date - NA    PSA up to date - NA    Dexa Scan up to date - NA    Flex Sig / Colonoscopy up to date - Yes    Immunizations up to date - Yes    Abuse: Current or Past(Physical, Sexual or Emotional)- No    Do you feel safe in your environment - Yes         Social Determinants of Health     Financial Resource Strain: Not on file   Food  Insecurity: Not on file   Transportation Needs: Not on file   Physical Activity: Not on file   Stress: Not on file   Social Connections: Not on file   Interpersonal Safety: Not on file   Housing Stability: Not on file       Outpatient Encounter Medications as of 11/2/2023   Medication Sig Dispense Refill    albuterol (PROAIR HFA/PROVENTIL HFA/VENTOLIN HFA) 108 (90 Base) MCG/ACT inhaler Inhale 2 puffs into the lungs every 6 hours (Patient not taking: Reported on 10/1/2023) 18 g 1    azithromycin (ZITHROMAX) 250 MG tablet Two tablets first day, then one tablet daily for four days. 6 tablet 0    buPROPion (WELLBUTRIN XL) 150 MG 24 hr tablet Take 1 tablet (150 mg) by mouth every morning 90 tablet 1    diltiazem (CARDIZEM) 30 MG tablet Take 1 tablet (30 mg) by mouth as needed (at onset of tachycardia) 15 tablet 0    diltiazem ER COATED BEADS (CARDIZEM CD/CARTIA XT) 120 MG 24 hr capsule Take 1 capsule (120 mg) by mouth daily 90 capsule 3    fluticasone-salmeterol (ADVAIR) 250-50 MCG/DOSE inhaler Inhale 1 puff into the lungs every 12 hours (Patient not taking: Reported on 10/1/2023) 2 each 1    ibuprofen (ADVIL,MOTRIN) 800 MG tablet Take 1 tablet (800 mg) by mouth every 8 hours as needed for moderate pain (Patient not taking: Reported on 10/1/2023) 30 tablet 0    levothyroxine (SYNTHROID/LEVOTHROID) 50 MCG tablet TAKE 1 TABLET(50 MCG) BY MOUTH DAILY 90 tablet 1    PARoxetine (PAXIL) 20 MG tablet TAKE 1 TABLET(20 MG) BY MOUTH EVERY MORNING 90 tablet 0    Pilocarpine HCl 1.25 % SOLN Apply 1 drop to eye every morning (Patient not taking: Reported on 10/1/2023) 2.5 mL 0     No facility-administered encounter medications on file as of 11/2/2023.             Review Of Systems  Skin: As above  Eyes: negative  Ears/Nose/Throat: negative  Respiratory: No shortness of breath, dyspnea on exertion, cough, or hemoptysis  Cardiovascular: negative  Gastrointestinal: negative  Genitourinary: negative  Musculoskeletal:  negative  Neurologic: negative  Psychiatric: negative  Hematologic/Lymphatic/Immunologic: negative  Endocrine: negative      O:   NAD, WDWN, Alert & Oriented, Mood & Affect wnl, Vitals stable   General appearance du ii   Vitals stable    Alert, oriented and in no acute distress     L upper lid red papule  Stuck on papules and brown macules on trunk and ext    Red papules on trunk   Flesh colored papules on trunk      The remainder of the full exam was normal; the following areas were examined:  conjunctiva/lids, , neck, peripheral vascular system, abdomen, lymph nodes, digits/nails, eccrine and apocrine glands, scalp/hair, face, neck, chest, abdomen, buttocks, back, RUE, LUE, RLE, LLE       Eyes: Conjunctivae/lids:Normal     ENT: Lips, buccal mucosa, tongue: normal    MSK:Normal    Cardiovascular: peripheral edema none    Pulm: Breathing Normal    Lymph Nodes: No Head and Neck Lymphadenopathy     Neuro/Psych: Orientation:Normal; Mood/Affect:Normal      A/P:  1. Seborrheic keratosis, lentigo, angioma, dermal nevus  Cautery for lid lesions discussed with patient   Recurrence discussed with patient   Cautery :  After consent, anesthesia with LE and prep, cautery performed.  No complications and routine wound care.    It was a pleasure speaking to Hailey Stark today.  Previous clinic  notes and pertinent laboratory tests were reviewed prior to Hailey Stark's visit.  Nature and genetics of benign skin lesions dicussed with patient.  Signs and Symptoms of skin cancer discussed with patient.  Patient encouraged to perform monthly skin exams.  UV precautions reviewed with patient.  Risks of non-melanoma skin cancer discussed with patient   Return to clinic 12 months

## 2023-11-02 NOTE — LETTER
11/2/2023         RE: Hailey Stark  [address]  [facility] [address]        Dear Colleague,    Thank you for referring your patient, Hailey Stark, to the [facility]. Please see a copy of my visit note below.    Hailey Stark , a 53 year old year old female patient, I was asked to see by VENTURA Lara for spots on skin. She notes bleeding spot on left upper lid.    Patient has no other skin complaints today.  Remainder of the HPI, Meds, PMH, Allergies, FH, and SH was reviewed in chart.      Past Medical History:   Diagnosis Date     Anxiety state, unspecified     Anxiety     Cardiac dysrhythmia, unspecified     Arrhythmia NOS s/p ablation     Chronic peptic ulcer, unspecified site, without mention of hemorrhage, perforation, or obstruction     Ulcer, Peptic     Depressive disorder, not elsewhere classified     Depression (non-psychotic)     Leukorrhea, not specified as infective 11/18/2009    heavy, daily, yellow/green mucoid dischg following retained tampon removal.Tx w flagyl, metrogel, Cleocin, Diflucan, doxycycline, boric acid capsules. 5/16/2011 + GBS.      Menarche age 13    cycles q 30 x 5 d, heavy     Moderate dysplasia of cervix (KENNY II) 1998     Normal Papssince LEEP->routine screening       Past Surgical History:   Procedure Laterality Date     CARDIAC SURGERY  2000    Catheter ablation     Cervical Conization Loop Electrode Excision  1998    KENNY II  F/U Pap q 3 mo x 2 yrs were all NORMAL     COSMETIC SURGERY  2005& 2006    Liposuction     EYE SURGERY  2006    Lasix     KIDNEY SURGERY  summer 2012    6 kidney stone excision/stent placed     LASIK       ORTHOPEDIC SURGERY  2012    Bunionectomy     SURGICAL HISTORY OF -       lasik     SURGICAL HISTORY OF -       catheter ablation heart atrial fib tx     VASCULAR SURGERY  Feb. 2020    Adhesive ablation        Family History   Problem Relation Age of Onset     Thyroid Disease Mother         removed     Cancer  Paternal Grandmother         stomach cancer     Alzheimer Disease Paternal Grandmother      Depression Sister      Thyroid Disease Sister      Thyroid Disease Sister         on meds     Diabetes Brother      Depression Brother      Diabetes Nephew      Glaucoma No family hx of      Macular Degeneration No family hx of        Social History     Socioeconomic History     Marital status: Single     Spouse name: Not on file     Number of children: Not on file     Years of education: Not on file     Highest education level: Not on file   Occupational History     Not on file   Tobacco Use     Smoking status: Never     Smokeless tobacco: Never   Vaping Use     Vaping Use: Never used   Substance and Sexual Activity     Alcohol use: Not Currently     Comment: 1-2 beers 1-2Xs/mo     Drug use: No     Sexual activity: Yes     Partners: Male     Birth control/protection: None   Other Topics Concern     Parent/sibling w/ CABG, MI or angioplasty before 65F 55M? 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     Caffeine 0 servings/d    Exercise 4 x week    Sunscreen used - Yes    Seatbelts used - Yes    Working smoke/CO detectors in the home - Yes    Guns stored in the home - No    Self Breast Exams - No    Self Testicular Exam - NA    Eye Exam up to date - Yes    Dental Exam up to date - No    Pap Smear up to date - Yes    Mammogram up to date - NA    PSA  up to date - NA    Dexa Scan up to date - NA    Flex Sig / Colonoscopy up to date - Yes    Immunizations up to date - Yes    Abuse: Current or Past(Physical, Sexual or Emotional)- No    Do you feel safe in your environment - Yes         Social Determinants of Health     Financial Resource Strain: Not on file   Food Insecurity: Not on file   Transportation Needs: Not on file   Physical Activity: Not on file   Stress: Not on file   Social Connections: Not on file   Interpersonal Safety: Not on file   Housing Stability: Not on file       Outpatient Encounter Medications as of 11/2/2023   Medication Sig Dispense Refill     albuterol (PROAIR HFA/PROVENTIL HFA/VENTOLIN HFA) 108 (90 Base) MCG/ACT inhaler Inhale 2 puffs into the lungs every 6 hours (Patient not taking: Reported on 10/1/2023) 18 g 1     azithromycin (ZITHROMAX) 250 MG tablet Two tablets first day, then one tablet daily for four days. 6 tablet 0     buPROPion (WELLBUTRIN XL) 150 MG 24 hr tablet Take 1 tablet (150 mg) by mouth every morning 90 tablet 1     diltiazem (CARDIZEM) 30 MG tablet Take 1 tablet (30 mg) by mouth as needed (at onset of tachycardia) 15 tablet 0     diltiazem ER COATED BEADS (CARDIZEM CD/CARTIA XT) 120 MG 24 hr capsule Take 1 capsule (120 mg) by mouth daily 90 capsule 3     fluticasone-salmeterol (ADVAIR) 250-50 MCG/DOSE inhaler Inhale 1 puff into the lungs every 12 hours (Patient not taking: Reported on 10/1/2023) 2 each 1     ibuprofen (ADVIL,MOTRIN) 800 MG tablet Take 1 tablet (800 mg) by mouth every 8 hours as needed for moderate pain (Patient not taking: Reported on 10/1/2023) 30 tablet 0     levothyroxine (SYNTHROID/LEVOTHROID) 50 MCG tablet TAKE 1 TABLET(50 MCG) BY MOUTH DAILY 90 tablet 1     PARoxetine (PAXIL) 20 MG tablet TAKE 1 TABLET(20 MG) BY MOUTH EVERY MORNING 90 tablet 0     Pilocarpine HCl 1.25 % SOLN Apply 1 drop to eye every morning (Patient not taking: Reported on 10/1/2023) 2.5 mL 0     No facility-administered  encounter medications on file as of 11/2/2023.             Review Of Systems  Skin: As above  Eyes: negative  Ears/Nose/Throat: negative  Respiratory: No shortness of breath, dyspnea on exertion, cough, or hemoptysis  Cardiovascular: negative  Gastrointestinal: negative  Genitourinary: negative  Musculoskeletal: negative  Neurologic: negative  Psychiatric: negative  Hematologic/Lymphatic/Immunologic: negative  Endocrine: negative      O:   NAD, WDWN, Alert & Oriented, Mood & Affect wnl, Vitals stable   General appearance du ii   Vitals stable    Alert, oriented and in no acute distress     L upper lid red papule  Stuck on papules and brown macules on trunk and ext    Red papules on trunk   Flesh colored papules on trunk      The remainder of the full exam was normal; the following areas were examined:  conjunctiva/lids, , neck, peripheral vascular system, abdomen, lymph nodes, digits/nails, eccrine and apocrine glands, scalp/hair, face, neck, chest, abdomen, buttocks, back, RUE, LUE, RLE, LLE       Eyes: Conjunctivae/lids:Normal     ENT: Lips, buccal mucosa, tongue: normal    MSK:Normal    Cardiovascular: peripheral edema none    Pulm: Breathing Normal    Lymph Nodes: No Head and Neck Lymphadenopathy     Neuro/Psych: Orientation:Normal; Mood/Affect:Normal      A/P:  1. Seborrheic keratosis, lentigo, angioma, dermal nevus  Cautery for lid lesions discussed with patient   Recurrence discussed with patient   Cautery :  After consent, anesthesia with LE and prep, cautery performed.  No complications and routine wound care.    It was a pleasure speaking to Hailey Stark today.  Previous clinic  notes and pertinent laboratory tests were reviewed prior to Hailey Stark's visit.  Nature and genetics of benign skin lesions dicussed with patient.  Signs and Symptoms of skin cancer discussed with patient.  Patient encouraged to perform monthly skin exams.  UV precautions reviewed with patient.  Risks of non-melanoma skin  cancer discussed with patient   Return to clinic 12 months      Again, thank you for allowing me to participate in the care of your patient.        Sincerely,        Edmond Feng MD

## 2023-11-16 DIAGNOSIS — R00.2 PALPITATIONS: ICD-10-CM

## 2023-11-21 NOTE — TELEPHONE ENCOUNTER
diltiazem 120 MG      Last Written Prescription Date:  8/31/22  Last Fill Quantity: 90,   # refills: 3  Last Office Visit : 8/31/22  Future Office visit:  none  RTC  PRN  Routing refill request to provider for review/approval because: Return I PRN   Does card want to continue/RF med?

## 2023-11-28 RX ORDER — DILTIAZEM HYDROCHLORIDE 120 MG/1
120 CAPSULE, COATED, EXTENDED RELEASE ORAL DAILY
Qty: 90 CAPSULE | Refills: 0 | Status: SHIPPED | OUTPATIENT
Start: 2023-11-28 | End: 2024-07-25

## 2023-11-28 NOTE — TELEPHONE ENCOUNTER
Diltiazem 90 day ye given. Patient to follow-up prn with EP. MyChart message sent to patient that PCP will need to authorize additional refills.

## 2024-02-15 DIAGNOSIS — F32.0 MILD MAJOR DEPRESSION (H): ICD-10-CM

## 2024-02-15 DIAGNOSIS — F41.1 ANXIETY STATE: ICD-10-CM

## 2024-02-20 RX ORDER — BUPROPION HYDROCHLORIDE 150 MG/1
150 TABLET ORAL DAILY
Qty: 150 TABLET | Refills: 0 | Status: SHIPPED | OUTPATIENT
Start: 2024-02-20

## 2024-02-29 DIAGNOSIS — R00.2 PALPITATIONS: ICD-10-CM

## 2024-03-06 NOTE — TELEPHONE ENCOUNTER
diltiazem ER COATED BEADS (CARDIZEM CD/CARTIA XT) 120 MG 24 hr capsule   90 capsule 0 11/28/2023     Last Office Visit : 8-  Future Office visit:  none    Calcium Channel Blockers Protocol  [id number]/29/2024 03:37 AM   Protocol Details Normal ALT in past 12 months    Recent (12 mo) or future (30 days) visit within the authorizing provider's specialty    Normal serum creatinine on file in past 12 months     Lab Test 04/12/22  1847   ALT 20     Lab Test 12/09/22  1135   CR 0.95

## 2024-03-08 RX ORDER — DILTIAZEM HYDROCHLORIDE 120 MG/1
120 CAPSULE, COATED, EXTENDED RELEASE ORAL DAILY
Qty: 30 CAPSULE | OUTPATIENT
Start: 2024-03-08

## 2024-03-08 NOTE — TELEPHONE ENCOUNTER
Rx denied. Patient needs to request from PCP; no longer sees Dr Wade last appointment 8/31/22 to follow-up as needed.

## 2024-04-01 DIAGNOSIS — F32.0 MILD MAJOR DEPRESSION (H): ICD-10-CM

## 2024-04-01 DIAGNOSIS — F41.1 ANXIETY STATE: ICD-10-CM

## 2024-04-01 RX ORDER — PAROXETINE 20 MG/1
TABLET, FILM COATED ORAL
Qty: 90 TABLET | Refills: 0 | Status: SHIPPED | OUTPATIENT
Start: 2024-04-01 | End: 2024-07-25

## 2024-06-15 ENCOUNTER — HEALTH MAINTENANCE LETTER (OUTPATIENT)
Age: 54
End: 2024-06-15

## 2024-06-21 ENCOUNTER — MYC REFILL (OUTPATIENT)
Dept: CARDIOLOGY | Facility: CLINIC | Age: 54
End: 2024-06-21
Payer: COMMERCIAL

## 2024-06-21 DIAGNOSIS — R00.2 PALPITATIONS: ICD-10-CM

## 2024-06-25 NOTE — TELEPHONE ENCOUNTER
diltiazem ER COATED BEADS (CARDIZEM CD/CARTIA XT) 120 MG 24 hr capsule   Last Written Prescription Date:  11/28/2023  Last Fill Quantity: 90,   # refills: 0  Last Office Visit : 8/31/2022 [facility] [place]  Future Office visit:  none  Routing diltiazem ER COATED BEADS (CARDIZEM CD/CARTIA XT) 120 MG 24 hr capsule  refill request to provider for review/approval because: failed - required labs and appointment  - >18 months last Cardiology visit   - >18 months last ALT and GFR labs  - plan last visit 8/31/2022 f/u Cardiology PRN  - ye refill 90 day supply 11/28/2023    Liver Function Studies -   Recent Labs   Lab Test 12/09/22  1135 04/12/22  1847   PROTTOTAL  --  7.3   ALBUMIN 3.9 3.5   BILITOTAL  --  0.5   ALKPHOS  --  76   AST  --  22   ALT  --  20        GFR Estimate   Date Value Ref Range Status   12/09/2022 72 >60 mL/min/1.73m2 Final     Comment:     Effective December 21, 2021 eGFRcr in adults is calculated using the 2021 CKD-EPI creatinine equation which includes age and gender (Thiago et al., NEJM, DOI: 10.1056/CNKSou7175411)   03/19/2021 63 >60 mL/min/[1.73_m2] Final     Comment:     Non  GFR Calc  Starting 12/18/2018, serum creatinine based estimated GFR (eGFR) will be   calculated using the Chronic Kidney Disease Epidemiology Collaboration   (CKD-EPI) equation.

## 2024-06-28 RX ORDER — DILTIAZEM HYDROCHLORIDE 120 MG/1
120 CAPSULE, COATED, EXTENDED RELEASE ORAL DAILY
Qty: 30 CAPSULE | Refills: 0 | OUTPATIENT
Start: 2024-06-28

## 2024-06-29 ENCOUNTER — MYC REFILL (OUTPATIENT)
Dept: CARDIOLOGY | Facility: CLINIC | Age: 54
End: 2024-06-29
Payer: COMMERCIAL

## 2024-06-29 DIAGNOSIS — R00.2 PALPITATIONS: ICD-10-CM

## 2024-06-29 RX ORDER — DILTIAZEM HYDROCHLORIDE 120 MG/1
120 CAPSULE, COATED, EXTENDED RELEASE ORAL DAILY
Qty: 90 CAPSULE | Refills: 0 | Status: CANCELLED | OUTPATIENT
Start: 2024-06-29

## 2024-07-02 NOTE — TELEPHONE ENCOUNTER
diltiazem ER COATED BEADS: addressed in other encounter  - refused by Cardiology 6/28/2024 Patient no longer under provider care  .- message sent to patient at that time and has been read by patient

## 2024-07-21 SDOH — HEALTH STABILITY: PHYSICAL HEALTH: ON AVERAGE, HOW MANY DAYS PER WEEK DO YOU ENGAGE IN MODERATE TO STRENUOUS EXERCISE (LIKE A BRISK WALK)?: 0 DAYS

## 2024-07-21 ASSESSMENT — ASTHMA QUESTIONNAIRES
QUESTION_2 LAST FOUR WEEKS HOW OFTEN HAVE YOU HAD SHORTNESS OF BREATH: ONCE A DAY
QUESTION_3 LAST FOUR WEEKS HOW OFTEN DID YOUR ASTHMA SYMPTOMS (WHEEZING, COUGHING, SHORTNESS OF BREATH, CHEST TIGHTNESS OR PAIN) WAKE YOU UP AT NIGHT OR EARLIER THAN USUAL IN THE MORNING: NOT AT ALL
QUESTION_5 LAST FOUR WEEKS HOW WOULD YOU RATE YOUR ASTHMA CONTROL: WELL CONTROLLED
ACT_TOTALSCORE: 20
ACT_TOTALSCORE: 20
QUESTION_1 LAST FOUR WEEKS HOW MUCH OF THE TIME DID YOUR ASTHMA KEEP YOU FROM GETTING AS MUCH DONE AT WORK, SCHOOL OR AT HOME: NONE OF THE TIME
QUESTION_4 LAST FOUR WEEKS HOW OFTEN HAVE YOU USED YOUR RESCUE INHALER OR NEBULIZER MEDICATION (SUCH AS ALBUTEROL): ONCE A WEEK OR LESS

## 2024-07-21 ASSESSMENT — SOCIAL DETERMINANTS OF HEALTH (SDOH): HOW OFTEN DO YOU GET TOGETHER WITH FRIENDS OR RELATIVES?: ONCE A WEEK

## 2024-07-24 ENCOUNTER — TELEPHONE (OUTPATIENT)
Dept: FAMILY MEDICINE | Facility: CLINIC | Age: 54
End: 2024-07-24
Payer: COMMERCIAL

## 2024-07-24 NOTE — TELEPHONE ENCOUNTER
Appointment Reminder>Left patient message regarding to in clinic appointment tomorrow at 3:10pm. with Dr. Magana at our [facility]. Any questions, please call us at [phone].     [facility]

## 2024-07-25 ENCOUNTER — OFFICE VISIT (OUTPATIENT)
Dept: FAMILY MEDICINE | Facility: CLINIC | Age: 54
End: 2024-07-25
Payer: COMMERCIAL

## 2024-07-25 ENCOUNTER — ORDERS ONLY (AUTO-RELEASED) (OUTPATIENT)
Dept: FAMILY MEDICINE | Facility: CLINIC | Age: 54
End: 2024-07-25

## 2024-07-25 VITALS
SYSTOLIC BLOOD PRESSURE: 127 MMHG | BODY MASS INDEX: 25.18 KG/M2 | HEIGHT: 69 IN | WEIGHT: 170 LBS | TEMPERATURE: 98.1 F | DIASTOLIC BLOOD PRESSURE: 81 MMHG | HEART RATE: 82 BPM | RESPIRATION RATE: 18 BRPM | OXYGEN SATURATION: 99 %

## 2024-07-25 DIAGNOSIS — R00.2 PALPITATIONS: ICD-10-CM

## 2024-07-25 DIAGNOSIS — Z12.11 SCREEN FOR COLON CANCER: ICD-10-CM

## 2024-07-25 DIAGNOSIS — Z12.4 CERVICAL CANCER SCREENING: ICD-10-CM

## 2024-07-25 DIAGNOSIS — Z00.00 ROUTINE GENERAL MEDICAL EXAMINATION AT A HEALTH CARE FACILITY: Primary | ICD-10-CM

## 2024-07-25 DIAGNOSIS — E03.9 ACQUIRED HYPOTHYROIDISM: ICD-10-CM

## 2024-07-25 DIAGNOSIS — R06.83 SNORING: ICD-10-CM

## 2024-07-25 DIAGNOSIS — H52.4 PRESBYOPIA: ICD-10-CM

## 2024-07-25 DIAGNOSIS — Z12.31 VISIT FOR SCREENING MAMMOGRAM: ICD-10-CM

## 2024-07-25 DIAGNOSIS — F32.0 MILD MAJOR DEPRESSION (H): ICD-10-CM

## 2024-07-25 DIAGNOSIS — F41.1 ANXIETY STATE: ICD-10-CM

## 2024-07-25 DIAGNOSIS — R53.83 OTHER FATIGUE: ICD-10-CM

## 2024-07-25 DIAGNOSIS — Z13.220 LIPID SCREENING: ICD-10-CM

## 2024-07-25 LAB
ERYTHROCYTE [DISTWIDTH] IN BLOOD BY AUTOMATED COUNT: 13.6 % (ref 10–15)
HBA1C MFR BLD: 5.5 % (ref 0–5.6)
HCT VFR BLD AUTO: 40 % (ref 35–47)
HGB BLD-MCNC: 12.8 G/DL (ref 11.7–15.7)
MCH RBC QN AUTO: 27.4 PG (ref 26.5–33)
MCHC RBC AUTO-ENTMCNC: 32 G/DL (ref 31.5–36.5)
MCV RBC AUTO: 86 FL (ref 78–100)
PLATELET # BLD AUTO: 289 10E3/UL (ref 150–450)
RBC # BLD AUTO: 4.67 10E6/UL (ref 3.8–5.2)
WBC # BLD AUTO: 7.1 10E3/UL (ref 4–11)

## 2024-07-25 PROCEDURE — 87624 HPV HI-RISK TYP POOLED RSLT: CPT | Performed by: FAMILY MEDICINE

## 2024-07-25 PROCEDURE — 99214 OFFICE O/P EST MOD 30 MIN: CPT | Mod: 25 | Performed by: FAMILY MEDICINE

## 2024-07-25 PROCEDURE — 36415 COLL VENOUS BLD VENIPUNCTURE: CPT | Performed by: FAMILY MEDICINE

## 2024-07-25 PROCEDURE — 99396 PREV VISIT EST AGE 40-64: CPT | Mod: 25 | Performed by: FAMILY MEDICINE

## 2024-07-25 PROCEDURE — 90471 IMMUNIZATION ADMIN: CPT | Performed by: FAMILY MEDICINE

## 2024-07-25 PROCEDURE — G0145 SCR C/V CYTO,THINLAYER,RESCR: HCPCS | Performed by: FAMILY MEDICINE

## 2024-07-25 PROCEDURE — 82728 ASSAY OF FERRITIN: CPT | Performed by: FAMILY MEDICINE

## 2024-07-25 PROCEDURE — 82746 ASSAY OF FOLIC ACID SERUM: CPT | Performed by: FAMILY MEDICINE

## 2024-07-25 PROCEDURE — 83036 HEMOGLOBIN GLYCOSYLATED A1C: CPT | Performed by: FAMILY MEDICINE

## 2024-07-25 PROCEDURE — 90715 TDAP VACCINE 7 YRS/> IM: CPT | Performed by: FAMILY MEDICINE

## 2024-07-25 PROCEDURE — 85027 COMPLETE CBC AUTOMATED: CPT | Performed by: FAMILY MEDICINE

## 2024-07-25 PROCEDURE — 84443 ASSAY THYROID STIM HORMONE: CPT | Performed by: FAMILY MEDICINE

## 2024-07-25 PROCEDURE — 80053 COMPREHEN METABOLIC PANEL: CPT | Performed by: FAMILY MEDICINE

## 2024-07-25 PROCEDURE — 80061 LIPID PANEL: CPT | Performed by: FAMILY MEDICINE

## 2024-07-25 PROCEDURE — 90472 IMMUNIZATION ADMIN EACH ADD: CPT | Performed by: FAMILY MEDICINE

## 2024-07-25 PROCEDURE — 83550 IRON BINDING TEST: CPT | Performed by: FAMILY MEDICINE

## 2024-07-25 PROCEDURE — 90750 HZV VACC RECOMBINANT IM: CPT | Performed by: FAMILY MEDICINE

## 2024-07-25 PROCEDURE — 83540 ASSAY OF IRON: CPT | Performed by: FAMILY MEDICINE

## 2024-07-25 PROCEDURE — 82306 VITAMIN D 25 HYDROXY: CPT | Performed by: FAMILY MEDICINE

## 2024-07-25 PROCEDURE — 82607 VITAMIN B-12: CPT | Performed by: FAMILY MEDICINE

## 2024-07-25 RX ORDER — LEVOTHYROXINE SODIUM 50 UG/1
50 TABLET ORAL DAILY
Qty: 90 TABLET | Refills: 1 | Status: SHIPPED | OUTPATIENT
Start: 2024-07-25

## 2024-07-25 RX ORDER — PAROXETINE 20 MG/1
20 TABLET, FILM COATED ORAL EVERY MORNING
Qty: 90 TABLET | Refills: 0 | Status: SHIPPED | OUTPATIENT
Start: 2024-07-25

## 2024-07-25 RX ORDER — DILTIAZEM HYDROCHLORIDE 120 MG/1
120 CAPSULE, COATED, EXTENDED RELEASE ORAL DAILY
Qty: 90 CAPSULE | Refills: 3 | Status: SHIPPED | OUTPATIENT
Start: 2024-07-25

## 2024-07-25 ASSESSMENT — PATIENT HEALTH QUESTIONNAIRE - PHQ9
SUM OF ALL RESPONSES TO PHQ QUESTIONS 1-9: 4
SUM OF ALL RESPONSES TO PHQ QUESTIONS 1-9: 4
10. IF YOU CHECKED OFF ANY PROBLEMS, HOW DIFFICULT HAVE THESE PROBLEMS MADE IT FOR YOU TO DO YOUR WORK, TAKE CARE OF THINGS AT HOME, OR GET ALONG WITH OTHER PEOPLE: NOT DIFFICULT AT ALL

## 2024-07-25 NOTE — NURSING NOTE
Prior to immunization administration, verified patients identity using patient s name and date of birth. Please see Immunization Activity for additional information.     Screening Questionnaire for Adult Immunization    Are you sick today?   No   Do you have allergies to medications, food, a vaccine component or latex?   Yes   Have you ever had a serious reaction after receiving a vaccination?   No   Do you have a long-term health problem with heart, lung, kidney, or metabolic disease (e.g., diabetes), asthma, a blood disorder, no spleen, complement component deficiency, a cochlear implant, or a spinal fluid leak?  Are you on long-term aspirin therapy?   Yes   Do you have cancer, leukemia, HIV/AIDS, or any other immune system problem?   No   Do you have a parent, brother, or sister with an immune system problem?   No   In the past 3 months, have you taken medications that affect  your immune system, such as prednisone, other steroids, or anticancer drugs; drugs for the treatment of rheumatoid arthritis, Crohn s disease, or psoriasis; or have you had radiation treatments?   No   Have you had a seizure, or a brain or other nervous system problem?   No   During the past year, have you received a transfusion of blood or blood    products, or been given immune (gamma) globulin or antiviral drug?   No   For women: Are you pregnant or is there a chance you could become       pregnant during the next month?   No   Have you received any vaccinations in the past 4 weeks?   No     Immunization questionnaire was positive for at least one answer.  Notified Dr. Duarte.      Patient instructed to remain in clinic for 15 minutes afterwards, and to report any adverse reactions.     Screening performed by Vu Kwong MA on 7/25/2024 at 4:49 PM.

## 2024-07-25 NOTE — PROGRESS NOTES
Preventive Care Visit  [facility]  Alejandro Magana MD, Family Medicine  Jul 25, 2024      Assessment & Plan     Routine general medical examination at a health care facility  Last year was seen by dermatology - no concerns  Up to date with hepatitis B   Pneumonia vaccine not indicated, no history of asthma     Mild major depression (H24)  - PARoxetine (PAXIL) 20 MG tablet; Take 1 tablet (20 mg) by mouth every morning    Screen for colon cancer  - COLOGUARD(EXACT SCIENCES); Future    Cervical cancer screening  - Pap Screen with HPV - Recommended Age 30 - 65 Years  - Gynecologic Cytology (PAP)    Visit for screening mammogram  - MA Screening Bilateral w/ Tank; Future    Palpitations  - diltiazem ER COATED BEADS (CARDIZEM CD/CARTIA XT) 120 MG 24 hr capsule; Take 1 capsule (120 mg) by mouth daily    Anxiety state  - PARoxetine (PAXIL) 20 MG tablet; Take 1 tablet (20 mg) by mouth every morning    Acquired hypothyroidism  - levothyroxine (SYNTHROID/LEVOTHROID) 50 MCG tablet; Take 1 tablet (50 mcg) by mouth daily  - Adult Eye  Referral; Future  - TSH with free T4 reflex; Future  - TSH with free T4 reflex    Presbyopia  - Pilocarpine HCl 1.25 % SOLN; Apply 1 drop to eye every morning    Snoring  - Adult Sleep Eval & Management  Referral; Future    Other fatigue  - ordered below for further evaluation; tx as indicated   - Comprehensive metabolic panel (BMP + Alb, Alk Phos, ALT, AST, Total. Bili, TP); Future  - CBC with platelets; Future  - Vitamin D Deficiency; Future  - Vitamin B12; Future  - Folate; Future  - Iron and iron binding capacity; Future  - Ferritin; Future  - Hemoglobin A1c; Future  - Comprehensive metabolic panel (BMP + Alb, Alk Phos, ALT, AST, Total. Bili, TP)  - CBC with platelets  - Vitamin D Deficiency  - Vitamin B12  - Folate  - Iron and iron binding capacity  - Ferritin  - Hemoglobin A1c    Lipid screening  - Lipid panel reflex to direct LDL Fasting;  "Future  - Lipid panel reflex to direct LDL Fasting            BMI  Estimated body mass index is 25.12 kg/m  as calculated from the following:    Height as of this encounter: 1.752 m (5' 8.98\").    Weight as of this encounter: 77.1 kg (170 lb).       Counseling  Appropriate preventive services were addressed with this patient via screening, questionnaire, or discussion as appropriate for fall prevention, nutrition, physical activity, Tobacco-use cessation, weight loss and cognition.  Checklist reviewing preventive services available has been given to the patient.            Carole Hart is a 54 year old, presenting for the following:  Physical        7/25/2024     3:19 PM   Additional Questions   Roomed by shira   Accompanied by self        Health Care Directive  Patient does not have a Health Care Directive or Living Will: Discussed advance care planning with patient; information given to patient to review.    HPI    3 rounds of COVID - 2020, 2021, 2023    Over the last year her hair has been thin. She has new hair growth. She is unsure if it is due to hormones.   She is also on mounjaro through compounding pharmacy for 1.5 years.   She needs refill for dilatizam, has been out for one month. Reviewed cardiology note from 8/31/22 switched to dilitaizem 120 mg daily and advised to follow up with cardiology PRN. Per cardiology notes needs to be filled by PCP.  She is exhausted, go to work and then in bed. Sometimes she forgets to take medication.   She has been vegan since she was 17. She takes vitamin d 2, 000 U, vitamin b12, gummy hair supplements.  She takes her medication 4-5 days/week.   She feels that exhaustion since COVID.   She does snore at night. She is unsure of apnea.         7/21/2024   General Health   How would you rate your overall physical health? (!) FAIR   Feel stress (tense, anxious, or unable to sleep) Only a little      (!) STRESS CONCERN      7/21/2024   Nutrition   Three or more servings of " calcium each day? (!) NO   Diet: Vegetarian/vegan   How many servings of fruit and vegetables per day? (!) 0-1   How many sweetened beverages each day? (!) 2            7/21/2024   Exercise   Days per week of moderate/strenous exercise 0 days      (!) EXERCISE CONCERN      7/21/2024   Social Factors   Frequency of gathering with friends or relatives Once a week   Worry food won't last until get money to buy more No   Food not last or not have enough money for food? No   Do you have housing? (Housing is defined as stable permanent housing and does not include staying ouside in a car, in a tent, in an abandoned building, in an overnight shelter, or couch-surfing.) Yes   Are you worried about losing your housing? No   Lack of transportation? No   Unable to get utilities (heat,electricity)? No            7/21/2024   Fall Risk   Fallen 2 or more times in the past year? No   Trouble with walking or balance? No             7/21/2024   Dental   Dentist two times every year? (!) NO            7/21/2024   TB Screening   Were you born outside of the [place]? No          Today's PHQ-9 Score:       7/25/2024    12:13 PM   PHQ-9 SCORE   PHQ-9 Total Score MyChart 4 (Minimal depression)   PHQ-9 Total Score 4         7/21/2024   Substance Use   Alcohol more than 3/day or more than 7/wk No   Do you use any other substances recreationally? No        Social History     Tobacco Use    Smoking status: Never    Smokeless tobacco: Never   Vaping Use    Vaping status: Never Used   Substance Use Topics    Alcohol use: Not Currently     Comment: 1-2 beers 1-2Xs/mo    Drug use: No                  7/21/2024   STI Screening   New sexual partner(s) since last STI/HIV test? No        History of abnormal Pap smear: YES - reflected in Problem List and Health Maintenance accordingly    [facility]. [facility] LEX obtained.         4/22/2013    12:00 AM 4/7/2010    12:00 AM  "  PAP / HPV   PAP (Historical) NIL  NIL      ASCVD Risk   The 10-year ASCVD risk score (Darin MARTIN, et al., 2019) is: 1.3%    Values used to calculate the score:      Age: 54 years      Sex: Female      Is Non- : No      Diabetic: No      Tobacco smoker: No      Systolic Blood Pressure: 105 mmHg      Is BP treated: No      HDL Cholesterol: 54 mg/dL      Total Cholesterol: 217 mg/dL           Reviewed and updated as needed this visit by Provider                             Objective    Exam  LMP 07/09/2024    Estimated body mass index is 24.07 kg/m  as calculated from the following:    Height as of 10/1/23: 1.753 m (5' 9\").    Weight as of 10/1/23: 73.9 kg (163 lb).    Physical Exam          Signed Electronically by: Alejandro Magana MD    Answers submitted by the patient for this visit:  Patient Health Questionnaire (Submitted on 7/25/2024)  If you checked off any problems, how difficult have these problems made it for you to do your work, take care of things at home, or get along with other people?: Not difficult at all  PHQ9 TOTAL SCORE: 4    "

## 2024-07-26 LAB
ALBUMIN SERPL BCG-MCNC: 4.2 G/DL (ref 3.5–5.2)
ALP SERPL-CCNC: 71 U/L (ref 40–150)
ALT SERPL W P-5'-P-CCNC: 13 U/L (ref 0–50)
ANION GAP SERPL CALCULATED.3IONS-SCNC: 10 MMOL/L (ref 7–15)
AST SERPL W P-5'-P-CCNC: 20 U/L (ref 0–45)
BILIRUB SERPL-MCNC: 0.4 MG/DL
BUN SERPL-MCNC: 10.2 MG/DL (ref 6–20)
CALCIUM SERPL-MCNC: 9 MG/DL (ref 8.8–10.4)
CHLORIDE SERPL-SCNC: 102 MMOL/L (ref 98–107)
CHOLEST SERPL-MCNC: 208 MG/DL
CREAT SERPL-MCNC: 0.99 MG/DL (ref 0.51–0.95)
EGFRCR SERPLBLD CKD-EPI 2021: 68 ML/MIN/1.73M2
FASTING STATUS PATIENT QL REPORTED: NO
FASTING STATUS PATIENT QL REPORTED: NO
FERRITIN SERPL-MCNC: 15 NG/ML (ref 11–328)
FOLATE SERPL-MCNC: 9.8 NG/ML (ref 4.6–34.8)
GLUCOSE SERPL-MCNC: 82 MG/DL (ref 70–99)
HCO3 SERPL-SCNC: 26 MMOL/L (ref 22–29)
HDLC SERPL-MCNC: 52 MG/DL
IRON BINDING CAPACITY (ROCHE): 377 UG/DL (ref 240–430)
IRON SATN MFR SERPL: 12 % (ref 15–46)
IRON SERPL-MCNC: 47 UG/DL (ref 37–145)
LDLC SERPL CALC-MCNC: 118 MG/DL
NONHDLC SERPL-MCNC: 156 MG/DL
POTASSIUM SERPL-SCNC: 4.4 MMOL/L (ref 3.4–5.3)
PROT SERPL-MCNC: 6.9 G/DL (ref 6.4–8.3)
SODIUM SERPL-SCNC: 138 MMOL/L (ref 135–145)
TRIGL SERPL-MCNC: 190 MG/DL
TSH SERPL DL<=0.005 MIU/L-ACNC: 3.41 UIU/ML (ref 0.3–4.2)
VIT B12 SERPL-MCNC: 1203 PG/ML (ref 232–1245)
VIT D+METAB SERPL-MCNC: 33 NG/ML (ref 20–50)

## 2024-07-31 LAB
BKR LAB AP GYN ADEQUACY: NORMAL
BKR LAB AP GYN INTERPRETATION: NORMAL
BKR LAB AP LMP: NORMAL
BKR LAB AP PREVIOUS ABNORMAL: NORMAL
PATH REPORT.COMMENTS IMP SPEC: NORMAL
PATH REPORT.COMMENTS IMP SPEC: NORMAL
PATH REPORT.RELEVANT HX SPEC: NORMAL

## 2024-07-31 NOTE — PATIENT INSTRUCTIONS
Patient Education   Preventive Care Advice   This is general advice given by our system to help you stay healthy. However, your care team may have specific advice just for you. Please talk to your care team about your preventive care needs.  Nutrition  Eat 5 or more servings of fruits and vegetables each day.  Try wheat bread, brown rice and whole grain pasta (instead of white bread, rice, and pasta).  Get enough calcium and vitamin D. Check the label on foods and aim for 100% of the RDA (recommended daily allowance).  Lifestyle  Exercise at least 150 minutes each week  (30 minutes a day, 5 days a week).  Do muscle strengthening activities 2 days a week. These help control your weight and prevent disease.  No smoking.  Wear sunscreen to prevent skin cancer.  Have a dental exam and cleaning every 6 months.  Yearly exams  See your health care team every year to talk about:  Any changes in your health.  Any medicines your care team has prescribed.  Preventive care, family planning, and ways to prevent chronic diseases.  Shots (vaccines)   HPV shots (up to age 26), if you've never had them before.  Hepatitis B shots (up to age 59), if you've never had them before.  COVID-19 shot: Get this shot when it's due.  Flu shot: Get a flu shot every year.  Tetanus shot: Get a tetanus shot every 10 years.  Pneumococcal, hepatitis A, and RSV shots: Ask your care team if you need these based on your risk.  Shingles shot (for age 50 and up)  General health tests  Diabetes screening:  Starting at age 35, Get screened for diabetes at least every 3 years.  If you are younger than age 35, ask your care team if you should be screened for diabetes.  Cholesterol test: At age 39, start having a cholesterol test every 5 years, or more often if advised.  Bone density scan (DEXA): At age 50, ask your care team if you should have this scan for osteoporosis (brittle bones).  Hepatitis C: Get tested at least once in your life.  STIs (sexually  transmitted infections)  Before age 24: Ask your care team if you should be screened for STIs.  After age 24: Get screened for STIs if you're at risk. You are at risk for STIs (including HIV) if:  You are sexually active with more than one person.  You don't use condoms every time.  You or a partner was diagnosed with a sexually transmitted infection.  If you are at risk for HIV, ask about PrEP medicine to prevent HIV.  Get tested for HIV at least once in your life, whether you are at risk for HIV or not.  Cancer screening tests  Cervical cancer screening: If you have a cervix, begin getting regular cervical cancer screening tests starting at age 21.  Breast cancer scan (mammogram): If you've ever had breasts, begin having regular mammograms starting at age 40. This is a scan to check for breast cancer.  Colon cancer screening: It is important to start screening for colon cancer at age 45.  Have a colonoscopy test every 10 years (or more often if you're at risk) Or, ask your provider about stool tests like a FIT test every year or Cologuard test every 3 years.  To learn more about your testing options, visit:   .  For help making a decision, visit:   https://bit.ly/yg39139.  Prostate cancer screening test: If you have a prostate, ask your care team if a prostate cancer screening test (PSA) at age 55 is right for you.  Lung cancer screening: If you are a current or former smoker ages 50 to 80, ask your care team if ongoing lung cancer screenings are right for you.  For informational purposes only. Not to replace the advice of your health care provider. Copyright   2023 [employer]. All rights reserved. Clinically reviewed by the [facility] Transitions Program. [employer] [id number] - REV 01/24.

## 2024-08-02 LAB
HPV HR 12 DNA CVX QL NAA+PROBE: NEGATIVE
HPV16 DNA CVX QL NAA+PROBE: NEGATIVE
HPV18 DNA CVX QL NAA+PROBE: NEGATIVE
HUMAN PAPILLOMA VIRUS FINAL DIAGNOSIS: NORMAL

## 2024-08-29 DIAGNOSIS — H52.4 PRESBYOPIA: ICD-10-CM

## 2024-08-29 NOTE — TELEPHONE ENCOUNTER
Pending Prescriptions:                       Disp   Refills    Pilocarpine HCl 1.25 % SOLN               2.5 mL 0            Sig: Apply 1 drop to eye every morning.

## 2024-09-02 ENCOUNTER — MYC MEDICAL ADVICE (OUTPATIENT)
Dept: FAMILY MEDICINE | Facility: CLINIC | Age: 54
End: 2024-09-02
Payer: COMMERCIAL

## 2024-09-03 NOTE — TELEPHONE ENCOUNTER
Writer replied to patient via [email or web address].  JOSUE LemusN, RN (she/her)  [facility] RN

## 2024-09-05 DIAGNOSIS — H52.4 PRESBYOPIA: ICD-10-CM

## 2024-09-05 NOTE — TELEPHONE ENCOUNTER
Duplicate, was just filled on 8/30.  We have received 2 refill requests since it was refilled on 8/30. Can you clarify if they actually need a refill? Thanks.    Dr. Padgett

## 2024-09-05 NOTE — TELEPHONE ENCOUNTER
Called patient's mobile number: phone rang without being answered and no option to leave a voicemail.    Writer called patient's mobile number:   Left message to call back and ask to speak with an available triage nurse.    JOSUE TranN, RN-[facility]

## 2024-09-09 DIAGNOSIS — H52.4 PRESBYOPIA: ICD-10-CM

## 2024-09-09 NOTE — TELEPHONE ENCOUNTER
Refused. Needs appt with ophthalmology as directed when last refilled 8/30 by Dr. Magana.    Patti Walters, DNP

## 2024-09-17 LAB — NONINV COLON CA DNA+OCC BLD SCRN STL QL: POSITIVE

## 2024-09-18 ENCOUNTER — TELEPHONE (OUTPATIENT)
Dept: FAMILY MEDICINE | Facility: CLINIC | Age: 54
End: 2024-09-18
Payer: COMMERCIAL

## 2024-09-18 DIAGNOSIS — R19.5 POSITIVE COLORECTAL CANCER SCREENING USING COLOGUARD TEST: Primary | ICD-10-CM

## 2024-09-19 ENCOUNTER — MYC MEDICAL ADVICE (OUTPATIENT)
Dept: FAMILY MEDICINE | Facility: CLINIC | Age: 54
End: 2024-09-19
Payer: COMMERCIAL

## 2024-09-19 NOTE — TELEPHONE ENCOUNTER
Left message to call back and ask to speak with an available triage nurse.    MyChart message sent to patient.    CELY Tran, RN-BC  [facility]

## 2024-09-23 ENCOUNTER — TELEPHONE (OUTPATIENT)
Dept: GASTROENTEROLOGY | Facility: CLINIC | Age: 54
End: 2024-09-23
Payer: COMMERCIAL

## 2024-09-23 NOTE — TELEPHONE ENCOUNTER
See 9/19/24 MyChart encounter.  JOSUE TranN, RN-BC  [facility]

## 2024-09-23 NOTE — TELEPHONE ENCOUNTER
"Endoscopy Scheduling Screen    Have you had any respiratory illness or flu-like symptoms in the last 10 days?  No    What is your communication preference for Instructions and/or Bowel Prep?   MyChart    What insurance is in the chart?  Other:  pt uncble to confirm over phone     Ordering/Referring Provider:     JOY MADRID       (If ordering provider performs procedure, schedule with ordering provider unless otherwise instructed. )    BMI: Estimated body mass index is 25.12 kg/m  as calculated from the following:    Height as of 7/25/24: 1.752 m (5' 8.98\").    Weight as of 7/25/24: 77.1 kg (170 lb).     Sedation Ordered  moderate sedation.   If patient BMI > 50 do not schedule in [facility].    If patient BMI > 45 do not schedule at [facility].    Are you taking methadone or Suboxone?  NO, No RN review required.    Have you been diagnosed and are being treated for severe PTSD or severe anxiety?  NO, No RN review required.    Are you taking any prescription medications for pain 3 or more times per week?   NO, No RN review required.    Do you have a history of malignant hyperthermia?  No    (Females) Are you currently pregnant?   No     Have you been diagnosed or told you have pulmonary hypertension?   No    Do you have an LVAD?  No    Have you been told you have moderate to severe sleep apnea?  No.    Have you been told you have COPD, asthma, or any other lung disease?  No    Do you have any heart conditions?  No     Have you ever had or are you waiting for an organ transplant?  No. Continue scheduling, no site restrictions.    Have you had a stroke or transient ischemic attack (TIA aka \"mini stroke\" in the last 6 months?   No    Have you been diagnosed with or been told you have cirrhosis of the liver?   No.    Are you currently on dialysis?   No    Do you need assistance transferring?   No    BMI: Estimated body mass index is 25.12 kg/m  as calculated from the following:    Height as of 7/25/24: 1.752 m (5' 8.98\").    " Weight as of 7/25/24: 77.1 kg (170 lb).     Is patients BMI > 40 and scheduling location [facility]?  No    Do you take an injectable or oral medication for weight loss or diabetes (excluding insulin)?  No    Do you take the medication Naltrexone?  No    Do you take blood thinners?  No       Prep   Are you currently on dialysis or do you have chronic kidney disease?  No    Do you have a diagnosis of diabetes?  No    Do you have a diagnosis of cystic fibrosis (CF)?  No    On a regular basis do you go 3 -5 days between bowel movements?  No    BMI > 40?  No    Preferred Pharmacy:    [id number] - [place], [place] - [address] AT [facility] [address]  [address]  [place] [address]  Phone: [phone] Fax: [phone]      Final Scheduling Details     Procedure scheduled  Colonoscopy    Surgeon:        Date of procedure:  10/24/24     Pre-OP / PAC:   No - Not required for this site.    Location  [facility] - [facility] - Per order.    Sedation   Moderate Sedation - Per order.      Patient Reminders:   You will receive a call from a Nurse to review instructions and health history.  This assessment must be completed prior to your procedure.  Failure to complete the Nurse assessment may result in the procedure being cancelled.      On the day of your procedure, please designate an adult(s) who can drive you home stay with you for the next 24 hours. The medicines used in the exam will make you sleepy. You will not be able to drive.      You cannot take public transportation, ride share services, or non-medical taxi service without a responsible caregiver.  Medical transport services are allowed with the requirement that a responsible caregiver will receive you at your destination.  We require that drivers and caregivers are confirmed prior to your procedure.

## 2024-09-23 NOTE — TELEPHONE ENCOUNTER
Patient returned call and writer relayed message from 9/19 [employer] message.      JOSUE Marin CemN RN  [facility]

## 2024-10-14 ENCOUNTER — TELEPHONE (OUTPATIENT)
Dept: GASTROENTEROLOGY | Facility: CLINIC | Age: 54
End: 2024-10-14
Payer: COMMERCIAL

## 2024-10-24 ENCOUNTER — HOSPITAL ENCOUNTER (OUTPATIENT)
Facility: AMBULATORY SURGERY CENTER | Age: 54
Discharge: HOME | End: 2024-10-24
Attending: INTERNAL MEDICINE | Admitting: INTERNAL MEDICINE
Payer: COMMERCIAL

## 2024-10-24 VITALS
BODY MASS INDEX: 24.14 KG/M2 | RESPIRATION RATE: 16 BRPM | HEART RATE: 72 BPM | TEMPERATURE: 97.1 F | HEIGHT: 69 IN | WEIGHT: 163 LBS | OXYGEN SATURATION: 100 % | DIASTOLIC BLOOD PRESSURE: 72 MMHG | SYSTOLIC BLOOD PRESSURE: 122 MMHG

## 2024-10-24 LAB
COLONOSCOPY: NORMAL
HCG UR QL: NEGATIVE
INTERNAL QC OK POCT: NORMAL
POCT KIT EXPIRATION DATE: NORMAL
POCT KIT LOT NUMBER: NORMAL

## 2024-10-24 PROCEDURE — 81025 URINE PREGNANCY TEST: CPT | Performed by: PATHOLOGY

## 2024-10-24 PROCEDURE — 45378 DIAGNOSTIC COLONOSCOPY: CPT | Mod: 33 | Performed by: INTERNAL MEDICINE

## 2024-10-24 RX ORDER — SODIUM CHLORIDE, SODIUM LACTATE, POTASSIUM CHLORIDE, CALCIUM CHLORIDE 600; 310; 30; 20 MG/100ML; MG/100ML; MG/100ML; MG/100ML
INJECTION, SOLUTION INTRAVENOUS CONTINUOUS
Status: DISCONTINUED | OUTPATIENT
Start: 2024-10-24 | End: 2024-10-25 | Stop reason: HOSPADM

## 2024-10-24 RX ORDER — ONDANSETRON 2 MG/ML
4 INJECTION INTRAMUSCULAR; INTRAVENOUS EVERY 6 HOURS PRN
Status: DISCONTINUED | OUTPATIENT
Start: 2024-10-24 | End: 2024-10-25 | Stop reason: HOSPADM

## 2024-10-24 RX ORDER — NALOXONE HYDROCHLORIDE 0.4 MG/ML
0.2 INJECTION, SOLUTION INTRAMUSCULAR; INTRAVENOUS; SUBCUTANEOUS
Status: DISCONTINUED | OUTPATIENT
Start: 2024-10-24 | End: 2024-10-25 | Stop reason: HOSPADM

## 2024-10-24 RX ORDER — NALOXONE HYDROCHLORIDE 0.4 MG/ML
0.4 INJECTION, SOLUTION INTRAMUSCULAR; INTRAVENOUS; SUBCUTANEOUS
Status: DISCONTINUED | OUTPATIENT
Start: 2024-10-24 | End: 2024-10-25 | Stop reason: HOSPADM

## 2024-10-24 RX ORDER — ONDANSETRON 2 MG/ML
4 INJECTION INTRAMUSCULAR; INTRAVENOUS
Status: DISCONTINUED | OUTPATIENT
Start: 2024-10-24 | End: 2024-10-24 | Stop reason: HOSPADM

## 2024-10-24 RX ORDER — FENTANYL CITRATE 50 UG/ML
INJECTION, SOLUTION INTRAMUSCULAR; INTRAVENOUS PRN
Status: DISCONTINUED | OUTPATIENT
Start: 2024-10-24 | End: 2024-10-24 | Stop reason: HOSPADM

## 2024-10-24 RX ORDER — FLUMAZENIL 0.1 MG/ML
0.2 INJECTION, SOLUTION INTRAVENOUS
Status: ACTIVE | OUTPATIENT
Start: 2024-10-24 | End: 2024-10-24

## 2024-10-24 RX ORDER — LIDOCAINE 40 MG/G
CREAM TOPICAL
Status: DISCONTINUED | OUTPATIENT
Start: 2024-10-24 | End: 2024-10-24 | Stop reason: HOSPADM

## 2024-10-24 RX ORDER — ONDANSETRON 4 MG/1
4 TABLET, ORALLY DISINTEGRATING ORAL EVERY 6 HOURS PRN
Status: DISCONTINUED | OUTPATIENT
Start: 2024-10-24 | End: 2024-10-25 | Stop reason: HOSPADM

## 2024-10-24 RX ORDER — PROCHLORPERAZINE MALEATE 10 MG
10 TABLET ORAL EVERY 6 HOURS PRN
Status: DISCONTINUED | OUTPATIENT
Start: 2024-10-24 | End: 2024-10-25 | Stop reason: HOSPADM

## 2024-10-24 RX ADMIN — SODIUM CHLORIDE, SODIUM LACTATE, POTASSIUM CHLORIDE, CALCIUM CHLORIDE: 600; 310; 30; 20 INJECTION, SOLUTION INTRAVENOUS at 10:15

## 2024-10-24 NOTE — H&P
Hailey Stark  [id number]  female  54 year old      Reason for procedure/surgery: + cologuard    Patient Active Problem List   Diagnosis    Anxiety state    Mild major depression (H)    Myofascial pain    Congenital abnormality of pregnant uterus    CARDIOVASCULAR SCREENING; LDL GOAL LESS THAN 160    Cervicitis    Kidney stone    Mucopurulent vaginitis    Need for Tdap vaccination    Moderate dysplasia of cervix    Supraventricular tachycardia (H)       Past Surgical History:    Past Surgical History:   Procedure Laterality Date    CARDIAC SURGERY  2000    Catheter ablation    Cervical Conization Loop Electrode Excision  1998    KENNY II  F/U Pap q 3 mo x 2 yrs were all NORMAL    COSMETIC SURGERY  2005& 2006    Liposuction    EYE SURGERY  2006    Lasix    KIDNEY SURGERY  summer 2012    6 kidney stone excision/stent placed    LASIK      ORTHOPEDIC SURGERY  2012    Bunionectomy    SURGICAL HISTORY OF -       lasik    SURGICAL HISTORY OF -       catheter ablation heart atrial fib tx    VASCULAR SURGERY  Feb. 2020    Adhesive ablation       Past Medical History:   Past Medical History:   Diagnosis Date    Anxiety state, unspecified     Anxiety    Cardiac dysrhythmia, unspecified     Arrhythmia NOS s/p ablation    Chronic peptic ulcer, unspecified site, without mention of hemorrhage, perforation, or obstruction     Ulcer, Peptic    Depressive disorder, not elsewhere classified     Depression (non-psychotic)    Leukorrhea, not specified as infective 11/18/2009    heavy, daily, yellow/green mucoid dischg following retained tampon removal.Tx w flagyl, metrogel, Cleocin, Diflucan, doxycycline, boric acid capsules. 5/16/2011 + GBS.     Menarche age 13    cycles q 30 x 5 d, heavy    Moderate dysplasia of cervix (KENNY II) 1998    Normal Papssince LEEP->routine screening       Social History:   Social History     Tobacco Use    Smoking status: Never    Smokeless tobacco: Never   Substance Use Topics    Alcohol use: Not Currently      Comment: 1-2 beers 1-2Xs/mo       Family History:   Family History   Problem Relation Age of Onset    Thyroid Disease Mother         removed    Cancer Paternal Grandmother         stomach cancer    Alzheimer Disease Paternal Grandmother     Depression Sister     Thyroid Disease Sister     Thyroid Disease Sister         on meds    Diabetes Brother     Depression Brother     Diabetes Nephew     Glaucoma No family hx of     Macular Degeneration No family hx of        Allergies:   Allergies   Allergen Reactions    Meloxicam      Tongue swelling       Active Medications:   Current Outpatient Medications   Medication Sig Dispense Refill    buPROPion (WELLBUTRIN XL) 150 MG 24 hr tablet TAKE 1 TABLET BY MOUTH DAILY 150 tablet 0    diltiazem ER COATED BEADS (CARDIZEM CD/CARTIA XT) 120 MG 24 hr capsule Take 1 capsule (120 mg) by mouth daily 90 capsule 3    levothyroxine (SYNTHROID/LEVOTHROID) 50 MCG tablet Take 1 tablet (50 mcg) by mouth daily 90 tablet 1    PARoxetine (PAXIL) 20 MG tablet Take 1 tablet (20 mg) by mouth every morning 90 tablet 0    Pilocarpine HCl 1.25 % SOLN Apply 1 drop to eye every morning. 2.5 mL 0       Systemic Review:   CONSTITUTIONAL: NEGATIVE for fever, chills, change in weight  ENT/MOUTH: NEGATIVE for ear, mouth and throat problems  RESP: NEGATIVE for significant cough or SOB  CV: NEGATIVE for chest pain, palpitations or peripheral edema    Physical Examination:   Vital Signs: There were no vitals taken for this visit.  GENERAL: healthy, alert and no distress  NECK: no adenopathy, no asymmetry, masses, or scars  RESP: lungs clear to auscultation - no rales, rhonchi or wheezes  CV: regular rate and rhythm, normal S1 S2, no S3 or S4, no murmur, click or rub, no peripheral edema and peripheral pulses strong  ABDOMEN: soft, nontender, no hepatosplenomegaly, no masses and bowel sounds normal  MS: no gross musculoskeletal defects noted, no edema      Plan: Appropriate to proceed as  scheduled.      Daya Parish MD  10/24/2024    PCP:  Alejandro Magana Y

## 2024-10-24 NOTE — DISCHARGE INSTRUCTIONS
[facility]  Home Care Following Anesthesia  For 24 hours after surgery:  Get plenty of rest.  A responsible adult must stay with you for at least 24 hours after you leave the surgery center.  Do not drive or use heavy equipment.  If you have weakness or tingling, don't drive or use heavy equipment until this feeling goes away.   Do not drink alcohol.   Avoid strenuous or risky activities.  Ask for help when climbing stairs.  You may feel lightheaded.  IF so, sit for a few minutes before standing.  Have someone help you get up.   If you have nausea (feel sick to your stomach): Drink only clear liquids such as apple juice, ginger ale, broth or 7-Up.  Rest may also help.  Be sure to drink enough fluids.  Move to a regular diet as you feel able.   You may have a slight fever.  Call the doctor if your fever is over 100 F (37.7 C) (taken under the tongue) or lasts longer than 24 hours.  You may have a dry mouth, a sore throat, muscle aches or trouble sleeping. These should go away after 24 hours.  Do not make important or legal decisions.   It is recommended to avoid smoking.               Tips for taking pain medications  To get the best pain relief possible, remember these points:  Take pain medications as directed, before pain becomes severe.  Pain medication can upset your stomach: taking it with food may help.  Constipation is a common side effect of pain medication. Drink plenty of  fluids.  Eat foods high in fiber. Take a stool softener if recommended by your doctor or pharmacist.  Do not drink alcohol, drive or operate machinery while taking pain medications.  Ask about other ways to control pain, such as with heat, ice or relaxation.    Tylenol/Acetaminophen Consumption    If you feel your pain relief is insufficient, you may take Tylenol/Acetaminophen in addition to your narcotic pain medication.   Be careful not to exceed 4,000 mg of Tylenol/Acetaminophen in a 24 hour  period from all sources.  If you are taking extra strength Tylenol/acetaminophen (500 mg), the maximum dose is 8 tablets in 24 hours.  If you are taking regular strength acetaminophen (325 mg), the maximum dose is 12 tablets in 24 hours.    Call a doctor for any of the following:  Signs of infection (fever, growing tenderness at the surgery site, a large amount of drainage or bleeding, severe pain, foul-smelling drainage, redness, swelling).  It has been over 8 to 10 hours since surgery and you are still not able to urinate (pass water).  Headache for over 24 hours.  Numbness, tingling or weakness the day after surgery (if you had spinal anesthesia).  Signs of Covid-19 infection (temperature over 100 degrees, shortness of breath, cough, loss of taste/smell, generalized body aches, persistent headache, chills, sore throat, nausea/vomiting/diarrhea)  Your doctor is:       Dr. Parish               Or dial [phone] and ask for the resident on call for:  Gastroenterology  For emergency care, call the:  [facility]:  [phone] (TTY for hearing impaired: [phone])

## 2024-12-18 ENCOUNTER — PATIENT OUTREACH (OUTPATIENT)
Dept: CARE COORDINATION | Facility: CLINIC | Age: 54
End: 2024-12-18
Payer: COMMERCIAL

## 2025-02-19 ENCOUNTER — LAB (OUTPATIENT)
Dept: LAB | Facility: CLINIC | Age: 55
End: 2025-02-19
Payer: COMMERCIAL

## 2025-02-19 DIAGNOSIS — E66.3 OVER WEIGHT: ICD-10-CM

## 2025-02-19 DIAGNOSIS — E34.9 HORMONE IMBALANCE: ICD-10-CM

## 2025-02-19 DIAGNOSIS — E56.9 VITAMIN DEFICIENCY: ICD-10-CM

## 2025-02-19 DIAGNOSIS — E66.3 OVER WEIGHT: Primary | ICD-10-CM

## 2025-02-19 LAB
ALBUMIN SERPL BCG-MCNC: 4.1 G/DL (ref 3.5–5.2)
ALP SERPL-CCNC: 78 U/L (ref 40–150)
ALT SERPL W P-5'-P-CCNC: 16 U/L (ref 0–50)
ANION GAP SERPL CALCULATED.3IONS-SCNC: 13 MMOL/L (ref 7–15)
AST SERPL W P-5'-P-CCNC: 21 U/L (ref 0–45)
BASOPHILS # BLD AUTO: 0 10E3/UL (ref 0–0.2)
BASOPHILS NFR BLD AUTO: 1 %
BILIRUB SERPL-MCNC: 0.5 MG/DL
BUN SERPL-MCNC: 7.4 MG/DL (ref 6–20)
CALCIUM SERPL-MCNC: 9 MG/DL (ref 8.8–10.4)
CHLORIDE SERPL-SCNC: 103 MMOL/L (ref 98–107)
CHOLEST SERPL-MCNC: 196 MG/DL
CREAT SERPL-MCNC: 1.06 MG/DL (ref 0.51–0.95)
EGFRCR SERPLBLD CKD-EPI 2021: 63 ML/MIN/1.73M2
EOSINOPHIL # BLD AUTO: 0.1 10E3/UL (ref 0–0.7)
EOSINOPHIL NFR BLD AUTO: 2 %
ERYTHROCYTE [DISTWIDTH] IN BLOOD BY AUTOMATED COUNT: 13.8 % (ref 10–15)
EST. AVERAGE GLUCOSE BLD GHB EST-MCNC: 117 MG/DL
FASTING STATUS PATIENT QL REPORTED: YES
FASTING STATUS PATIENT QL REPORTED: YES
GLUCOSE SERPL-MCNC: 90 MG/DL (ref 70–99)
HBA1C MFR BLD: 5.7 % (ref 0–5.6)
HCO3 SERPL-SCNC: 22 MMOL/L (ref 22–29)
HCT VFR BLD AUTO: 40.7 % (ref 35–47)
HDLC SERPL-MCNC: 57 MG/DL
HGB BLD-MCNC: 12.9 G/DL (ref 11.7–15.7)
IMM GRANULOCYTES # BLD: 0 10E3/UL
IMM GRANULOCYTES NFR BLD: 0 %
LDLC SERPL CALC-MCNC: 123 MG/DL
LYMPHOCYTES # BLD AUTO: 1.5 10E3/UL (ref 0.8–5.3)
LYMPHOCYTES NFR BLD AUTO: 28 %
MCH RBC QN AUTO: 27.6 PG (ref 26.5–33)
MCHC RBC AUTO-ENTMCNC: 31.7 G/DL (ref 31.5–36.5)
MCV RBC AUTO: 87 FL (ref 78–100)
MONOCYTES # BLD AUTO: 0.5 10E3/UL (ref 0–1.3)
MONOCYTES NFR BLD AUTO: 9 %
NEUTROPHILS # BLD AUTO: 3.3 10E3/UL (ref 1.6–8.3)
NEUTROPHILS NFR BLD AUTO: 60 %
NONHDLC SERPL-MCNC: 139 MG/DL
PLATELET # BLD AUTO: 392 10E3/UL (ref 150–450)
POTASSIUM SERPL-SCNC: 4.2 MMOL/L (ref 3.4–5.3)
PROT SERPL-MCNC: 7.1 G/DL (ref 6.4–8.3)
RBC # BLD AUTO: 4.68 10E6/UL (ref 3.8–5.2)
SODIUM SERPL-SCNC: 138 MMOL/L (ref 135–145)
TRIGL SERPL-MCNC: 82 MG/DL
TSH SERPL DL<=0.005 MIU/L-ACNC: 2.15 UIU/ML (ref 0.3–4.2)
WBC # BLD AUTO: 5.4 10E3/UL (ref 4–11)

## 2025-02-19 PROCEDURE — 83036 HEMOGLOBIN GLYCOSYLATED A1C: CPT

## 2025-02-19 PROCEDURE — 80053 COMPREHEN METABOLIC PANEL: CPT

## 2025-02-19 PROCEDURE — 80061 LIPID PANEL: CPT

## 2025-02-19 PROCEDURE — 85025 COMPLETE CBC W/AUTO DIFF WBC: CPT

## 2025-02-19 PROCEDURE — 84443 ASSAY THYROID STIM HORMONE: CPT

## 2025-02-19 PROCEDURE — 36415 COLL VENOUS BLD VENIPUNCTURE: CPT

## 2025-04-19 ENCOUNTER — HEALTH MAINTENANCE LETTER (OUTPATIENT)
Age: 55
End: 2025-04-19

## 2025-05-19 DIAGNOSIS — F32.0 MILD MAJOR DEPRESSION: ICD-10-CM

## 2025-05-19 DIAGNOSIS — F41.1 ANXIETY STATE: ICD-10-CM

## 2025-05-19 NOTE — TELEPHONE ENCOUNTER
Clinic RN: Please contact patient because patient should have run out of this medication on February 2024. Confirm patient is taking this medication as prescribed. Document findings and route refill encounter to provider for approval or denial.

## 2025-05-19 NOTE — TELEPHONE ENCOUNTER
Clinic RN: Please contact patient because patient should have run out of this medication on JULY 2024. Confirm patient is taking this medication as prescribed. Document findings and route refill encounter to provider for approval or denial.

## 2025-05-21 NOTE — TELEPHONE ENCOUNTER
Prevently message sent to patient.  Sharifa JOHNSON BSN, PHN, RN-[facility]  [phone]

## 2025-05-22 RX ORDER — BUPROPION HYDROCHLORIDE 150 MG/1
150 TABLET ORAL DAILY
Qty: 90 TABLET | Refills: 0 | Status: SHIPPED | OUTPATIENT
Start: 2025-05-22

## 2025-05-22 NOTE — TELEPHONE ENCOUNTER
Pt response re: gap in medication:    I skip here and there but I had some old meds I was using     Sharifa JOHNSON BSN, PHN, RN-[facility]  [phone]

## 2025-07-14 ENCOUNTER — PATIENT OUTREACH (OUTPATIENT)
Dept: CARE COORDINATION | Facility: CLINIC | Age: 55
End: 2025-07-14
Payer: COMMERCIAL

## 2025-07-28 ENCOUNTER — PATIENT OUTREACH (OUTPATIENT)
Dept: CARE COORDINATION | Facility: CLINIC | Age: 55
End: 2025-07-28
Payer: COMMERCIAL

## 2025-09-03 DIAGNOSIS — F32.0 MILD MAJOR DEPRESSION: ICD-10-CM

## 2025-09-03 DIAGNOSIS — F41.1 ANXIETY STATE: ICD-10-CM

## 2025-09-04 RX ORDER — PAROXETINE 20 MG/1
20 TABLET, FILM COATED ORAL EVERY MORNING
Qty: 90 TABLET | Refills: 0 | Status: SHIPPED | OUTPATIENT
Start: 2025-09-04

## (undated) DEVICE — KIT ENDO TURNOVER/PROCEDURE CARRY-ON 101822

## (undated) DEVICE — SPECIMEN CONTAINER 3OZ W/FORMALIN 59901

## (undated) DEVICE — TUBING SUCTION MEDI-VAC 1/4"X20' N620A

## (undated) DEVICE — SUCTION MANIFOLD NEPTUNE 2 SYS 1 PORT 702-025-000

## (undated) DEVICE — KIT ENDO FIRST STEP DISINFECTANT 200ML W/POUCH EP-4

## (undated) DEVICE — SOL WATER IRRIG 500ML BOTTLE 2F7113

## (undated) DEVICE — GOWN IMPERVIOUS 2XL BLUE

## (undated) RX ORDER — LIDOCAINE HYDROCHLORIDE 20 MG/ML
SOLUTION OROPHARYNGEAL
Status: DISPENSED
Start: 2022-04-19

## (undated) RX ORDER — FENTANYL CITRATE 50 UG/ML
INJECTION, SOLUTION INTRAMUSCULAR; INTRAVENOUS
Status: DISPENSED
Start: 2024-10-24